# Patient Record
Sex: MALE | Race: WHITE | NOT HISPANIC OR LATINO | Employment: FULL TIME | URBAN - METROPOLITAN AREA
[De-identification: names, ages, dates, MRNs, and addresses within clinical notes are randomized per-mention and may not be internally consistent; named-entity substitution may affect disease eponyms.]

---

## 2017-01-10 ENCOUNTER — TELEPHONE (OUTPATIENT)
Dept: NEUROLOGY | Facility: HOSPITAL | Age: 32
End: 2017-01-10

## 2017-01-10 NOTE — TELEPHONE ENCOUNTER
Spoke with pt and informed him that EGD was approved but not the stretta procedure. Pt verbalizes understanding.

## 2017-01-10 NOTE — TELEPHONE ENCOUNTER
Spoke with pt and informed him that procedure that he is supposed to have tomorrow will not be taking place because his insurance will not cover the test. Informed pt that starting the process of appealing the decision and will contact pt to set up new date. Pt verbalizes understanding.

## 2017-05-02 ENCOUNTER — OFFICE VISIT (OUTPATIENT)
Dept: FAMILY MEDICINE | Facility: HOSPITAL | Age: 32
End: 2017-05-02
Attending: FAMILY MEDICINE
Payer: OTHER GOVERNMENT

## 2017-05-02 VITALS
DIASTOLIC BLOOD PRESSURE: 85 MMHG | BODY MASS INDEX: 32.5 KG/M2 | HEART RATE: 80 BPM | HEIGHT: 71 IN | WEIGHT: 232.13 LBS | SYSTOLIC BLOOD PRESSURE: 122 MMHG

## 2017-05-02 DIAGNOSIS — K21.9 CHRONIC GERD: ICD-10-CM

## 2017-05-02 DIAGNOSIS — L91.8 ACROCHORDON: Primary | ICD-10-CM

## 2017-05-02 PROCEDURE — 99214 OFFICE O/P EST MOD 30 MIN: CPT

## 2017-05-02 NOTE — PROGRESS NOTES
"Subjective:       Patient ID: Daniel Behlar is a 32 y.o. male.    Chief Complaint: referral    HPI Comments: 32 year old male history of GERD comes in for blurry vision.  Patient reports that he has been having a skin tag on his right eye for "years" that has caused some visual disturbance. Reports that patient has been busy and unable to care for it.  Reports that the tag would sometime cause his eye to itch.  Denies erythema, fluctuance, or drainage. Patient is concerned that it may grow and wants to have it removed.     Review of Systems   Constitutional: Negative for chills, fatigue, fever and unexpected weight change.   Eyes: Positive for visual disturbance. Negative for photophobia, pain, discharge, redness and itching.   Respiratory: Negative for cough, chest tightness and shortness of breath.    Cardiovascular: Negative for chest pain.   Gastrointestinal: Negative for abdominal pain, constipation, diarrhea, nausea and vomiting.   Endocrine: Negative for polyuria.   Neurological: Negative for headaches.       Objective:      Vitals:    05/02/17 1533   BP: 122/85   Pulse: 80     Physical Exam   Constitutional: He is oriented to person, place, and time. He appears well-developed and well-nourished. No distress.   HENT:   Head: Normocephalic and atraumatic.   Right Ear: External ear normal.   Left Ear: External ear normal.   3x3 mm acrochordon on lower lateral eye lid   Eyes: Conjunctivae and EOM are normal. Right eye exhibits no discharge. Left eye exhibits no discharge. No scleral icterus.   Neck: Normal range of motion.   Cardiovascular: Normal rate, regular rhythm and normal heart sounds.  Exam reveals no gallop and no friction rub.    No murmur heard.  Pulmonary/Chest: Effort normal and breath sounds normal. No respiratory distress. He has no wheezes. He has no rales. He exhibits no tenderness.   Musculoskeletal: He exhibits no edema or tenderness.   Neurological: He is alert and oriented to person, place, " and time.   Skin: Skin is warm. He is not diaphoretic.   Psychiatric: He has a normal mood and affect. His behavior is normal. Judgment and thought content normal.   Nursing note and vitals reviewed.      Assessment:       1. Acrochordon    2. Chronic GERD        Plan:       Acrochordon  -     Ambulatory referral to Dermatology  -     Ambulatory referral to Ophthalmology    Chronic GERD    - Patient on chronic PPI. Notified patient of chronic PPI use causing renal injury.  Patient aware.    Return if symptoms worsen or fail to improve.

## 2017-05-04 NOTE — PROGRESS NOTES
I assume primary medical responsibility for this patient, I have reviewed the case history, findings, diagnosis and treatment plan with the resident and agree that the care is reasonable and necessary. This service has been performed by a resident without the presence of a teaching physician under the primary care exception  Natalie Rowland  5/4/2017

## 2017-06-26 ENCOUNTER — OFFICE VISIT (OUTPATIENT)
Dept: FAMILY MEDICINE | Facility: HOSPITAL | Age: 32
End: 2017-06-26
Attending: FAMILY MEDICINE
Payer: OTHER GOVERNMENT

## 2017-06-26 VITALS
HEIGHT: 71 IN | HEART RATE: 63 BPM | DIASTOLIC BLOOD PRESSURE: 80 MMHG | WEIGHT: 231.25 LBS | BODY MASS INDEX: 32.37 KG/M2 | SYSTOLIC BLOOD PRESSURE: 119 MMHG

## 2017-06-26 DIAGNOSIS — N45.1 EPIDIDYMITIS: ICD-10-CM

## 2017-06-26 DIAGNOSIS — N50.811 RIGHT TESTICULAR PAIN: Primary | ICD-10-CM

## 2017-06-26 PROCEDURE — 99213 OFFICE O/P EST LOW 20 MIN: CPT

## 2017-06-26 RX ORDER — NAPROXEN 500 MG/1
500 TABLET ORAL 2 TIMES DAILY WITH MEALS
Qty: 28 TABLET | Refills: 0 | Status: SHIPPED | OUTPATIENT
Start: 2017-06-26 | End: 2017-07-10

## 2017-06-26 NOTE — PROGRESS NOTES
"Subjective:       Patient ID: Daniel Behlar is a 32 y.o. male.    Chief Complaint: Testicle Pain    HPI   33 y/o no significant hx comes in for right testicular pain. Reports that 1 1/2 month ago, patient suddenly developed right testicular pain when he was in bed. Described it as an aching, pressure-like, squeezing pain, "gnawing", 5/10, non-radiating pain. Pain comes and goes randomly, with variation with duration. Sometimes, it occurs for a few seconds, and other times it occurs for 20 minutes.  Pain does not worsen with lifting heavy objects.  Denies fever or chills. Denies dysuria, hematuria, foul smelling urine. Denies abnormal semen. States that he recently became more sexually active with his wife, after she delivered. Patient reports that he is monogamous with his wife and his wife is monogamous also.      Review of Systems   Constitutional: Negative for chills, fatigue, fever and unexpected weight change.   HENT: Negative for sore throat.    Eyes: Negative for visual disturbance.   Respiratory: Negative for cough, chest tightness and shortness of breath.    Cardiovascular: Negative for chest pain.   Gastrointestinal: Negative for abdominal pain, constipation, diarrhea, nausea and vomiting.   Endocrine: Negative for polyuria.   Genitourinary: Negative for dysuria and hematuria.   Neurological: Negative for headaches.       Objective:      Vitals:    06/26/17 1032   BP: 119/80   Pulse: 63     Physical Exam   Constitutional: He is oriented to person, place, and time. He appears well-developed and well-nourished. No distress.   HENT:   Head: Normocephalic and atraumatic.   Right Ear: External ear normal.   Left Ear: External ear normal.   Eyes: Conjunctivae and EOM are normal. Right eye exhibits no discharge. Left eye exhibits no discharge. No scleral icterus.   Neck: Normal range of motion.   Cardiovascular: Normal rate, regular rhythm and normal heart sounds.  Exam reveals no gallop and no friction rub.  "   No murmur heard.  Pulmonary/Chest: Effort normal and breath sounds normal. No respiratory distress. He has no wheezes. He has no rales. He exhibits no tenderness.   Genitourinary: Penis normal. No penile tenderness.   Genitourinary Comments: Right lateral scrotal pain, sharp, non-radiating. No hernia noted. No abnormal mass felt on baring down or coughing. Testes non-tender, equal in size, equal firmness.   Musculoskeletal: He exhibits no edema or tenderness.   Neurological: He is alert and oriented to person, place, and time.   Skin: Skin is warm. He is not diaphoretic.   Psychiatric: He has a normal mood and affect. His behavior is normal. Judgment and thought content normal.   Nursing note and vitals reviewed.        POCT UA - benign     Assessment:       1. Right testicular pain    2. Epididymitis        Plan:       Right testicular pain  -     POCT URINALYSIS  -     Urinalysis; Future; Expected date: 06/26/2017  -     C. trachomatis/N. gonorrhoeae by AMP DNA Urine; Future; Expected date: 06/26/2017  -     RPR; Future; Expected date: 06/26/2017  -     US Scrotum And Testicles; Future; Expected date: 06/26/2017  -     Urine culture; Future; Expected date: 06/26/2017  -     naproxen (EC NAPROSYN) 500 MG EC tablet; Take 1 tablet (500 mg total) by mouth 2 (two) times daily with meals.  Dispense: 28 tablet; Refill: 0    Epididymitis  -     Urinalysis; Future; Expected date: 06/26/2017  -     C. trachomatis/N. gonorrhoeae by AMP DNA Urine; Future; Expected date: 06/26/2017  -     RPR; Future; Expected date: 06/26/2017  -     US Scrotum And Testicles; Future; Expected date: 06/26/2017  -     Urine culture; Future; Expected date: 06/26/2017  -     naproxen (EC NAPROSYN) 500 MG EC tablet; Take 1 tablet (500 mg total) by mouth 2 (two) times daily with meals.  Dispense: 28 tablet; Refill: 0    - Suspected non-infectious epididymitis. Will attempt conservative therapy.  If STI labs positive, will start Abx.    Return in  about 2 weeks (around 7/10/2017).

## 2017-06-28 ENCOUNTER — TELEPHONE (OUTPATIENT)
Dept: FAMILY MEDICINE | Facility: HOSPITAL | Age: 32
End: 2017-06-28

## 2017-11-13 ENCOUNTER — HOSPITAL ENCOUNTER (EMERGENCY)
Facility: HOSPITAL | Age: 32
Discharge: HOME | End: 2017-11-13
Attending: EMERGENCY MEDICINE
Payer: MEDICAID

## 2017-11-13 VITALS
HEIGHT: 71 IN | SYSTOLIC BLOOD PRESSURE: 132 MMHG | DIASTOLIC BLOOD PRESSURE: 78 MMHG | RESPIRATION RATE: 16 BRPM | WEIGHT: 200 LBS | TEMPERATURE: 98 F | OXYGEN SATURATION: 97 % | BODY MASS INDEX: 28 KG/M2 | HEART RATE: 74 BPM
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
## 2017-11-13 DIAGNOSIS — S62.343A NONDISPLACED FRACTURE OF BASE OF THIRD METACARPAL BONE, LEFT HAND, INITIAL ENCOUNTER FOR CLOSED FRACTURE: Primary | ICD-10-CM

## 2017-11-13 DIAGNOSIS — S62.345A NONDISPLACED FRACTURE OF BASE OF FOURTH METACARPAL BONE, LEFT HAND, INITIAL ENCOUNTER FOR CLOSED FRACTURE: ICD-10-CM

## 2017-11-13 PROCEDURE — 99283 EMERGENCY DEPT VISIT LOW MDM: CPT | Mod: 25

## 2017-11-13 PROCEDURE — 29125 APPL SHORT ARM SPLINT STATIC: CPT | Mod: LT

## 2017-11-13 PROCEDURE — 25000003 PHARM REV CODE 250: Performed by: NURSE PRACTITIONER

## 2017-11-13 RX ORDER — ACETAMINOPHEN 500 MG
500 TABLET ORAL
Status: COMPLETED | OUTPATIENT
Start: 2017-11-13 | End: 2017-11-13

## 2017-11-13 RX ORDER — HYDROCODONE BITARTRATE AND ACETAMINOPHEN 5; 325 MG/1; MG/1
1 TABLET ORAL EVERY 6 HOURS PRN
Qty: 10 TABLET | Refills: 0 | Status: SHIPPED | OUTPATIENT
Start: 2017-11-13 | End: 2017-11-21

## 2017-11-13 RX ADMIN — ACETAMINOPHEN 500 MG: 500 TABLET ORAL at 03:11

## 2017-11-13 NOTE — ED NOTES
Left hand pain and swelling after falling and landing on it while moving a bed frame on Saturday.  Pt says that he fell backwards and landed on hand in an awkward position.  Pt tried applying ice and naproxyn to hand, says that it has improved some but still concerned.  +swelling, + bruising to left hand, left 2nd, 3rd, 4th and 5th digits, and palm of left hand.  Cap refill < 2 sec, nailbeds pink.  Radial/ulnar pulses palpable and strong.  Ice pack applied

## 2017-11-13 NOTE — ED PROVIDER NOTES
"Encounter Date: 11/13/2017       History     Chief Complaint   Patient presents with    Hand Injury     pt reports injurying left hand on Saturday while moving a bedframe. swelling noted.      31yo male here for left hand pain after a fall on Saturday.  Pt states that he was moving a bed frame up stairs on Saturday when he fell backwards, landing on outstretched left hand.  He denies head injury and neck pain.  He reports that he heard "three pops" at the time of the injury and had immediate swelling.  He does report a history of a boxer's fracture in the same hand which he reports did not heal correctly as he removed the splint prior to being cleared.  He denies numbness and tingling.  Movement and use makes the pain worse.  Naprosyn helps moderately.        The history is provided by the patient.   Hand Injury    Illness onset: Saturday. The incident occurred at home. The injury mechanism was a fall. The pain is present in the left hand. The quality of the pain is described as aching. The pain is at a severity of 8/10. The pain has been constant since the incident. Pertinent negatives include no fever. He reports no foreign bodies present. The symptoms are aggravated by movement, palpation and use. He has tried NSAIDs and ice for the symptoms. The treatment provided moderate relief.     Review of patient's allergies indicates:  No Known Allergies  History reviewed. No pertinent past medical history.  Past Surgical History:   Procedure Laterality Date    ESOPHAGOGASTRODUODENOSCOPY       History reviewed. No pertinent family history.  Social History   Substance Use Topics    Smoking status: Current Every Day Smoker     Packs/day: 0.25     Types: Cigarettes    Smokeless tobacco: Never Used      Comment: quit smoking May 21, 2015, the day before first baby born    Alcohol use No      Comment: Weekly     Review of Systems   Constitutional: Negative for chills and fever.   HENT: Negative for congestion.  "   Respiratory: Negative for cough.    Gastrointestinal: Negative for abdominal pain and vomiting.   Musculoskeletal: Positive for arthralgias and joint swelling. Negative for gait problem and neck pain.   Skin: Negative for wound.   Neurological: Negative for weakness.   Psychiatric/Behavioral: Negative for confusion.       Physical Exam     Initial Vitals [11/13/17 1457]   BP Pulse Resp Temp SpO2   (!) 141/84 91 16 97.9 °F (36.6 °C) 99 %      MAP       103         Physical Exam    Nursing note and vitals reviewed.  Constitutional: Vital signs are normal. He appears well-developed and well-nourished. He is active and cooperative. He is easily aroused.  Non-toxic appearance. He does not have a sickly appearance. He does not appear ill. No distress.   HENT:   Head: Normocephalic and atraumatic.   Eyes: Conjunctivae are normal.   Neck: Normal range of motion.   Cardiovascular: Normal rate.   Pulses:       Radial pulses are 2+ on the right side, and 2+ on the left side.   Pulmonary/Chest: Effort normal.   Abdominal: Soft. Normal appearance and bowel sounds are normal. There is no tenderness.   Musculoskeletal:        Left elbow: Normal.        Left wrist: He exhibits tenderness. He exhibits normal range of motion, no bony tenderness, no swelling, no effusion, no crepitus, no deformity and no laceration.        Left forearm: Normal.        Left hand: He exhibits decreased range of motion (due to pain), tenderness, bony tenderness and swelling. He exhibits normal two-point discrimination, normal capillary refill, no deformity and no laceration. Normal sensation noted. Normal strength noted.        Hands:  No scaphoid tenderness.    Neurological: He is alert, oriented to person, place, and time and easily aroused. He has normal strength. No sensory deficit. GCS eye subscore is 4. GCS verbal subscore is 5. GCS motor subscore is 6.   Skin: Skin is warm, dry and intact. Capillary refill takes less than 2 seconds. Bruising and  ecchymosis noted. No abrasion and no rash noted. No erythema.   Psychiatric: He has a normal mood and affect. His speech is normal and behavior is normal. Judgment and thought content normal. Cognition and memory are normal.         ED Course   Splint Application  Date/Time: 11/13/2017 4:00 PM  Performed by: CARISSA GIPSON  Authorized by: SHARLENE CHOPRA   Consent Done: Yes  Consent: Verbal consent obtained. Written consent not obtained.  Risks and benefits: risks, benefits and alternatives were discussed  Consent given by: patient  Patient understanding: patient states understanding of the procedure being performed  Patient consent: the patient's understanding of the procedure matches consent given  Procedure consent: procedure consent matches procedure scheduled  Imaging studies: imaging studies available  Required items: required blood products, implants, devices, and special equipment available  Patient identity confirmed: verbally with patient  Location details: left hand  Splint type: volar short arm  Supplies used: cotton padding,  elastic bandage and Ortho-Glass  Post-procedure: The splinted body part was neurovascularly unchanged following the procedure.  Patient tolerance: Patient tolerated the procedure well with no immediate complications        Labs Reviewed - No data to display     Imaging Results          X-Ray Wrist Complete Left (Final result)  Result time 11/13/17 15:41:39    Final result by Bettina Reddy MD (11/13/17 15:41:39)                 Impression:        1. Acute fractures of the third and fourth metacarpals, as above.    2. Dorsal wrist artifact from overlapping osseous interface versus triquetral fracture. Correlate for point tenderness at this region.      Electronically signed by: BETTINA REDDY MD, MD  Date:     11/13/17  Time:    15:41              Narrative:    COMPARISON: None    FINDINGS: 3 views left wrist; 3 views left hand.        Bones are well mineralized.  Acute mildly displaced mild type fracture involving the proximal and mid diaphysis and also proximal metaphysis of the fourth metacarpal without significant angulation. Additional acute nondisplaced fracture involving the entire diaphysis of the third metacarpal without significant angulation.  There is overlying soft tissue swelling.    Small ossific body projected dorsal to the carpals without significant overlying soft tissue swelling which could represent osseous interface/bony projection versus triquetral fracture.    Chronic-appearing/healed fracture deformity of the mid and distal fifth metacarpal with slight apex angulation toward the dorsal and normal aspect.    No dislocation or destructive osseous process identified.  Mild degenerative change about the carpals and radiocarpal interval.   No subcutaneous emphysema or radiodense retained foreign body.                             X-Ray Hand 3 View Left (Final result)  Result time 11/13/17 15:41:39    Final result by Bettina Reddy MD (11/13/17 15:41:39)                 Impression:        1. Acute fractures of the third and fourth metacarpals, as above.    2. Dorsal wrist artifact from overlapping osseous interface versus triquetral fracture. Correlate for point tenderness at this region.      Electronically signed by: BETTINA REDDY MD, MD  Date:     11/13/17  Time:    15:41              Narrative:    COMPARISON: None    FINDINGS: 3 views left wrist; 3 views left hand.        Bones are well mineralized. Acute mildly displaced mild type fracture involving the proximal and mid diaphysis and also proximal metaphysis of the fourth metacarpal without significant angulation. Additional acute nondisplaced fracture involving the entire diaphysis of the third metacarpal without significant angulation.  There is overlying soft tissue swelling.    Small ossific body projected dorsal to the carpals without significant overlying soft tissue swelling which could represent  osseous interface/bony projection versus triquetral fracture.    Chronic-appearing/healed fracture deformity of the mid and distal fifth metacarpal with slight apex angulation toward the dorsal and normal aspect.    No dislocation or destructive osseous process identified.  Mild degenerative change about the carpals and radiocarpal interval.   No subcutaneous emphysema or radiodense retained foreign body.                                 Medical Decision Making:   Initial Assessment:   33yo male here for left hand pain after FOOSH on Saturday.  No head or neck injury.  No numbness/tingling.  TTP over left #2, 3, and 4 metacarpals with bruising and swelling.  Left wrist tenderness at carpal bones.  No scaphoid tenderness.  NVI intact distal to injury.    Differential Diagnosis:   Contusion, fracture, dislocation, sprain  Clinical Tests:   Radiological Study: Ordered and Reviewed  ED Management:  Xray left wrist and hand, PO tylenol  Xray read by radiologist and reviewed by me.  Fractures of third and fourth metacarpals with possible triquetral fracture.  Pt placed in splint, please see procedure note.  F/u with ortho within 3 days.  I reviewed splint care and narcotic medication precautions.  Pt verbalized understanding, compliance, and agreement with the treatment plan.  RX norco.               Attending Attestation:     Physician Attestation Statement for NP/PA:   I discussed this assessment and plan of this patient with the NP/PA, but I did not personally examine the patient. The face to face encounter was performed by the NP/PA.                  ED Course      Clinical Impression:   The primary encounter diagnosis was Nondisplaced fracture of base of third metacarpal bone, left hand, initial encounter for closed fracture. Diagnoses of Fall and Nondisplaced fracture of base of fourth metacarpal bone, left hand, initial encounter for closed fracture were also pertinent to this visit.                            Shonda Dinero, KIRK  11/13/17 160       Andrew Curtis MD  11/13/17 [id number]

## 2017-11-14 ENCOUNTER — TELEPHONE (OUTPATIENT)
Dept: ORTHOPEDICS | Facility: CLINIC | Age: 32
End: 2017-11-14

## 2017-11-14 NOTE — TELEPHONE ENCOUNTER
I spoke with the patient and informed him that we don't have any openings for his insurance until next year and he should not wait that long to be seen. He understood and stated he will find another doctor.

## 2017-11-21 ENCOUNTER — HOSPITAL ENCOUNTER (EMERGENCY)
Facility: HOSPITAL | Age: 32
Discharge: HOME | End: 2017-11-21
Attending: EMERGENCY MEDICINE
Payer: MEDICAID

## 2017-11-21 VITALS
TEMPERATURE: 99 F | HEIGHT: 71 IN | DIASTOLIC BLOOD PRESSURE: 84 MMHG | HEART RATE: 90 BPM | OXYGEN SATURATION: 98 % | SYSTOLIC BLOOD PRESSURE: 150 MMHG | WEIGHT: 200 LBS | BODY MASS INDEX: 28 KG/M2 | RESPIRATION RATE: 18 BRPM

## 2017-11-21 DIAGNOSIS — S62.355A CLOSED NONDISPLACED FRACTURE OF SHAFT OF FOURTH METACARPAL BONE OF LEFT HAND, INITIAL ENCOUNTER: ICD-10-CM

## 2017-11-21 DIAGNOSIS — S62.353D CLOSED NONDISPLACED FRACTURE OF SHAFT OF THIRD METACARPAL BONE OF LEFT HAND WITH ROUTINE HEALING, SUBSEQUENT ENCOUNTER: Primary | ICD-10-CM

## 2017-11-21 PROCEDURE — 99283 EMERGENCY DEPT VISIT LOW MDM: CPT

## 2017-11-21 RX ORDER — HYDROCODONE BITARTRATE AND ACETAMINOPHEN 5; 325 MG/1; MG/1
1 TABLET ORAL EVERY 6 HOURS PRN
Qty: 15 TABLET | Refills: 0 | Status: SHIPPED | OUTPATIENT
Start: 2017-11-21 | End: 2018-03-21

## 2017-11-21 NOTE — ED NOTES
APPEARANCE: Alert, oriented and in no acute distress.  CARDIAC: Normal rate and rhythm, no murmur heard.   PERIPHERAL VASCULAR: peripheral pulses present. Normal cap refill. No edema. Warm to touch.    RESPIRATORY:Normal rate and effort, breath sounds clear bilaterally throughout chest. Respirations are equal and unlabored no obvious signs of distress.  GASTRO: soft, bowel sounds normal, no tenderness, no abdominal distention.  MUSC: Left hand pain x 1.5 weeks after sustaining fracture while helping move furniture. +bruising, pain and swelling, unable to get ortho appt until Dec 4th  SKIN: Skin is warm and dry, normal skin turgor, mucous membranes moist.  NEURO: 5/5 strength major flexors/extensors bilaterally. Sensory intact to light touch bilaterally. Missy coma scale: eyes open spontaneously-4, oriented & converses-5, obeys commands-6. No neurological abnormalities.   MENTAL STATUS: awake, alert and aware of environment.

## 2017-11-21 NOTE — ED PROVIDER NOTES
Encounter Date: 11/21/2017    SCRIBE #1 NOTE: I, Taya Culp, am scribing for, and in the presence of,  MONY Kim I have scribed the entire note.       History     Chief Complaint   Patient presents with    Hand Pain     left hand fracture 1.5 weeks ago, has temp splint on, has appt with ortho on 12/4 but has been having significant swelling and pain with tingling     Time seen by provider: 4:33 PM    This is a 32 y.o. male who presents with complaint of left hand pain. He reports onset of symptoms was about 7 days ago. The patient states prior to onset of symptoms he fractured his hand. As per note the patient fractured the base of 3rd metacarpal of the left hand. The patient describes the pain as throbbing. He notes associated tingling but denies any weakness, open wounds, redness, fever or chills. The patient notes the pain worsens with movement and mildly improves with rest. He has history of previous fracture to the left hand.       The history is provided by the patient.     Review of patient's allergies indicates:  No Known Allergies  History reviewed. No pertinent past medical history.  Past Surgical History:   Procedure Laterality Date    ESOPHAGOGASTRODUODENOSCOPY       History reviewed. No pertinent family history.  Social History   Substance Use Topics    Smoking status: Current Every Day Smoker     Packs/day: 0.25     Types: Cigarettes    Smokeless tobacco: Never Used      Comment: quit smoking May 21, 2015, the day before first baby born    Alcohol use No      Comment: Weekly     Review of Systems   Constitutional: Negative for chills and fever.   HENT: Negative for congestion and sore throat.    Eyes: Negative for redness and visual disturbance.   Respiratory: Negative for cough and shortness of breath.    Cardiovascular: Negative for chest pain and palpitations.   Gastrointestinal: Negative for abdominal pain, diarrhea, nausea and vomiting.   Genitourinary: Negative for dysuria.    Musculoskeletal: Negative for back pain.        Left hand pain   Skin: Negative for rash.   Neurological: Negative for weakness and headaches.   Psychiatric/Behavioral: Negative for confusion.       Physical Exam     Initial Vitals [11/21/17 1620]   BP Pulse Resp Temp SpO2   (!) 150/84 90 18 98.6 °F (37 °C) 98 %      MAP       106         Physical Exam    Nursing note and vitals reviewed.  Constitutional: He appears well-developed and well-nourished. He is not diaphoretic. No distress.   HENT:   Head: Normocephalic and atraumatic.   Nose: Nose normal.   Mouth/Throat: Oropharynx is clear and moist.   Eyes: Conjunctivae and EOM are normal. Pupils are equal, round, and reactive to light.   Neck: Normal range of motion. Neck supple.   Cardiovascular: Intact distal pulses.   Pulmonary/Chest: No respiratory distress.   Musculoskeletal: Normal range of motion. He exhibits tenderness. He exhibits no edema.   Bruising and ecchymosis of left hand extending to 2nd, 3rd and 5th metacarpal. Tenderness to 3rd metacarpal. No deformity noted   Lymphadenopathy:     He has no cervical adenopathy.   Neurological: He is alert and oriented to person, place, and time. He has normal strength.   Skin: Skin is warm and dry. No rash noted.         ED Course   Procedures  Labs Reviewed - No data to display     Imaging Results          X-Ray Hand 3 view Left (Final result)  Result time 11/21/17 17:04:05    Final result by Hannah Diaz MD (11/21/17 17:04:05)                 Impression:      1.  Grossly stable subacute third and fourth metacarpal fractures, no convincing new fracture.  Please see above.      Electronically signed by: HANNAH DIAZ MD  Date:     11/21/17  Time:    17:04              Narrative:    Hand complete 3 view left    Clinical history: Left hand pain    Comparison: 11/13/2017    Findings:  3 views.    Subacute fractures again noted of the third and fourth metacarpals, with remote injury involving the fifth  metacarpal.  Overall angulation and displacement of the subacute fractures is grossly stable as compared to the previous exam.  There is continued edema in the region.  No convincing new fracture.                            X-Rays:   Independently Interpreted Readings:   Other Readings:  Left Hand X-ray Reading (5:09 PM): No change from previous    Medical Decision Making:   Independently Interpreted Test(s):   I have ordered and independently interpreted X-rays - see prior notes.  Clinical Tests:   Radiological Study: Ordered and Reviewed    Additional MDM:   X-Rays: I have independently interpreted X-Ray(s) - see notes.     I, Dr. Shekhar Clifford, personally performed the services described in this documentation.   All medical record entries made by the scribe were at my direction and in my presence.   I have reviewed the chart and agree that the record is accurate and complete.   Shekhar Clifford MD.  5:10 PM 11/25/2017          Attending Attestation:             Attending ED Notes:   Patient's splint was removed, x-rays performed which showed no significant change.  Splint was replaced by nurse, BEKAH after placement.    Overall impression is nondisplaced fracture to the third and fourth metacarpals of the left hand.   Follow-up with Ortho as referred or return for any concerns.          ED Course      Clinical Impression:     1. Closed nondisplaced fracture of shaft of third metacarpal bone of left hand with routine healing, subsequent encounter    2. Closed nondisplaced fracture of shaft of fourth metacarpal bone of left hand, initial encounter                                 Shekhar Clifford MD  11/25/17 [id number]

## 2018-03-21 ENCOUNTER — OFFICE VISIT (OUTPATIENT)
Dept: FAMILY MEDICINE | Facility: HOSPITAL | Age: 33
End: 2018-03-21
Attending: FAMILY MEDICINE
Payer: MEDICAID

## 2018-03-21 VITALS
HEIGHT: 71 IN | WEIGHT: 202.38 LBS | HEART RATE: 125 BPM | BODY MASS INDEX: 28.33 KG/M2 | SYSTOLIC BLOOD PRESSURE: 145 MMHG | DIASTOLIC BLOOD PRESSURE: 80 MMHG

## 2018-03-21 DIAGNOSIS — M54.31 SCIATICA OF RIGHT SIDE: Primary | ICD-10-CM

## 2018-03-21 DIAGNOSIS — Z00.00 HEALTHCARE MAINTENANCE: ICD-10-CM

## 2018-03-21 DIAGNOSIS — R03.0 ELEVATED BLOOD PRESSURE READING: ICD-10-CM

## 2018-03-21 PROCEDURE — 99214 OFFICE O/P EST MOD 30 MIN: CPT

## 2018-03-21 RX ORDER — MELOXICAM 7.5 MG/1
7.5 TABLET ORAL DAILY
Qty: 30 TABLET | Refills: 0 | Status: SHIPPED | OUTPATIENT
Start: 2018-03-21 | End: 2018-04-20

## 2018-03-21 RX ORDER — GABAPENTIN 100 MG/1
100 CAPSULE ORAL 3 TIMES DAILY
Qty: 90 CAPSULE | Refills: 2 | Status: SHIPPED | OUTPATIENT
Start: 2018-03-21 | End: 2019-01-05

## 2018-03-21 NOTE — PROGRESS NOTES
Subjective:       Patient ID: Daniel Behlar is a 33 y.o. male.    Chief Complaint: Sciatica    HPI   34 yo M comes in for right sciatica pain. Reports that pain started 6 months ago and has been persistent. Has attempted to use naproxen and stretching with minimal relief. Reports that has buttocks pain on his right that radiates down his leg down to his toes. Unable to sit for long periods of time without having pain. Unable to focus and talk to other at times due to the pain. Able to ambulate but with shooting pain.    Review of Systems   Constitutional: Negative for chills, fatigue, fever and unexpected weight change.   HENT: Negative for sore throat.    Eyes: Negative for visual disturbance.   Respiratory: Negative for cough, chest tightness and shortness of breath.    Cardiovascular: Negative for chest pain.   Gastrointestinal: Negative for abdominal pain, constipation, diarrhea, nausea and vomiting.   Endocrine: Negative for polyuria.   Genitourinary: Negative for dysuria and hematuria.   Neurological: Negative for headaches.       Objective:      Vitals:    03/21/18 1345   BP: (!) 145/80   Pulse: (!) 125     Physical Exam   Constitutional: He is oriented to person, place, and time. He appears well-developed and well-nourished. No distress.   HENT:   Head: Normocephalic and atraumatic.   Right Ear: External ear normal.   Left Ear: External ear normal.   Eyes: Conjunctivae and EOM are normal. Right eye exhibits no discharge. Left eye exhibits no discharge. No scleral icterus.   Neck: Normal range of motion.   Cardiovascular: Normal rate, regular rhythm and normal heart sounds.  Exam reveals no gallop and no friction rub.    No murmur heard.  Pulmonary/Chest: Effort normal and breath sounds normal. No respiratory distress. He has no wheezes. He has no rales. He exhibits no tenderness.   Musculoskeletal: He exhibits no edema or tenderness.   Pain on right upper gluteal area with palpation   Neurological: He is  alert and oriented to person, place, and time.   Skin: Skin is warm. He is not diaphoretic.   Psychiatric: He has a normal mood and affect. His behavior is normal. Judgment and thought content normal.   Nursing note and vitals reviewed.      Assessment:       1. Sciatica of right side    2. Healthcare maintenance    3. Elevated blood pressure reading        Plan:       Sciatica of right side  -     meloxicam (MOBIC) 7.5 MG tablet; Take 1 tablet (7.5 mg total) by mouth once daily.  Dispense: 30 tablet; Refill: 0  -     gabapentin (NEURONTIN) 100 MG capsule; Take 1 capsule (100 mg total) by mouth 3 (three) times daily.  Dispense: 90 capsule; Refill: 2  -     Ambulatory Referral to Physical/Occupational Therapy    Healthcare maintenance  -     RPR; Future; Expected date: 03/21/2018  -     Urinalysis; Future; Expected date: 03/21/2018  -     CBC auto differential; Future; Expected date: 03/21/2018  -     Comprehensive metabolic panel; Future; Expected date: 03/21/2018  -     Lipid panel; Future; Expected date: 03/21/2018    Elevated blood pressure reading    - Elevated BP likely 2/2 pain  - has 1 year old son, 2 yr old daughter  - reports HIV negative as outpatient 1 year ago    Follow-up if symptoms worsen or fail to improve.

## 2018-03-22 NOTE — PROGRESS NOTES
I assume primary medical responsibility for this patient, I have reviewed the case history, findings, diagnosis and treatment plan with the resident and agree that the care is reasonable and necessary. This service has been performed by a resident without the presence of a teaching physician under the primary care exception  Natalie Rowland  3/22/2018

## 2018-07-11 ENCOUNTER — HOSPITAL ENCOUNTER (OUTPATIENT)
Dept: RADIOLOGY | Facility: HOSPITAL | Age: 33
Discharge: HOME | End: 2018-07-11
Attending: FAMILY MEDICINE
Payer: MEDICAID

## 2018-07-11 ENCOUNTER — OFFICE VISIT (OUTPATIENT)
Dept: FAMILY MEDICINE | Facility: HOSPITAL | Age: 33
End: 2018-07-11
Payer: MEDICAID

## 2018-07-11 VITALS
WEIGHT: 189.13 LBS | HEIGHT: 71 IN | BODY MASS INDEX: 26.48 KG/M2 | HEART RATE: 83 BPM | SYSTOLIC BLOOD PRESSURE: 123 MMHG | DIASTOLIC BLOOD PRESSURE: 78 MMHG

## 2018-07-11 DIAGNOSIS — M54.30 SCIATICA, UNSPECIFIED LATERALITY: Primary | ICD-10-CM

## 2018-07-11 DIAGNOSIS — M54.30 SCIATICA, UNSPECIFIED LATERALITY: ICD-10-CM

## 2018-07-11 PROCEDURE — 72114 X-RAY EXAM L-S SPINE BENDING: CPT | Mod: TC,FY

## 2018-07-11 PROCEDURE — 72114 X-RAY EXAM L-S SPINE BENDING: CPT | Mod: 26,,, | Performed by: RADIOLOGY

## 2018-07-11 PROCEDURE — 99214 OFFICE O/P EST MOD 30 MIN: CPT | Mod: 25 | Performed by: FAMILY MEDICINE

## 2018-07-11 RX ORDER — GABAPENTIN 100 MG/1
200 CAPSULE ORAL 3 TIMES DAILY
Qty: 180 CAPSULE | Refills: 11 | Status: SHIPPED | OUTPATIENT
Start: 2018-07-11 | End: 2019-01-05

## 2018-07-11 NOTE — PROGRESS NOTES
Subjective:       Patient ID: Daniel Behlar is a 33 y.o. male.    Chief Complaint: R sided sciatic nerve pain    HPI   Mr. Behlar is a 34 yo white male who presents to the clinic complaining of 8/10 right sided back pain that radiates to his foot onset a few months ago.  He describes his pain as a stabbing, burning pain that radiates down his leg and causes his leg from the knee down to tingle and go numb when he sits. Pain is relieved with walking and standing and is increased when seated. He was previously seen in our clinic for the same complaint for which he was prescribed mobic, gabapentin, and referred to physcial therapy.  He states he took the mobic 1-2x a day with little relief of pain and he is now out of this medication.  He takes 100mg of gabapentin three times a day and states at first he felt it helped to relieve his pain, but now it does not do much to help. He was not evaluated by PT.  Otherwise, he is healthy with no other complaints.      Review of Systems   Constitutional: Negative for activity change, appetite change, fatigue and fever.   HENT: Negative for congestion, ear discharge and rhinorrhea.    Eyes: Negative for pain, discharge and itching.   Respiratory: Negative for cough, chest tightness and shortness of breath.    Cardiovascular: Negative for chest pain, palpitations and leg swelling.   Gastrointestinal: Negative for abdominal pain, constipation and diarrhea.   Genitourinary: Negative for difficulty urinating, frequency and urgency.   Musculoskeletal: Positive for back pain. Negative for arthralgias, gait problem and joint swelling.   Skin: Negative for color change, pallor and rash.   Neurological: Positive for numbness (RLE when seated). Negative for dizziness, syncope, weakness and headaches.   Psychiatric/Behavioral: Negative for agitation, decreased concentration and suicidal ideas.       Objective:      Vitals:    07/11/18 1123   BP: 123/78   Pulse: 83     Physical Exam    Constitutional: He is oriented to person, place, and time. He appears well-developed and well-nourished. No distress.   HENT:   Head: Normocephalic and atraumatic.   Nose: Nose normal.   Mouth/Throat: No oropharyngeal exudate.   Eyes: Conjunctivae are normal. Right eye exhibits no discharge. Left eye exhibits no discharge.   Neck: Normal range of motion. Neck supple. No JVD present. No tracheal deviation present.   Cardiovascular: Normal rate, regular rhythm, normal heart sounds and intact distal pulses.  Exam reveals no gallop and no friction rub.    No murmur heard.  Pulmonary/Chest: Effort normal and breath sounds normal. No stridor. No respiratory distress. He has no wheezes. He has no rales. He exhibits no tenderness.   Abdominal: Soft. Bowel sounds are normal. He exhibits no distension and no mass. There is no tenderness. There is no guarding.   Musculoskeletal: Normal range of motion. He exhibits no edema or deformity.   ROM of the RLE intact. Mild tenderness to deep palpation of the right buttock.   Neurological: He is alert and oriented to person, place, and time.   Skin: Skin is warm and dry. No rash noted. He is not diaphoretic. No erythema. No pallor.       Assessment:       1. Sciatica, right sided laterality        Plan:       Sciatica, right sided  -     gabapentin (NEURONTIN) 100 MG capsule; Take 2 capsules (200 mg total) by mouth 3 (three) times daily.  Dispense: 180 capsule; Refill: 11  -     Ambulatory consult to Physical Therapy  -     X-Ray Lumbar Complete With Flex And Ext; Future; Expected date: 07/11/2018    -Patient counseled on the importance of evaluation by physical therapy and compliance with that treatment. Dose of gabapentin was increased in hopes of providing some nerve pain relief. Imaging of the lower spine ordered to rule out any bony deformities.    Follow-up in about 6 weeks (around 8/22/2018).        7/11/2018 12:41 PM Mark Napoles M.D.

## 2018-07-11 NOTE — PROGRESS NOTES
I have reviewed the notes, assessments, and/or procedures performed, I concur with her/his documentation of Daniel Behlar.

## 2018-07-25 ENCOUNTER — CLINICAL SUPPORT (OUTPATIENT)
Dept: REHABILITATION | Facility: OTHER | Age: 33
End: 2018-07-25
Payer: MEDICAID

## 2018-07-25 DIAGNOSIS — G89.29 CHRONIC BILATERAL LOW BACK PAIN WITH RIGHT-SIDED SCIATICA: ICD-10-CM

## 2018-07-25 DIAGNOSIS — M54.41 CHRONIC BILATERAL LOW BACK PAIN WITH RIGHT-SIDED SCIATICA: ICD-10-CM

## 2018-07-25 PROCEDURE — 97161 PT EVAL LOW COMPLEX 20 MIN: CPT | Mod: PN | Performed by: PHYSICAL THERAPIST

## 2018-07-25 NOTE — PLAN OF CARE
TIME RECORD    Date: 07/25/2018    Start Time:  1200  Stop Time:  1307      OUTPATIENT PHYSICAL THERAPY   PATIENT EVALUATION  Primary Diagnosis:   1. Chronic bilateral low back pain with right-sided sciatica       Treatment Diagnosis: decreased ROM, muscle weakness gluteals, decreased soft tissue mobility, decreased motor control lumbopelvic   No past medical history on file.  Precautions: standard  Prior Therapy: no PT  Medications: Daniel Behlar has a current medication list which includes the following prescription(s): gabapentin, gabapentin, and omeprazole.  Nutrition:  Normal, recent intentional weight loss  History of Present Illness:Mild levoscoliosis of the upper lumbar spine.  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 He has always had some low back pain but it has been progressively worsening and interfering with his family and work activities. 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 Pain is mostly when sitting and must lean to the side. He is standing during hx portion of eval. Pain is currently on right side of lower back (QL region). Pain will often radiate down the buttocks and side of right thigh. Occasional pain down past his knee. No numbness, tingling, B/B changes. Pt has not had relief with acupuncture or chiropractor care. He saw his chiropractor this morning. He has been doing stretches he found online and will get temporary relief and TENS unit at home. He has not tried formal PT.     Pain:  Location: back  and buttocks   Description: Burninglower has   Activities Which Increase Pain: Sitting and Bending  Activities Which Decrease Pain: TENS unit, stretches, standing  Pain  "Scale: 2/10 at best 6/10 now  10/10 at worst    Objective     Range of Motion/Strength:      Lumbar Spine Active ROM, measured in degrees with inclinometers at T12/L1 and S2, * Indicates pain with movement    Flexion: 90/60: 40*  Extension:40/20: 20*  Left Side Bend:20  Right Side Bend:20    Thoracolumbar rotation: 75% and painful R  Joint mobility: 2/6 mid thoracic, painful and guarded to PA pressure T12-L5    MMT    Left  Right    Hip:  Flexion    4+/5  4/5  Extension   4+/5  4/5  Abduction   4/5  4-/5  Adduction   4/5  4+/5  External Rotation  5/5  4+/5  Internal rotation  5/5  4/5    Knee:  Flexion    5/5  5/5   Extension   5/5  5/5    Ankle:  Dorsiflexion   5/5  5/5  Plantar flexion   5/5  5/5    Flexibility: Hamstring flexibility good (increased neural tension R)  Moderate tightness R rectus femoris  Gait: Without AD  Analysis: Assistance indepedent  Bed Mobility:Independent  Transfers: Independent  Special Tests:   SLR: negative  Slump: positive R  RANJIT:negative  Supine to sit: R posterior (short to long)  Modified Rupesh: positive R  Rupesh: neg  Prone instability: neg    Treatment: PT Evaluation Completed? Yes  Discussed Plan of Care with patient: Yes    Patient received 15 minutes of therapeutic exercise & instruction including:  Prone press up x 10  Transverse abdominus and instruction   Piriformis stretch 15" x 4  Standing L side glides x 10    Application of FDN: Pt educated on benefits and potential side effects of dry needling. Educated pt on benefits, precautions, side effects followign IDN. Educated pt to use heat following treatment sessions if pt is experiencing pain or soreness. Pt verbalized good understanding of education.  Pt signed written consent to dry needling Rx. Pt gave verbal consent for DN   Pre Post   Multi-segmental flexion DP DN (improved finger to floor)   Multi-segmental extension DP DN   Multi-segmental rotation DP DN     Pt received dry needling to the below listed muscles " using 50-75mm needles to low back and RLE x 10 min No charge  Multifidus L3-5 B  Quadratus lumborum B  Gluteus medius/ minimus R  Vastus lateralis R    Written Home Exercises Provided: issued HEP with above exercises   Patient demo good understanding of the education provided. Patient demo good return demo of skill of exercises.      Assessment       Initial Assessment: Morro is a 33 year old male referred to physical therapy for diagnosis of low back pain with sciatica. Patient has the following impairments upon initial evaluation: decreased lumbar spine ROM, decreased soft tissue mobility and flexibility, R hip weakness, decreased motor control and core stability. Pt with mild levoscoliosis and R posteriorly rotated innominate. Pt with positive response to repeated L side glides and extension. Pt tolerated treatment performed today and FDN well without adverse effects noted. Patient to benefit from skilled physical therapy to address above deficits and maximize functional independence. Patient appears motivated to improve condition and is a good candidate for physical therapy. Patient has verbalized understanding of plan of care and set goals. Patient has no identified cultural, spiritual, or educational needs that would impair learning.      History  Co-morbidities and personal factors that may impact the plan of care Examination  Body Structures and Functions, activity limitations and participation restrictions that may impact the plan of care Clinical Presentation   Decision Making/ Complexity Score   Co-morbidities:       None  .            Personal Factors:    Body Regions:back, LEs    Body Systems:     Musculoskeletal:  weakness, impaired functional mobility, impaired balance, decreased lower extremity function, pain, decreased ROM and impaired muscle length    Neuromuscular:    Activity limitations: bending, sitting      Participation Restrictions: vocational         evolving    low     CMS  Impairment/Limitation/Restriction for FOTO Lumbar Spine Survey  Status Limitation G-Code CMS Severity Modifier  Intake 49% 51% Current Status CK - At least 40 percent but less than 60 percent  Predicted 62% 38% Goal Status+ CJ - At least 20 percent but less than 40 percent    Rehab Potiential: good    Short Term GOALS: 6 weeks  1. Patient to be independent with home exercise program for improved self management of condition  2. Patient demonstrates independence with Postural Awareness.   3. Patient demonstrates independence with body mechanics.   4. Patient to report decreased back and RLE pain by 40% or greater with ADL's    Long Term GOALS: 12 weeks  1. Patient demonstrates increased lumbar spine AROM to WNL to improve tolerance to functional activities pain free.   2. Patient demonstrates increased strength BLE's to 5/5 or greater to improve tolerance to functional activities pain free.   3. Patient to have decreased subjective report of disability as noted by a score of 38% or less on the FOTO questionnaire       Plan     Certification Period: 7/26/2018 to 10/26/2018  Recommended Treatment Plan: 1-2 times per week for 10-12 weeks: Cervical/Lumbar Traction, Manual Therapy, Moist Heat/ Ice, Neuromuscular Re-ed, Patient Education, Therapeutic Activites, Therapeutic Exercise and modalities and functional dry needling PRN  Other Recommendations: Patient may be seen by PTA as part of treatment team        Therapist: Ashley Cueto, PT

## 2018-07-26 PROBLEM — G89.29 CHRONIC BILATERAL LOW BACK PAIN WITH RIGHT-SIDED SCIATICA: Status: ACTIVE | Noted: 2018-07-26

## 2018-07-26 PROBLEM — M54.41 CHRONIC BILATERAL LOW BACK PAIN WITH RIGHT-SIDED SCIATICA: Status: ACTIVE | Noted: 2018-07-26

## 2018-12-31 ENCOUNTER — OCCUPATIONAL HEALTH (OUTPATIENT)
Dept: URGENT CARE | Facility: CLINIC | Age: 33
End: 2018-12-31

## 2018-12-31 DIAGNOSIS — Z00.00 ENCOUNTER FOR PHYSICAL EXAMINATION: ICD-10-CM

## 2018-12-31 PROCEDURE — 80305 DRUG TEST PRSMV DIR OPT OBS: CPT | Mod: S$GLB,,, | Performed by: PHYSICIAN ASSISTANT

## 2018-12-31 PROCEDURE — 92552 PURE TONE AUDIOMETRY AIR: CPT | Mod: S$GLB,,, | Performed by: PHYSICIAN ASSISTANT

## 2018-12-31 PROCEDURE — 99172 OCULAR FUNCTION SCREEN: CPT | Mod: S$GLB,,, | Performed by: PHYSICIAN ASSISTANT

## 2018-12-31 PROCEDURE — 99499 UNLISTED E&M SERVICE: CPT | Mod: S$GLB,,, | Performed by: PHYSICIAN ASSISTANT

## 2019-01-05 ENCOUNTER — HOSPITAL ENCOUNTER (EMERGENCY)
Facility: HOSPITAL | Age: 34
Discharge: HOME | End: 2019-01-05
Attending: EMERGENCY MEDICINE
Payer: MEDICAID

## 2019-01-05 VITALS
HEART RATE: 87 BPM | OXYGEN SATURATION: 99 % | SYSTOLIC BLOOD PRESSURE: 116 MMHG | BODY MASS INDEX: 27.72 KG/M2 | DIASTOLIC BLOOD PRESSURE: 61 MMHG | TEMPERATURE: 99 F | RESPIRATION RATE: 18 BRPM | HEIGHT: 71 IN | WEIGHT: 198 LBS

## 2019-01-05 DIAGNOSIS — J02.0 STREP PHARYNGITIS: Primary | ICD-10-CM

## 2019-01-05 LAB
DEPRECATED S PYO AG THROAT QL EIA: POSITIVE
FLUAV AG SPEC QL IA: NEGATIVE
FLUBV AG SPEC QL IA: NEGATIVE
SPECIMEN SOURCE: NORMAL

## 2019-01-05 PROCEDURE — 99284 EMERGENCY DEPT VISIT MOD MDM: CPT

## 2019-01-05 PROCEDURE — 87400 INFLUENZA A/B EACH AG IA: CPT

## 2019-01-05 PROCEDURE — 96372 THER/PROPH/DIAG INJ SC/IM: CPT | Mod: 59

## 2019-01-05 PROCEDURE — 63600175 PHARM REV CODE 636 W HCPCS: Mod: JG | Performed by: NURSE PRACTITIONER

## 2019-01-05 PROCEDURE — 87880 STREP A ASSAY W/OPTIC: CPT

## 2019-01-05 PROCEDURE — 25000003 PHARM REV CODE 250: Performed by: NURSE PRACTITIONER

## 2019-01-05 RX ORDER — IBUPROFEN 600 MG/1
600 TABLET ORAL
Status: COMPLETED | OUTPATIENT
Start: 2019-01-05 | End: 2019-01-05

## 2019-01-05 RX ORDER — DEXAMETHASONE SODIUM PHOSPHATE 4 MG/ML
8 INJECTION, SOLUTION INTRA-ARTICULAR; INTRALESIONAL; INTRAMUSCULAR; INTRAVENOUS; SOFT TISSUE
Status: COMPLETED | OUTPATIENT
Start: 2019-01-05 | End: 2019-01-05

## 2019-01-05 RX ADMIN — DEXAMETHASONE SODIUM PHOSPHATE 8 MG: 4 INJECTION, SOLUTION INTRAMUSCULAR; INTRAVENOUS at 01:01

## 2019-01-05 RX ADMIN — PENICILLIN G BENZATHINE 1.2 MILLION UNITS: 1200000 INJECTION, SUSPENSION INTRAMUSCULAR at 01:01

## 2019-01-05 RX ADMIN — IBUPROFEN 600 MG: 600 TABLET, FILM COATED ORAL at 01:01

## 2019-01-05 NOTE — DISCHARGE INSTRUCTIONS
Throw away toothbrush within 24 hr.  Increase oral and taking get plenty of rest.  Follow up with PCP in 1-2 days return to ED for any concerns or worsening symptoms, such as increased fever, drooling, difficulty breathing, or nonstop vomiting.

## 2019-01-05 NOTE — ED TRIAGE NOTES
Pt presents to ED and reports 8/10 sore throat with difficulty swallowing and fever with onset yesterday. Pt states he has taken Tylenol without relief. Objective: tonsil swelling, redness and white patches.

## 2019-01-05 NOTE — ED PROVIDER NOTES
Encounter Date: 2019       History     Chief Complaint   Patient presents with    Sore Throat     since yesterday     Patient 33-year-old male who denies past medical history who presents ED for evaluation of sore throat since yesterday.  Denies injury or trauma.  Patient reports history of strep throat.  Associates fever, chills, body aches, and difficulty swallowing.  Denies difficulty tolerating secretions.  Patient reports that he took Tylenol at approximately noon today.  Denies any alleviating factors.  Denies neck pain/stiffness, cough/congestion, sinus pain/pressure, ear pain, postnasal drip, nausea, vomiting, diarrhea, and any other concerns.      The history is provided by the patient.     Review of patient's allergies indicates:  No Known Allergies  History reviewed. No pertinent past medical history.  Past Surgical History:   Procedure Laterality Date    ESOPHAGOGASTRODUODENOSCOPY      EXCISION-MUCOCELE Left 4/15/2015    Performed by Khurram Magaña MD at [facility] OR [address] [place] [address]     History reviewed. No pertinent family history.  Social History     Tobacco Use    Smoking status: Former Smoker     Packs/day: 0.25     Types: Cigarettes     Last attempt to quit: 2018     Years since quittin.6    Smokeless tobacco: Never Used    Tobacco comment: quit smoking May 21, 2015, the day before first baby born   Substance Use Topics    Alcohol use: No    Drug use: No     Review of Systems   Constitutional: Positive for chills and fever.   HENT: Positive for sore throat and trouble swallowing. Negative for congestion, ear discharge, ear pain, postnasal drip, rhinorrhea, sinus pressure, sinus pain and voice change.    Respiratory: Negative for cough.    Gastrointestinal: Negative for diarrhea, nausea and vomiting.   Musculoskeletal: Positive for myalgias. Negative for neck pain and neck stiffness.   Skin: Negative for rash.   All other systems reviewed and are negative.      Physical Exam     Initial  Vitals [01/05/19 1301]   BP Pulse Resp Temp SpO2   131/77 110 18 (!) 100.7 °F (38.2 °C) 99 %      MAP       --         Physical Exam    Vitals reviewed.  Constitutional: He appears well-developed and well-nourished. He is not diaphoretic. He is active and cooperative.  Non-toxic appearance. He does not have a sickly appearance.   HENT:   Head: Atraumatic.   Nose: Nose normal.   MM moist.  Oropharynx with moderate erythema, edema, and exudate.  Uvula midline.  No Ilan's or trismus.  No oral lesions.  Tolerating secretions.   Eyes: EOM are normal.   Neck: Trachea normal, normal range of motion, full passive range of motion without pain and phonation normal. Neck supple.   Cardiovascular: Regular rhythm and normal pulses. Tachycardia present.    Pulmonary/Chest: Effort normal and breath sounds normal. No respiratory distress.   Neurological: He is alert and oriented to person, place, and time.   Skin: Skin is warm, dry and intact. Capillary refill takes less than 2 seconds.   Psychiatric: He has a normal mood and affect.         ED Course   Procedures  Labs Reviewed   THROAT SCREEN, RAPID - Abnormal; Notable for the following components:       Result Value    Rapid Strep A Screen Positive (*)     All other components within normal limits   INFLUENZA A AND B ANTIGEN          Imaging Results    None          Medical Decision Making:   History:   Old Medical Records: I decided to obtain old medical records.  Initial Assessment:   Patient presents ED for evaluation of sore throat since yesterday.  Febrile in ED but appears well and nontoxic. VSS. MM moist.  Oropharynx with moderate erythema, edema, and exudate.  Uvula midline.  No Ilan's or trismus.  No oral lesions. Tolerating secretions.  No respiratory distress.  Clinical Tests:   Radiological Study: Reviewed and Ordered  ED Management:  Strep swab, flu swab, IM decadron, PO ibuprofen, IM bicillin   Other:   I discussed test(s) with the performing physician.        <> Summary of the Findings: Care this patient discussed with Dr. Gann who agrees with this patient's ED course and disposition.   Flu negative.  Strep positive.  Patient treated in ED with IM Bicillin.  Patient is hemodynamically stable, afebrile, HR 87, and will be DC home.  Patient instructed to throw away toothbrush within 24 hr, increase oral and taking get plenty of rest. Instructed to follow up with PCP in 1-2 days return to ED for any concerns or worsening symptoms, such as increased fever, drooling, difficulty breathing, or nonstop vomiting.  Patient verbalize understanding, compliance, and agreement with treatment plan.              Attending Attestation:     Physician Attestation Statement for NP/PA:   I discussed this assessment and plan of this patient with the NP/PA, but I did not personally examine the patient. The face to face encounter was performed by the NP/PA.    Other NP/PA Attestation Additions:    History of Present Illness: 33M with fever and ST    Medical Decision Making: +strep, bicillin IM given                    Clinical Impression:   The encounter diagnosis was Strep pharyngitis.                             Khurram Becker NP  01/05/19 1639       Betsy Gann MD  01/06/19 2266

## 2019-01-09 ENCOUNTER — OFFICE VISIT (OUTPATIENT)
Dept: FAMILY MEDICINE | Facility: HOSPITAL | Age: 34
End: 2019-01-09
Attending: FAMILY MEDICINE
Payer: MEDICAID

## 2019-01-09 ENCOUNTER — OFFICE VISIT (OUTPATIENT)
Dept: URGENT CARE | Facility: CLINIC | Age: 34
End: 2019-01-09
Payer: MEDICAID

## 2019-01-09 VITALS
HEART RATE: 80 BPM | WEIGHT: 199.06 LBS | DIASTOLIC BLOOD PRESSURE: 82 MMHG | SYSTOLIC BLOOD PRESSURE: 130 MMHG | HEIGHT: 71 IN | BODY MASS INDEX: 27.87 KG/M2

## 2019-01-09 VITALS
TEMPERATURE: 98 F | BODY MASS INDEX: 27.72 KG/M2 | RESPIRATION RATE: 18 BRPM | HEIGHT: 71 IN | WEIGHT: 198 LBS | OXYGEN SATURATION: 98 % | SYSTOLIC BLOOD PRESSURE: 149 MMHG | HEART RATE: 73 BPM | DIASTOLIC BLOOD PRESSURE: 87 MMHG

## 2019-01-09 DIAGNOSIS — J02.0 STREP THROAT: Primary | ICD-10-CM

## 2019-01-09 DIAGNOSIS — J02.9 SORE THROAT: ICD-10-CM

## 2019-01-09 DIAGNOSIS — Z76.89 RETURN TO WORK EVALUATION: ICD-10-CM

## 2019-01-09 DIAGNOSIS — G89.29 CHRONIC BILATERAL LOW BACK PAIN WITH RIGHT-SIDED SCIATICA: Primary | ICD-10-CM

## 2019-01-09 DIAGNOSIS — M54.41 CHRONIC BILATERAL LOW BACK PAIN WITH RIGHT-SIDED SCIATICA: Primary | ICD-10-CM

## 2019-01-09 LAB
CTP QC/QA: YES
S PYO RRNA THROAT QL PROBE: POSITIVE

## 2019-01-09 PROCEDURE — 87880 STREP A ASSAY W/OPTIC: CPT | Mod: QW,S$GLB,, | Performed by: NURSE PRACTITIONER

## 2019-01-09 PROCEDURE — 99213 OFFICE O/P EST LOW 20 MIN: CPT | Performed by: STUDENT IN AN ORGANIZED HEALTH CARE EDUCATION/TRAINING PROGRAM

## 2019-01-09 PROCEDURE — 99214 PR OFFICE/OUTPT VISIT, EST, LEVL IV, 30-39 MIN: ICD-10-PCS | Mod: S$GLB,,, | Performed by: NURSE PRACTITIONER

## 2019-01-09 PROCEDURE — 99214 OFFICE O/P EST MOD 30 MIN: CPT | Mod: S$GLB,,, | Performed by: NURSE PRACTITIONER

## 2019-01-09 PROCEDURE — 87880 POCT RAPID STREP A: ICD-10-PCS | Mod: QW,S$GLB,, | Performed by: NURSE PRACTITIONER

## 2019-01-09 RX ORDER — PENICILLIN V POTASSIUM 500 MG/1
500 TABLET, FILM COATED ORAL 2 TIMES DAILY
Qty: 20 TABLET | Refills: 0 | Status: SHIPPED | OUTPATIENT
Start: 2019-01-09 | End: 2019-01-19

## 2019-01-09 RX ORDER — PREDNISONE 20 MG/1
20 TABLET ORAL DAILY
Qty: 5 TABLET | Refills: 0 | Status: SHIPPED | OUTPATIENT
Start: 2019-01-09 | End: 2019-01-14

## 2019-01-09 NOTE — PROGRESS NOTES
Patient was just seen in the emergency room for strep throat patient was given antibiotic shot and a shot of Decadron.  Patient states that went away now pain in the throat came back.

## 2019-01-09 NOTE — PATIENT INSTRUCTIONS
Use warm salt water gargles to ease your throat pain. Warm salt water gargles as needed for sore throat-  1/2 tsp salt to 1 cup warm water, gargle as desired. Hot tea with honey helps soothe.       Pharyngitis: Strep (Confirmed)    You have had a positive test for strep throat. Strep throat is a contagious illness. It is spread by coughing, kissing or by touching others after touching your mouth or nose. Symptoms include throat pain that is worse with swallowing, aching all over, headache, and fever. It is treated with antibiotic medicine. This should help you start to feel better in 1 to 2 days.  Home care  · Rest at home. Drink plenty of fluids to you won't get dehydrated.  · No work or school for the first 2 days of taking the antibiotics. After this time, you will not be contagious. You can then return to school or work if you are feeling better.   · Take antibiotic medicine for the full 10 days, even if you feel better. This is very important to ensure the infection is treated. It is also important to prevent medicine-resistant germs from developing. If you were given an antibiotic shot, you don't need any more antibiotics.  · You may use acetaminophen or ibuprofen to control pain or fever, unless another medicine was prescribed for this. Talk with your doctor before taking these medicines if you have chronic liver or kidney disease. Also talk with your doctor if you have had a stomach ulcer or GI bleeding.  · Throat lozenges or sprays help reduce pain. Gargling with warm saltwater will also reduce throat pain. Dissolve 1/2 teaspoon of salt in 1 glass of warm water. This may be useful just before meals.   · Soft foods are OK. Avoid salty or spicy foods.  Follow-up care  Follow up with your healthcare provider or our staff if you don't get better over the next week.  When to seek medical advice  Call your healthcare provider right away if any of these occur:  · Fever of 100.4ºF (38ºC) or higher, or as directed  by your healthcare provider  · New or worsening ear pain, sinus pain, or headache  · Painful lumps in the back of neck  · Stiff neck  · Lymph nodes getting larger or becoming soft in the middle  · You can't swallow liquids or you can't open your mouth wide because of throat pain  · Signs of dehydration. These include very dark urine or no urine, sunken eyes, and dizziness.  · Trouble breathing or noisy breathing  · Muffled voice  · Rash  Date Last Reviewed: 4/13/2015 © 2000-2017 [employer]. [address] [place] [address]. All rights reserved. This information is not intended as a substitute for professional medical care. Always follow your healthcare professional's instructions.    Please drink plenty of fluids.  Please get plenty of rest.  Clear liquid diet as instructed for 2 - 3 days or until symptoms improve.  Please return here or go to the Emergency Department for any concerns or worsening of condition.  If you were prescribed antibiotics, please take them to completion.  If not allergic, please take over the counter Tylenol (Acetaminophen) as directed for control of pain and/or fever.  Motrin (advil) or Alleve may help a fever, but they may also worsen your Nausea and Vomiting.    Please follow up with your primary care doctor or specialist as needed.    Kike Banks MD  [phone]    If you  smoke, please stop smoking.

## 2019-01-09 NOTE — PROGRESS NOTES
"Subjective:       Patient ID: Daniel Behlar is a 33 y.o. male.    Vitals:  height is 5' 11" (1.803 m) and weight is 89.8 kg (198 lb). His temperature is 97.9 °F (36.6 °C). His blood pressure is 149/87 (abnormal) and his pulse is 73. His respiration is 18 and oxygen saturation is 98%.     Chief Complaint: Sore Throat    Sore Throat    This is a recurrent problem. The current episode started in the past 7 days. The problem has been gradually worsening. The pain is worse on the left side. There has been no fever. The pain is at a severity of 7/10. The pain is moderate. Associated symptoms include ear pain and trouble swallowing. Pertinent negatives include no congestion, coughing, shortness of breath, stridor or vomiting. He has had exposure to strep. Treatments tried: Antibiotics        Constitution: Negative for chills, sweating, fatigue and fever.   HENT: Positive for ear pain, sore throat and trouble swallowing. Negative for congestion, sinus pain, sinus pressure and voice change.    Neck: Positive for painful lymph nodes.   Eyes: Negative for eye redness.   Respiratory: Negative for chest tightness, cough, sputum production, bloody sputum, COPD, shortness of breath, stridor, wheezing and asthma.    Gastrointestinal: Negative for nausea and vomiting.   Musculoskeletal: Negative for muscle ache.   Skin: Negative for rash.   Allergic/Immunologic: Negative for seasonal allergies and asthma.   Hematologic/Lymphatic: Positive for swollen lymph nodes.       Objective:      Physical Exam   Constitutional: He is oriented to person, place, and time. He appears well-developed and well-nourished. He is cooperative.  Non-toxic appearance. He does not appear ill. No distress.   HENT:   Head: Normocephalic and atraumatic.   Right Ear: Hearing, tympanic membrane, external ear and ear canal normal.   Left Ear: Hearing, tympanic membrane, external ear and ear canal normal.   Nose: Nose normal. No mucosal edema, rhinorrhea or nasal " deformity. No epistaxis. Right sinus exhibits no maxillary sinus tenderness and no frontal sinus tenderness. Left sinus exhibits no maxillary sinus tenderness and no frontal sinus tenderness.   Mouth/Throat: Uvula is midline and mucous membranes are normal. No trismus in the jaw. Normal dentition. No uvula swelling. Posterior oropharyngeal erythema present. Tonsils are 3+ on the right. Tonsils are 3+ on the left. Tonsillar exudate.   Eyes: Conjunctivae and lids are normal. No scleral icterus.   Sclera clear bilat   Neck: Trachea normal, full passive range of motion without pain and phonation normal. Neck supple.   Cardiovascular: Normal rate, regular rhythm, normal heart sounds, intact distal pulses and normal pulses.   Pulmonary/Chest: Effort normal and breath sounds normal. No respiratory distress.   Abdominal: Soft. Normal appearance and bowel sounds are normal. He exhibits no distension. There is no tenderness.   Musculoskeletal: Normal range of motion. He exhibits no edema or deformity.   Lymphadenopathy:     He has cervical adenopathy.        Right cervical: Superficial cervical adenopathy present.        Left cervical: Superficial cervical adenopathy present.   Neurological: He is alert and oriented to person, place, and time. He exhibits normal muscle tone. Coordination normal.   Skin: Skin is warm, dry and intact. He is not diaphoretic. No pallor.   Psychiatric: He has a normal mood and affect. His speech is normal and behavior is normal. Judgment and thought content normal. Cognition and memory are normal.   Nursing note and vitals reviewed.      Results for orders placed or performed in visit on 01/09/19   POCT rapid strep A   Result Value Ref Range    Rapid Strep A Screen Positive (A) Negative     Acceptable Yes      Assessment:       1. Sore throat        Plan:         Sore throat  -     POCT rapid strep A      Patient Instructions   Use warm salt water gargles to ease your throat pain.  Warm salt water gargles as needed for sore throat-  1/2 tsp salt to 1 cup warm water, gargle as desired. Hot tea with honey helps soothe.       Pharyngitis: Strep (Confirmed)    You have had a positive test for strep throat. Strep throat is a contagious illness. It is spread by coughing, kissing or by touching others after touching your mouth or nose. Symptoms include throat pain that is worse with swallowing, aching all over, headache, and fever. It is treated with antibiotic medicine. This should help you start to feel better in 1 to 2 days.  Home care  · Rest at home. Drink plenty of fluids to you won't get dehydrated.  · No work or school for the first 2 days of taking the antibiotics. After this time, you will not be contagious. You can then return to school or work if you are feeling better.   · Take antibiotic medicine for the full 10 days, even if you feel better. This is very important to ensure the infection is treated. It is also important to prevent medicine-resistant germs from developing. If you were given an antibiotic shot, you don't need any more antibiotics.  · You may use acetaminophen or ibuprofen to control pain or fever, unless another medicine was prescribed for this. Talk with your doctor before taking these medicines if you have chronic liver or kidney disease. Also talk with your doctor if you have had a stomach ulcer or GI bleeding.  · Throat lozenges or sprays help reduce pain. Gargling with warm saltwater will also reduce throat pain. Dissolve 1/2 teaspoon of salt in 1 glass of warm water. This may be useful just before meals.   · Soft foods are OK. Avoid salty or spicy foods.  Follow-up care  Follow up with your healthcare provider or our staff if you don't get better over the next week.  When to seek medical advice  Call your healthcare provider right away if any of these occur:  · Fever of 100.4ºF (38ºC) or higher, or as directed by your healthcare provider  · New or worsening ear  pain, sinus pain, or headache  · Painful lumps in the back of neck  · Stiff neck  · Lymph nodes getting larger or becoming soft in the middle  · You can't swallow liquids or you can't open your mouth wide because of throat pain  · Signs of dehydration. These include very dark urine or no urine, sunken eyes, and dizziness.  · Trouble breathing or noisy breathing  · Muffled voice  · Rash  Date Last Reviewed: 4/13/2015 © 2000-2017 [employer]. [address] [place] [address]. All rights reserved. This information is not intended as a substitute for professional medical care. Always follow your healthcare professional's instructions.    Please drink plenty of fluids.  Please get plenty of rest.  Clear liquid diet as instructed for 2 - 3 days or until symptoms improve.  Please return here or go to the Emergency Department for any concerns or worsening of condition.  If you were prescribed antibiotics, please take them to completion.  If not allergic, please take over the counter Tylenol (Acetaminophen) as directed for control of pain and/or fever.  Motrin (advil) or Alleve may help a fever, but they may also worsen your Nausea and Vomiting.    Please follow up with your primary care doctor or specialist as needed.    Kike Banks MD  [phone]    If you  smoke, please stop smoking.

## 2019-01-10 NOTE — PROGRESS NOTES
Subjective:       Patient ID: Daniel Behlar is a 33 y.o. male.    Chief Complaint: Right Sciatica     HPI   Patient with a pmhx of right sided sciatica, presents today for clearance to return to work. Patient states that last time he was seen he underwent physical therapy. Patient states that he tolerated it well and noticed daily improvements. Since completion, his pain and weakness in his right lower extremity has resolved. Patient states that he is at full capacity and able to return to work. Patient has no other complaints at this time.     Review of Systems   Constitutional: Negative for activity change and appetite change.   Eyes: Negative for photophobia and redness.   Respiratory: Negative for chest tightness and shortness of breath.    Cardiovascular: Negative for chest pain, palpitations and leg swelling.   Gastrointestinal: Negative for abdominal pain, constipation, diarrhea, nausea and vomiting.   Endocrine: Negative for polyuria.   Genitourinary: Negative for flank pain, hematuria and urgency.   Musculoskeletal: Negative for arthralgias and back pain.   Skin: Negative for color change.   Allergic/Immunologic: Negative for environmental allergies.   Neurological: Negative for headaches.   Hematological: Negative for adenopathy.   Psychiatric/Behavioral: Negative for agitation.       Objective:      Vitals:    01/09/19 1413   BP: 130/82   Pulse: 80     Physical Exam   Constitutional: He is oriented to person, place, and time. He appears well-developed and well-nourished.   HENT:   Head: Normocephalic and atraumatic.   Eyes: EOM are normal. Pupils are equal, round, and reactive to light.   Neck: Normal range of motion. Neck supple.   Cardiovascular: Normal rate, regular rhythm and normal heart sounds.   Pulmonary/Chest: Effort normal and breath sounds normal.   Abdominal: Soft. Bowel sounds are normal.   Musculoskeletal: Normal range of motion.   Neurological: He is alert and oriented to person, place, and  time.   Skin: Skin is warm and dry. Capillary refill takes less than 2 seconds.   Psychiatric: He has a normal mood and affect.       Assessment:       1. Chronic bilateral low back pain with right-sided sciatica    2. Return to work evaluation        Plan:       Chronic bilateral low back pain with right-sided sciatica  - Patient with full functional capacity   - Resolved     Return to work evaluation  - Not provided to patient to return to work with out restrictions as of 1/10/19     Follow-up if symptoms worsen or fail to improve.      Kike Banks MD  [employer] [facility], PGY-1

## 2019-08-12 ENCOUNTER — OFFICE VISIT (OUTPATIENT)
Dept: FAMILY MEDICINE | Facility: HOSPITAL | Age: 34
End: 2019-08-12
Attending: SPECIALIST
Payer: MEDICAID

## 2019-08-12 VITALS
HEIGHT: 71 IN | HEART RATE: 61 BPM | DIASTOLIC BLOOD PRESSURE: 70 MMHG | BODY MASS INDEX: 28.92 KG/M2 | SYSTOLIC BLOOD PRESSURE: 119 MMHG | WEIGHT: 206.56 LBS

## 2019-08-12 DIAGNOSIS — K21.9 GASTROESOPHAGEAL REFLUX DISEASE WITHOUT ESOPHAGITIS: ICD-10-CM

## 2019-08-12 DIAGNOSIS — S93.502A SPRAIN OF LEFT GREAT TOE, INITIAL ENCOUNTER: Primary | ICD-10-CM

## 2019-08-12 PROCEDURE — 99213 OFFICE O/P EST LOW 20 MIN: CPT | Performed by: STUDENT IN AN ORGANIZED HEALTH CARE EDUCATION/TRAINING PROGRAM

## 2019-08-12 RX ORDER — OMEPRAZOLE 20 MG/1
20 CAPSULE, DELAYED RELEASE ORAL DAILY
Qty: 30 CAPSULE | Refills: 3 | Status: ON HOLD | OUTPATIENT
Start: 2019-08-12 | End: 2019-12-30 | Stop reason: HOSPADM

## 2019-08-12 NOTE — PROGRESS NOTES
Subjective:       Patient ID: Daniel Behlar is a 34 y.o. male.    Chief Complaint: Toe Pain     HPI     35 y/o male with pmhx of GERD presents to clinic complaining of Left Toe pain. 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 Patient had continued daily activities despite pain localized to base of left toe, 5/10, worse with movement and improved with rest. Denies any loss of sensation and strength intact. No notable muscle wasting. Patient endorses epigastric pain with heartburn. Patient eats spicy food and drinks coffee , which have exacerbated his pain. Patient took omeprazole in the past which had improved his pain, however was not taking the medication on empty stomach in past. Dietary and lifestyle modifications were discussed during the visit.     Review of Systems   Constitutional: Negative for activity change, appetite change, chills and fever.   HENT: Negative for trouble swallowing.    Eyes: Negative for visual disturbance.   Respiratory: Negative for shortness of breath and wheezing.    Cardiovascular: Negative for chest pain and palpitations.   Gastrointestinal: Negative for abdominal pain, diarrhea, nausea and vomiting.   Endocrine: Negative for cold intolerance and heat intolerance.   Skin: Negative for color change.   Allergic/Immunologic: Negative for immunocompromised state.   Neurological: Negative for headaches.   Hematological: Negative for adenopathy.   Psychiatric/Behavioral: Negative for agitation.       Objective:      Vitals:    08/12/19 1119   BP: 119/70   Pulse: 61     Physical Exam   Constitutional: He is oriented to person, place, and time. He appears well-developed and well-nourished.   HENT:   Head: Normocephalic and atraumatic.   Eyes: Pupils are equal, round, and reactive to light. EOM are normal.   Neck: Normal range of motion. Neck supple.   Cardiovascular: Normal rate, regular rhythm, normal heart sounds and intact distal pulses. Exam reveals  no gallop and no friction rub.   No murmur heard.  Pulmonary/Chest: Effort normal and breath sounds normal. He has no wheezes. He has no rales.   Abdominal: Soft. Bowel sounds are normal. He exhibits no distension. There is no tenderness.   Musculoskeletal: Normal range of motion.   Tenderness to palpation at the base of 1st toe. Pain elicited upon flexion of 1st toe. No loss of sensation. ROM intact. No discoloration.    Neurological: He is alert and oriented to person, place, and time.   Skin: Skin is warm and dry. Capillary refill takes less than 2 seconds.   Psychiatric: He has a normal mood and affect.       Assessment:       1. Sprain of left great toe, initial encounter    2. Gastroesophageal reflux disease without esophagitis        Plan:       Sprain of left great toe, initial encounter  - Likely Grade 1. Low suspicion of fracture.   - NSAIDS BIDWM x 7 days   - RICE   - Paul Toe taping   - Memory Foam Foot Wear   - Supportive Shoe Insert     Gastroesophageal reflux disease without esophagitis  -     omeprazole (PRILOSEC) 20 MG capsule; Take 1 capsule (20 mg total) by mouth once daily.  Dispense: 30 capsule; Refill: 3  -     8-12 week PPI trial. Will consider EGD if no improvement.       Follow up in about 3 months (around 11/12/2019).      Kike Banks MD  [employer] [facility], PGY-2

## 2019-08-22 ENCOUNTER — OCCUPATIONAL HEALTH (OUTPATIENT)
Dept: URGENT CARE | Facility: CLINIC | Age: 34
End: 2019-08-22

## 2019-08-22 DIAGNOSIS — Z02.83 ENCOUNTER FOR DRUG SCREENING: Primary | ICD-10-CM

## 2019-08-22 LAB — BREATH ALCOHOL: 0

## 2019-08-22 PROCEDURE — 80305 DRUG TEST PRSMV DIR OPT OBS: CPT | Mod: S$GLB,,, | Performed by: PREVENTIVE MEDICINE

## 2019-08-22 PROCEDURE — 82075 ASSAY OF BREATH ETHANOL: CPT | Mod: S$GLB,,, | Performed by: PREVENTIVE MEDICINE

## 2019-08-22 PROCEDURE — 80305 OOH NON-DOT DRUG SCREEN: ICD-10-PCS | Mod: S$GLB,,, | Performed by: PREVENTIVE MEDICINE

## 2019-08-22 PROCEDURE — 82075 POCT BREATH ALCOHOL TEST: ICD-10-PCS | Mod: S$GLB,,, | Performed by: PREVENTIVE MEDICINE

## 2019-11-20 ENCOUNTER — OFFICE VISIT (OUTPATIENT)
Dept: FAMILY MEDICINE | Facility: HOSPITAL | Age: 34
End: 2019-11-20
Attending: SPECIALIST
Payer: MEDICAID

## 2019-11-20 VITALS
WEIGHT: 210.75 LBS | BODY MASS INDEX: 29.5 KG/M2 | DIASTOLIC BLOOD PRESSURE: 87 MMHG | HEIGHT: 71 IN | SYSTOLIC BLOOD PRESSURE: 144 MMHG | HEART RATE: 101 BPM

## 2019-11-20 DIAGNOSIS — F33.2 SEVERE EPISODE OF RECURRENT MAJOR DEPRESSIVE DISORDER, WITHOUT PSYCHOTIC FEATURES: Primary | ICD-10-CM

## 2019-11-20 DIAGNOSIS — F41.1 GAD (GENERALIZED ANXIETY DISORDER): ICD-10-CM

## 2019-11-20 PROCEDURE — 99213 OFFICE O/P EST LOW 20 MIN: CPT | Performed by: STUDENT IN AN ORGANIZED HEALTH CARE EDUCATION/TRAINING PROGRAM

## 2019-11-20 RX ORDER — VENLAFAXINE HYDROCHLORIDE 75 MG/1
75 CAPSULE, EXTENDED RELEASE ORAL DAILY
Qty: 30 CAPSULE | Refills: 2 | Status: ON HOLD | OUTPATIENT
Start: 2019-11-20 | End: 2019-12-30 | Stop reason: HOSPADM

## 2019-11-20 RX ORDER — RAMELTEON 8 MG/1
8 TABLET ORAL NIGHTLY
Qty: 30 TABLET | Refills: 2 | Status: SHIPPED | OUTPATIENT
Start: 2019-11-20 | End: 2019-12-20
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
## 2019-11-21 ENCOUNTER — TELEPHONE (OUTPATIENT)
Dept: FAMILY MEDICINE | Facility: HOSPITAL | Age: 34
End: 2019-11-21

## 2019-12-24 ENCOUNTER — HOSPITAL ENCOUNTER (EMERGENCY)
Facility: HOSPITAL | Age: 34
Discharge: PSYCHIATRIC OR SUBSTANCE USE FACILITY | End: 2019-12-25
Attending: EMERGENCY MEDICINE
Payer: MEDICAID

## 2019-12-24 DIAGNOSIS — F19.10 POLYSUBSTANCE ABUSE: ICD-10-CM

## 2019-12-24 DIAGNOSIS — F32.A DEPRESSION, UNSPECIFIED DEPRESSION TYPE: Primary | ICD-10-CM

## 2019-12-24 LAB
ALBUMIN SERPL BCP-MCNC: 4.4 G/DL (ref 3.5–5.2)
ALP SERPL-CCNC: 92 U/L (ref 55–135)
ALT SERPL W/O P-5'-P-CCNC: 20 U/L (ref 10–44)
AMPHET+METHAMPHET UR QL: NEGATIVE
AMPHET+METHAMPHET UR QL: NEGATIVE
ANION GAP SERPL CALC-SCNC: 16 MMOL/L (ref 8–16)
APAP SERPL-MCNC: <3 UG/ML (ref 10–20)
AST SERPL-CCNC: 20 U/L (ref 10–40)
BARBITURATES UR QL SCN>200 NG/ML: NEGATIVE
BARBITURATES UR QL SCN>200 NG/ML: NEGATIVE
BASOPHILS # BLD AUTO: 0.04 K/UL (ref 0–0.2)
BASOPHILS NFR BLD: 0.3 % (ref 0–1.9)
BENZODIAZ UR QL SCN>200 NG/ML: NEGATIVE
BENZODIAZ UR QL SCN>200 NG/ML: NEGATIVE
BILIRUB SERPL-MCNC: 0.6 MG/DL (ref 0.1–1)
BILIRUB UR QL STRIP: NEGATIVE
BUN SERPL-MCNC: 12 MG/DL (ref 6–20)
BZE UR QL SCN: NORMAL
BZE UR QL SCN: NORMAL
CALCIUM SERPL-MCNC: 9.6 MG/DL (ref 8.7–10.5)
CANNABINOIDS UR QL SCN: NORMAL
CANNABINOIDS UR QL SCN: NORMAL
CHLORIDE SERPL-SCNC: 105 MMOL/L (ref 95–110)
CLARITY UR: CLEAR
CO2 SERPL-SCNC: 20 MMOL/L (ref 23–29)
COLOR UR: YELLOW
CREAT SERPL-MCNC: 0.9 MG/DL (ref 0.5–1.4)
CREAT UR-MCNC: 269.3 MG/DL (ref 23–375)
CREAT UR-MCNC: 271.4 MG/DL (ref 23–375)
DIFFERENTIAL METHOD: ABNORMAL
EOSINOPHIL # BLD AUTO: 0.5 K/UL (ref 0–0.5)
EOSINOPHIL NFR BLD: 4.2 % (ref 0–8)
ERYTHROCYTE [DISTWIDTH] IN BLOOD BY AUTOMATED COUNT: 12.8 % (ref 11.5–14.5)
EST. GFR  (AFRICAN AMERICAN): >60 ML/MIN/1.73 M^2
EST. GFR  (NON AFRICAN AMERICAN): >60 ML/MIN/1.73 M^2
ETHANOL SERPL-MCNC: 42 MG/DL
GLUCOSE SERPL-MCNC: 83 MG/DL (ref 70–110)
GLUCOSE UR QL STRIP: NEGATIVE
HCT VFR BLD AUTO: 45.7 % (ref 40–54)
HGB BLD-MCNC: 15.8 G/DL (ref 14–18)
HGB UR QL STRIP: ABNORMAL
KETONES UR QL STRIP: NEGATIVE
LEUKOCYTE ESTERASE UR QL STRIP: NEGATIVE
LYMPHOCYTES # BLD AUTO: 2 K/UL (ref 1–4.8)
LYMPHOCYTES NFR BLD: 16.1 % (ref 18–48)
MCH RBC QN AUTO: 31 PG (ref 27–31)
MCHC RBC AUTO-ENTMCNC: 34.6 G/DL (ref 32–36)
MCV RBC AUTO: 90 FL (ref 82–98)
METHADONE UR QL SCN>300 NG/ML: NEGATIVE
METHADONE UR QL SCN>300 NG/ML: NEGATIVE
MONOCYTES # BLD AUTO: 1.1 K/UL (ref 0.3–1)
MONOCYTES NFR BLD: 9 % (ref 4–15)
NEUTROPHILS # BLD AUTO: 8.7 K/UL (ref 1.8–7.7)
NEUTROPHILS NFR BLD: 70.4 % (ref 38–73)
NITRITE UR QL STRIP: NEGATIVE
OPIATES UR QL SCN: NEGATIVE
OPIATES UR QL SCN: NEGATIVE
PCP UR QL SCN>25 NG/ML: NEGATIVE
PCP UR QL SCN>25 NG/ML: NEGATIVE
PH UR STRIP: 6 [PH] (ref 5–8)
PLATELET # BLD AUTO: 300 K/UL (ref 150–350)
PMV BLD AUTO: 8.3 FL (ref 9.2–12.9)
POTASSIUM SERPL-SCNC: 3.9 MMOL/L (ref 3.5–5.1)
PROT SERPL-MCNC: 7.6 G/DL (ref 6–8.4)
PROT UR QL STRIP: NEGATIVE
RBC # BLD AUTO: 5.09 M/UL (ref 4.6–6.2)
SODIUM SERPL-SCNC: 141 MMOL/L (ref 136–145)
SP GR UR STRIP: >=1.03 (ref 1–1.03)
TOXICOLOGY INFORMATION: NORMAL
TOXICOLOGY INFORMATION: NORMAL
URN SPEC COLLECT METH UR: ABNORMAL
UROBILINOGEN UR STRIP-ACNC: 1 EU/DL
WBC # BLD AUTO: 12.39 K/UL (ref 3.9–12.7)

## 2019-12-24 PROCEDURE — 80307 DRUG TEST PRSMV CHEM ANLYZR: CPT

## 2019-12-24 PROCEDURE — 85025 COMPLETE CBC W/AUTO DIFF WBC: CPT

## 2019-12-24 PROCEDURE — 96372 THER/PROPH/DIAG INJ SC/IM: CPT

## 2019-12-24 PROCEDURE — 80320 DRUG SCREEN QUANTALCOHOLS: CPT

## 2019-12-24 PROCEDURE — 99285 EMERGENCY DEPT VISIT HI MDM: CPT | Mod: 25

## 2019-12-24 PROCEDURE — 80329 ANALGESICS NON-OPIOID 1 OR 2: CPT

## 2019-12-24 PROCEDURE — 81003 URINALYSIS AUTO W/O SCOPE: CPT | Mod: 59

## 2019-12-24 PROCEDURE — 25000003 PHARM REV CODE 250: Performed by: EMERGENCY MEDICINE

## 2019-12-24 PROCEDURE — 63600175 PHARM REV CODE 636 W HCPCS: Performed by: EMERGENCY MEDICINE

## 2019-12-24 PROCEDURE — 80053 COMPREHEN METABOLIC PANEL: CPT

## 2019-12-24 RX ORDER — HALOPERIDOL 5 MG/ML
5 INJECTION INTRAMUSCULAR
Status: COMPLETED | OUTPATIENT
Start: 2019-12-24 | End: 2019-12-24

## 2019-12-24 RX ORDER — LORAZEPAM 1 MG/1
2 TABLET ORAL
Status: COMPLETED | OUTPATIENT
Start: 2019-12-24 | End: 2019-12-24

## 2019-12-24 RX ORDER — DIPHENHYDRAMINE HCL 25 MG
25 CAPSULE ORAL
Status: COMPLETED | OUTPATIENT
Start: 2019-12-24 | End: 2019-12-24

## 2019-12-24 RX ADMIN — DIPHENHYDRAMINE HYDROCHLORIDE 25 MG: 25 CAPSULE ORAL at 10:12

## 2019-12-24 RX ADMIN — HALOPERIDOL LACTATE 5 MG: 5 INJECTION, SOLUTION INTRAMUSCULAR at 10:12

## 2019-12-24 RX ADMIN — LORAZEPAM 2 MG: 1 TABLET ORAL at 10:12

## 2019-12-25 VITALS
OXYGEN SATURATION: 97 % | BODY MASS INDEX: 27.3 KG/M2 | WEIGHT: 195 LBS | DIASTOLIC BLOOD PRESSURE: 55 MMHG | SYSTOLIC BLOOD PRESSURE: 104 MMHG | HEIGHT: 71 IN | HEART RATE: 84 BPM | TEMPERATURE: 99 F | RESPIRATION RATE: 18 BRPM

## 2019-12-25 PROBLEM — F14.10 COCAINE ABUSE: Status: ACTIVE | Noted: 2019-12-25

## 2019-12-25 PROBLEM — Z13.9 ENCOUNTER FOR MEDICAL SCREENING EXAMINATION: Status: ACTIVE | Noted: 2019-01-09

## 2019-12-25 PROBLEM — F32.A DEPRESSION: Status: ACTIVE | Noted: 2019-12-25

## 2019-12-25 PROBLEM — F10.10 ALCOHOL ABUSE: Status: ACTIVE | Noted: 2019-12-25

## 2019-12-25 NOTE — ED NOTES
\[facility]\"" unit 18 here to transport patient to [facility].  1 bag of belongings given to [facility] staff to send with patient.
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
## 2020-01-14 ENCOUNTER — OFFICE VISIT (OUTPATIENT)
Dept: FAMILY MEDICINE | Facility: HOSPITAL | Age: 35
End: 2020-01-14
Payer: MEDICAID

## 2020-01-14 DIAGNOSIS — F32.A DEPRESSION, UNSPECIFIED DEPRESSION TYPE: ICD-10-CM

## 2020-01-14 DIAGNOSIS — F50.9 EATING DISORDER, UNSPECIFIED TYPE: ICD-10-CM

## 2020-01-14 DIAGNOSIS — F10.99 ALCOHOL-RELATED DISORDER: ICD-10-CM

## 2020-01-14 PROCEDURE — 99211 OFF/OP EST MAY X REQ PHY/QHP: CPT | Performed by: PSYCHOLOGIST
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
## 2020-01-29 ENCOUNTER — OFFICE VISIT (OUTPATIENT)
Dept: FAMILY MEDICINE | Facility: HOSPITAL | Age: 35
End: 2020-01-29
Attending: FAMILY MEDICINE
Payer: MEDICAID

## 2020-01-29 VITALS — BODY MASS INDEX: 28.17 KG/M2 | HEIGHT: 71 IN

## 2020-01-29 DIAGNOSIS — F10.10 ALCOHOL ABUSE: ICD-10-CM

## 2020-01-29 DIAGNOSIS — K21.9 GASTROESOPHAGEAL REFLUX DISEASE WITHOUT ESOPHAGITIS: ICD-10-CM

## 2020-01-29 DIAGNOSIS — F31.77 BIPOLAR 1 DISORDER, MIXED, PARTIAL REMISSION: Primary | ICD-10-CM

## 2020-01-29 PROCEDURE — 99213 OFFICE O/P EST LOW 20 MIN: CPT | Performed by: STUDENT IN AN ORGANIZED HEALTH CARE EDUCATION/TRAINING PROGRAM

## 2020-01-29 RX ORDER — PANTOPRAZOLE SODIUM 40 MG/1
40 TABLET, DELAYED RELEASE ORAL DAILY
Qty: 30 TABLET | Refills: 12 | Status: SHIPPED | OUTPATIENT
Start: 2020-01-29 | End: 2020-04-02 | Stop reason: SDUPTHER

## 2020-01-29 RX ORDER — QUETIAPINE FUMARATE 100 MG/1
100 TABLET, FILM COATED ORAL NIGHTLY
Qty: 30 TABLET | Refills: 12 | Status: SHIPPED | OUTPATIENT
Start: 2020-01-29 | End: 2020-02-13

## 2020-01-29 RX ORDER — MIRTAZAPINE 30 MG/1
30 TABLET, FILM COATED ORAL NIGHTLY
Qty: 30 TABLET | Refills: 12 | Status: SHIPPED | OUTPATIENT
Start: 2020-01-29 | End: 2020-01-29 | Stop reason: SDUPTHER

## 2020-01-29 RX ORDER — MIRTAZAPINE 30 MG/1
30 TABLET, FILM COATED ORAL NIGHTLY
Qty: 30 TABLET | Refills: 12 | Status: SHIPPED | OUTPATIENT
Start: 2020-01-29 | End: 2020-02-13

## 2020-01-29 RX ORDER — QUETIAPINE FUMARATE 100 MG/1
100 TABLET, FILM COATED ORAL NIGHTLY
Qty: 30 TABLET | Refills: 12 | Status: SHIPPED | OUTPATIENT
Start: 2020-01-29 | End: 2020-01-29 | Stop reason: SDUPTHER

## 2020-01-29 RX ORDER — PANTOPRAZOLE SODIUM 40 MG/1
40 TABLET, DELAYED RELEASE ORAL DAILY
Qty: 30 TABLET | Refills: 12 | Status: SHIPPED | OUTPATIENT
Start: 2020-01-29 | End: 2020-01-29 | Stop reason: SDUPTHER

## 2020-01-29 NOTE — PROGRESS NOTES
"Subjective:       Patient ID: Daniel Behlar is a 34 y.o. male.    Chief Complaint: Bipolar Management     Mr. Behlar was seen in the clinic today to follow-up for a medication refill for mirtazapine and quetiapine for unspecified depressive disorder.  He was recently hospitalized at [facility] in the beginning of the year after his wife called the  on him. He was coming off a 24-hour alcohol and cocaine binge, feeling how sad it was that his stepbrother took his own life and left his family behind 8 years ago, then wanting his wife to know that he would never do that to his family, and to demonstrate this, taking out his hand gun and unloading it in front of her. This alarmed her, and she called the police. When the police arrived he walked outside, with his hands up and said "guys I need help." The police called an ambulance, and he was voluntarily admitted to [facility]. He thrived in the hospital thanks to the structure and groups, he likened it to his time in the , which he looks upon fondly. He enjoys the routine of his job and home life now as well, and finds much to live for in his family. He stopped drinking after the preceding incident and has been 35 days sober. He plans to maintain his sobriety, and attends [employer] [facility]. He is trying to get set up with a psychiatrist, but doesn't yet have an appointment on the books.    He denies any side effects from the mirtazapine and quetiapine. He has an increased appetite, and gained weight, but he is not worried about this because he walks 7-10 miles a day on the job site.    The gun he unloaded during the christmas incident was confiscated by the [email or web address] police. He still has guns in the home that are locked in a closet as he enjoys using them at the local gun club. He was encouraged to give the guns to a trusted relative for safekeeping for the time being. He denies SI/HI, hallucinations, fatigue, anorexia, insomnia, " anhedonia.    Review of Systems   Constitutional: Positive for appetite change and unexpected weight change. Negative for fatigue.   HENT: Negative for trouble swallowing.    Eyes: Negative for photophobia and visual disturbance.   Respiratory: Negative for shortness of breath.    Cardiovascular: Negative for chest pain and palpitations.   Gastrointestinal: Negative for abdominal pain, diarrhea, nausea and vomiting.   Endocrine: Negative for cold intolerance and heat intolerance.   Genitourinary: Negative for hematuria.   Musculoskeletal: Negative for arthralgias.   Skin: Negative for color change.   Allergic/Immunologic: Negative for immunocompromised state.   Neurological: Negative for headaches.   Hematological: Negative for adenopathy.   Psychiatric/Behavioral: Negative for agitation, behavioral problems, confusion, decreased concentration, dysphoric mood, hallucinations, self-injury, sleep disturbance and suicidal ideas. The patient is hyperactive. The patient is not nervous/anxious.        Objective:       VSS.    Physical Exam   Constitutional: He is oriented to person, place, and time. He appears well-developed and well-nourished. No distress.   HENT:   Head: Normocephalic and atraumatic.   Eyes: Pupils are equal, round, and reactive to light. EOM are normal.   Neck: Normal range of motion. Neck supple.   Cardiovascular: Normal rate, regular rhythm and normal heart sounds. Exam reveals no gallop and no friction rub.   No murmur heard.  Pulmonary/Chest: Effort normal and breath sounds normal.   Abdominal: Soft. Bowel sounds are normal. He exhibits no distension. There is no tenderness.   Neurological: He is alert and oriented to person, place, and time.   Skin: Skin is warm and dry. Capillary refill takes less than 2 seconds. He is not diaphoretic.   Psychiatric: Judgment and thought content normal.   Some pressured speech noted, elevated mood and grandiosity.        Assessment:       1. Bipolar 1 disorder,  mixed, partial remission    2. Gastroesophageal reflux disease without esophagitis    3. Alcohol abuse        Plan:       Bipolar 1 disorder, mixed, partial remission  -     QUEtiapine (SEROQUEL) 100 MG Tab; Take 1 tablet (100 mg total) by mouth nightly.  Dispense: 30 tablet; Refill: 12  -     mirtazapine (REMERON) 30 MG tablet; Take 1 tablet (30 mg total) by mouth every evening.  Dispense: 30 tablet; Refill: 12  -     Will continue to monitor weight   -     Patient instructed to call the clinic if symptoms change or call 911 if patient has suicidal thoughts     Gastroesophageal reflux disease without esophagitis  -     pantoprazole (PROTONIX) 40 MG tablet; Take 1 tablet (40 mg total) by mouth once daily.  Dispense: 30 tablet; Refill: 12    Alcohol Abuse          -     Patient continues to be involved in counseling.          -     Will continue to monitor     Follow up in about 1 month (around 2/29/2020).     Kike Banks MD   [facility], PGY-2

## 2020-02-10 ENCOUNTER — TELEPHONE (OUTPATIENT)
Dept: FAMILY MEDICINE | Facility: HOSPITAL | Age: 35
End: 2020-02-10

## 2020-02-10 NOTE — TELEPHONE ENCOUNTER
----- Message from Laura Diaz, Patient Care Assistant sent at 2/10/2020  4:18 PM CST -----  Contact: contact pt at [phone]  Pt called to get scheduled with Dr. Banks. He said Dr. Banks told him to call when ever he needed to see him to get scheduled. He called Dr. Banks does not have anything available until Feb 20.  If the Dr can give the pt a call.

## 2020-02-11 ENCOUNTER — TELEPHONE (OUTPATIENT)
Dept: FAMILY MEDICINE | Facility: HOSPITAL | Age: 35
End: 2020-02-11

## 2020-02-11 NOTE — TELEPHONE ENCOUNTER
Please overbook me and have him see me this week. He is high risk and needs to be seen this week. Thank you.

## 2020-02-13 ENCOUNTER — OFFICE VISIT (OUTPATIENT)
Dept: FAMILY MEDICINE | Facility: HOSPITAL | Age: 35
End: 2020-02-13
Attending: FAMILY MEDICINE
Payer: MEDICAID

## 2020-02-13 VITALS
BODY MASS INDEX: 31.84 KG/M2 | SYSTOLIC BLOOD PRESSURE: 142 MMHG | WEIGHT: 214.94 LBS | HEIGHT: 69 IN | DIASTOLIC BLOOD PRESSURE: 90 MMHG

## 2020-02-13 DIAGNOSIS — F31.60 BIPOLAR 1 DISORDER, MIXED: Primary | ICD-10-CM

## 2020-02-13 PROCEDURE — 99213 OFFICE O/P EST LOW 20 MIN: CPT | Performed by: STUDENT IN AN ORGANIZED HEALTH CARE EDUCATION/TRAINING PROGRAM

## 2020-02-13 RX ORDER — QUETIAPINE FUMARATE 200 MG/1
200 TABLET, FILM COATED ORAL NIGHTLY
Qty: 30 TABLET | Refills: 11 | Status: SHIPPED | OUTPATIENT
Start: 2020-02-13 | End: 2020-04-02 | Stop reason: SDUPTHER

## 2020-02-13 RX ORDER — FLUOXETINE 20 MG/1
20 TABLET ORAL DAILY
Qty: 30 TABLET | Refills: 3 | Status: SHIPPED | OUTPATIENT
Start: 2020-02-13 | End: 2020-04-02 | Stop reason: SDUPTHER

## 2020-02-13 NOTE — PROGRESS NOTES
"Subjective:       Patient ID: Daniel Behlar is a 34 y.o. male.    Chief Complaint: Bipolar Management       Patient now returns to clinic today for follow up and states that his depression/anxiety symptoms have continued to worsen since last being seen. Patient states that he has been getting more sleep however on current regimen. Patient denies any auditory or visual hallucinations. Denies any homicidal or suicidial ideation. Patient continues to have support at home. Tolerating medication regimen well.     1/29/20   Mr. Behlar was seen in the clinic today to follow-up for a medication refill for mirtazapine and quetiapine for unspecified depressive disorder.  He was recently hospitalized at [facility] in the beginning of the year after his wife called the  on him. He was coming off a 24-hour alcohol and cocaine binge, feeling how sad it was that his stepbrother took his own life and left his family behind 8 years ago, then wanting his wife to know that he would never do that to his family, and to demonstrate this, taking out his hand gun and unloading it in front of her. This alarmed her, and she called the police. When the police arrived he walked outside, with his hands up and said "guys I need help." The police called an ambulance, and he was voluntarily admitted to [facility]. He thrived in the hospital thanks to the structure and groups, he likened it to his time in the , which he looks upon fondly. He enjoys the routine of his job and home life now as well, and finds much to live for in his family. He stopped drinking after the preceding incident and has been 35 days sober. He plans to maintain his sobriety, and attends [facility]. He is trying to get set up with a psychiatrist, but doesn't yet have an appointment on the books.    He denies any side effects from the mirtazapine and quetiapine. He has an increased appetite, and gained weight, but he is not worried about this " because he walks 7-10 miles a day on the job site.    The gun he unloaded during the maco incident was confiscated by the [place] police. He still has guns in the home that are locked in a closet as he enjoys using them at the local gun club. He was encouraged to give the guns to a trusted relative for safekeeping for the time being. He denies SI/HI, hallucinations, fatigue, anorexia, insomnia, anhedonia.    Review of Systems   Constitutional: Negative for appetite change, fatigue and unexpected weight change.   HENT: Negative for trouble swallowing.    Eyes: Negative for photophobia and visual disturbance.   Respiratory: Negative for shortness of breath.    Cardiovascular: Negative for chest pain and palpitations.   Gastrointestinal: Negative for abdominal pain, diarrhea, nausea and vomiting.   Endocrine: Negative for cold intolerance and heat intolerance.   Genitourinary: Negative for hematuria.   Musculoskeletal: Negative for arthralgias.   Skin: Negative for color change.   Allergic/Immunologic: Negative for immunocompromised state.   Neurological: Negative for headaches.   Hematological: Negative for adenopathy.   Psychiatric/Behavioral: Positive for dysphoric mood. Negative for agitation, behavioral problems, confusion, decreased concentration, hallucinations, self-injury, sleep disturbance and suicidal ideas. The patient is nervous/anxious. The patient is not hyperactive.        Objective:     Vitals:    02/13/20 1503   BP: (!) 142/90   Body mass index is 31.74 kg/m².    Physical Exam   Constitutional: He is oriented to person, place, and time. He appears well-developed and well-nourished. No distress.   HENT:   Head: Normocephalic and atraumatic.   Eyes: Pupils are equal, round, and reactive to light. EOM are normal.   Neck: Normal range of motion. Neck supple.   Cardiovascular: Normal rate, regular rhythm and normal heart sounds. Exam reveals no gallop and no friction rub.   No murmur  heard.  Pulmonary/Chest: Effort normal and breath sounds normal.   Abdominal: Soft. Bowel sounds are normal. He exhibits no distension. There is no tenderness.   Neurological: He is alert and oriented to person, place, and time.   Skin: Skin is warm and dry. Capillary refill takes less than 2 seconds. He is not diaphoretic.   Psychiatric: Judgment and thought content normal.   Some pressured speech noted, elevated mood and grandiosity.        Assessment:       1. Bipolar 1 disorder, mixed        Plan:       Bipolar 1 disorder, mixed, partial remission  -     Start QUEtiapine (SEROQUEL) 200 MG qPM  -     Start Fluoxetine 20mg PO daily   -     Patient instructed to call the clinic if symptoms change or call 911 if patient has suicidal thoughts           Follow up in about 4 weeks (around 3/12/2020).     Kike Banks MD   [facility], PGY-2

## 2020-03-30 PROBLEM — Z13.9 ENCOUNTER FOR MEDICAL SCREENING EXAMINATION: Status: RESOLVED | Noted: 2019-01-09 | Resolved: 2020-03-30

## 2020-03-31 ENCOUNTER — PATIENT MESSAGE (OUTPATIENT)
Dept: FAMILY MEDICINE | Facility: HOSPITAL | Age: 35
End: 2020-03-31

## 2020-04-02 ENCOUNTER — LAB VISIT (OUTPATIENT)
Dept: LAB | Facility: HOSPITAL | Age: 35
End: 2020-04-02
Attending: STUDENT IN AN ORGANIZED HEALTH CARE EDUCATION/TRAINING PROGRAM
Payer: MEDICAID

## 2020-04-02 ENCOUNTER — E-VISIT (OUTPATIENT)
Dept: FAMILY MEDICINE | Facility: HOSPITAL | Age: 35
End: 2020-04-02

## 2020-04-02 DIAGNOSIS — F31.9 BIPOLAR DEPRESSION: Primary | ICD-10-CM

## 2020-04-02 DIAGNOSIS — Z20.2 ENCOUNTER FOR ASSESSMENT OF STD EXPOSURE: ICD-10-CM

## 2020-04-02 DIAGNOSIS — K21.9 GASTROESOPHAGEAL REFLUX DISEASE WITHOUT ESOPHAGITIS: ICD-10-CM

## 2020-04-02 PROCEDURE — 86703 HIV-1/HIV-2 1 RESULT ANTBDY: CPT

## 2020-04-02 PROCEDURE — 36415 COLL VENOUS BLD VENIPUNCTURE: CPT

## 2020-04-02 PROCEDURE — 86592 SYPHILIS TEST NON-TREP QUAL: CPT

## 2020-04-02 RX ORDER — FLUOXETINE 20 MG/1
20 TABLET ORAL DAILY
Qty: 30 TABLET | Refills: 12 | Status: SHIPPED | OUTPATIENT
Start: 2020-04-02 | End: 2020-04-07

## 2020-04-02 RX ORDER — PANTOPRAZOLE SODIUM 40 MG/1
40 TABLET, DELAYED RELEASE ORAL DAILY
Qty: 30 TABLET | Refills: 12 | Status: SHIPPED | OUTPATIENT
Start: 2020-04-02 | End: 2020-04-07

## 2020-04-02 RX ORDER — QUETIAPINE FUMARATE 200 MG/1
200 TABLET, FILM COATED ORAL NIGHTLY
Qty: 30 TABLET | Refills: 11 | Status: SHIPPED | OUTPATIENT
Start: 2020-04-02 | End: 2020-07-10

## 2020-04-02 NOTE — PROGRESS NOTES
Patient ID: Daniel Behlar is a 35 y.o. male.    Chief Complaint: Bipolar Management     HPI   35-year-old male with a past medical history of bipolar depression, who presents to clinic via telemedicine visit for follow-up.  Patient states that his symptoms of bipolar have been uncontrolled as he was not taking his medication as prescribed due to change insurance.  Patient's symptoms were controlled prior to recent manic episode with regimen of Seroquel 200 mg q.h.s. and fluoxetine 20 mg p.o. daily.  Patient states that he hired an escort and had sexual intercourse that was unprotected.  Patient denies any symptoms following exposure of unprotected sexual intercourse but concern that he may have contracted a sexually transmitted disease.  Patient does not want to have sexual relations with his wife until he is cleared.  Patient denies any suicidal or homicidal ideations.  Denies any auditory or visual hallucinations.  Patient is in agreement with the plan and all questions answered.    Review of Systems   Constitutional: Negative for activity change and appetite change.   HENT: Negative for trouble swallowing.    Eyes: Negative for visual disturbance.   Respiratory: Negative for shortness of breath.    Cardiovascular: Negative for chest pain and palpitations.   Gastrointestinal: Negative for abdominal distention, diarrhea, nausea and vomiting.   Endocrine: Negative for cold intolerance and heat intolerance.   Genitourinary: Negative for flank pain.   Musculoskeletal: Negative for arthralgias.   Skin: Negative for color change.   Allergic/Immunologic: Negative for immunocompromised state.   Neurological: Negative for headaches.   Hematological: Negative for adenopathy.   Psychiatric/Behavioral: Positive for decreased concentration and sleep disturbance. Negative for agitation. The patient is hyperactive.        Objective:      Limited objective findings excluding vitals and limited physical exam.  Physical Exam     Patient in no acute distress.  Alert and oriented to person place time and situation.  Speech pressured.  Anxious.  Elevated mood.  No active psychosis.   Assessment:       1. Bipolar depression    2. Encounter for assessment of STD exposure    3. Gastroesophageal reflux disease without esophagitis        Plan:       Bipolar depression  Continue Seroquel 200 mg p.o. q.h.s. and fluoxetine 20 mg p.o. daily    Encounter for assessment of STD exposure  Orders for gonorrhea/chlamydia, RPR and HIV placed.  Will follow-up lab results.    Gastroesophageal reflux disease without esophagitis  Continue pantoprazole 40 mg p.o. daily    Follow up in 1 month     Kike Banks MD   [facility], PGY-2

## 2020-04-03 LAB
HIV 1+2 AB+HIV1 P24 AG SERPL QL IA: NEGATIVE
RPR SER QL: NORMAL

## 2020-04-03 NOTE — TELEPHONE ENCOUNTER
I assume primary medical responsibility for this patient. I have reviewed the history, physical, and assessement & treatment plan with the resident and agree that the care is reasonable and necessary. This service has been performed by a resident via audio-only telemedicine with immediate supervision available by attending. If necessary, an addendum of additional findings or evaluation beyond the resident documentation will be noted below.    Bipolar w/ manic episode after running low on meds, plans to restart. Had high risk sex while manic, testing ordered    Padmini Garcia MD

## 2020-04-07 DIAGNOSIS — F31.60 BIPOLAR 1 DISORDER, MIXED: Primary | ICD-10-CM

## 2020-04-07 DIAGNOSIS — A63.0 GENITAL WARTS: Primary | ICD-10-CM

## 2020-04-07 RX ORDER — PANTOPRAZOLE SODIUM 40 MG/1
40 TABLET, DELAYED RELEASE ORAL DAILY
Qty: 30 TABLET | Refills: 11 | Status: SHIPPED | OUTPATIENT
Start: 2020-04-07 | End: 2020-07-31 | Stop reason: SDUPTHER

## 2020-04-07 RX ORDER — FLUOXETINE 20 MG/1
20 TABLET ORAL DAILY
Qty: 30 TABLET | Refills: 11 | Status: SHIPPED | OUTPATIENT
Start: 2020-04-07 | End: 2020-05-27

## 2020-04-12 ENCOUNTER — PATIENT MESSAGE (OUTPATIENT)
Dept: FAMILY MEDICINE | Facility: HOSPITAL | Age: 35
End: 2020-04-12

## 2020-04-13 ENCOUNTER — TELEPHONE (OUTPATIENT)
Dept: FAMILY MEDICINE | Facility: HOSPITAL | Age: 35
End: 2020-04-13

## 2020-04-13 ENCOUNTER — PATIENT MESSAGE (OUTPATIENT)
Dept: FAMILY MEDICINE | Facility: HOSPITAL | Age: 35
End: 2020-04-13

## 2020-04-14 ENCOUNTER — PATIENT MESSAGE (OUTPATIENT)
Dept: FAMILY MEDICINE | Facility: HOSPITAL | Age: 35
End: 2020-04-14

## 2020-04-16 ENCOUNTER — E-VISIT (OUTPATIENT)
Dept: FAMILY MEDICINE | Facility: HOSPITAL | Age: 35
End: 2020-04-16

## 2020-04-16 DIAGNOSIS — Z20.822 EXPOSURE TO COVID-19 VIRUS: ICD-10-CM

## 2020-04-16 DIAGNOSIS — Z11.3 SCREENING EXAMINATION FOR STD (SEXUALLY TRANSMITTED DISEASE): Primary | ICD-10-CM

## 2020-04-16 RX ORDER — AZITHROMYCIN 250 MG/1
TABLET, FILM COATED ORAL
Qty: 6 TABLET | Refills: 0 | Status: SHIPPED | OUTPATIENT
Start: 2020-04-16 | End: 2020-04-16

## 2020-04-16 RX ORDER — PENICILLIN V POTASSIUM 500 MG/1
500 TABLET, FILM COATED ORAL EVERY 8 HOURS
Qty: 30 TABLET | Refills: 0 | Status: SHIPPED | OUTPATIENT
Start: 2020-04-16 | End: 2020-04-26

## 2020-04-17 ENCOUNTER — LAB VISIT (OUTPATIENT)
Dept: LAB | Facility: HOSPITAL | Age: 35
End: 2020-04-17
Attending: STUDENT IN AN ORGANIZED HEALTH CARE EDUCATION/TRAINING PROGRAM
Payer: MEDICAID

## 2020-04-17 ENCOUNTER — LAB VISIT (OUTPATIENT)
Dept: INTERNAL MEDICINE | Facility: CLINIC | Age: 35
End: 2020-04-17
Payer: MEDICAID

## 2020-04-17 DIAGNOSIS — Z11.3 SCREENING EXAMINATION FOR STD (SEXUALLY TRANSMITTED DISEASE): Primary | ICD-10-CM

## 2020-04-17 DIAGNOSIS — Z11.3 SCREENING EXAMINATION FOR STD (SEXUALLY TRANSMITTED DISEASE): ICD-10-CM

## 2020-04-17 DIAGNOSIS — Z20.822 EXPOSURE TO COVID-19 VIRUS: ICD-10-CM

## 2020-04-17 LAB — SARS-COV-2 RNA RESP QL NAA+PROBE: NOT DETECTED

## 2020-04-17 PROCEDURE — 36415 COLL VENOUS BLD VENIPUNCTURE: CPT

## 2020-04-17 PROCEDURE — U0002 COVID-19 LAB TEST NON-CDC: HCPCS

## 2020-04-17 PROCEDURE — 87536 HIV-1 QUANT&REVRSE TRNSCRPJ: CPT

## 2020-04-17 NOTE — PROGRESS NOTES
Subjective:       Patient ID: Daniel Behlar is a 35 y.o. male.    Chief Complaint: Sore Throat     HPI   35-year-old male a past medical history of bipolar depression, who presents to clinic via telemedicine visit with complaint sore throat and flu-like symptoms for last 4 days.  Patient states that he had recent exposure to co-worker who tested positive for COVID-19.  Patient also had recent unprotected sexual intercourse with escort within the last 2-3 weeks.  Initial HIV testing was negative and RPR negative.  Patient did not obtain urine testing for gonorrhea and chlamydia at last visit and will do so after today.  Patient states that he was evaluated by the nurse practitioner at his work who stated that he had anterior lymphadenopathy.  Patient also had recorded fever and sore throat as his chief complaint.  Patient reports that he has had previous episodes strep throat which is similar to his current presentation.  Denies any known exposures to strep throat or any sick contacts other than co-worker.  Patient reports he was able to  his psychiatric medications and has not missed a dose since last visit.  No reported manic episodes since the most recent one approximately 4 weeks ago after missing doses of meds.     Review of Systems   Constitutional: Positive for fatigue and fever. Negative for activity change and appetite change.   HENT: Positive for sore throat. Negative for trouble swallowing.    Eyes: Negative for visual disturbance.   Respiratory: Negative for shortness of breath.    Cardiovascular: Negative for chest pain and palpitations.   Gastrointestinal: Negative for abdominal distention, diarrhea, nausea and vomiting.   Endocrine: Negative for cold intolerance and heat intolerance.   Genitourinary: Negative for flank pain.   Musculoskeletal: Negative for arthralgias.   Skin: Negative for color change.   Allergic/Immunologic: Negative for immunocompromised state.   Neurological: Positive for  headaches.   Hematological: Negative for adenopathy.   Psychiatric/Behavioral: Negative for agitation.       Objective:      There were no vitals filed for this visit.     Physical Exam    Patient in mild acute distress.  Alert oriented x4.  Able to speak in full sentences.  Mood stable.  Judgment normal.  Assessment:       1. Streptococcal pharyngitis    2. Exposure to Covid-19 Virus    3. Encounter for assessment of STD exposure    4.  Bipolar Depression     Plan:       Streptococcal pharyngitis  - start penicillin V 500 mg q.8 hours times 10 days    Exposure to COVID-19   - orders placed for COVID-19 testing  - patient to undergo self isolation until resolution of symptoms if found positive     Encounter for assessment of STD exposure  - Concern for possible acute retroviral syndrome.   - HIV quant PCR ordered as patient with questionable time frame in regards to last HIV test obtained, which may cause HIV Ag/Ab to be  falsely negative.  - follow-up gonorrhea/chlamydia testing      Bipolar Depression   - Well controlled. No reported new manic episodes. Mood stable.   - Continue current regimen     Follow up to clinic if symptoms worsen.  Will notify patient of test results when available.     The patient location is: Home  The chief complaint leading to consultation is: Sore throat   Visit type: audio only  Total time spent with patient: 7 min   Each patient to whom he or she provides medical services by telemedicine is:  (1) informed of the relationship between the physician and patient and the respective role of any other health care provider with respect to management of the patient; and (2) notified that he or she may decline to receive medical services by telemedicine and may withdraw from such care at any time.    Kike Banks MD  [facility], PGY-2

## 2020-04-20 LAB
HIV UQ DATE RECEIVED: NORMAL
HIV UQ DATE REPORTED: NORMAL
HIV1 RNA # SERPL NAA+PROBE: <40 COPIES/ML
HIV1 RNA SERPL NAA+PROBE-LOG#: <1.6 LOG (10) COPIES/ML
HIV1 RNA SERPL QL NAA+PROBE: NOT DETECTED

## 2020-04-21 RX ORDER — PREDNISONE 10 MG/1
20 TABLET ORAL DAILY
Qty: 10 TABLET | Refills: 0 | Status: SHIPPED | OUTPATIENT
Start: 2020-04-21 | End: 2020-04-26

## 2020-04-21 NOTE — PROGRESS NOTES
Swollen adenoids s/p infectious process. Beginning to compromise airway. Patient to seek ED if airway becomes compromised. Prednisone 20mg x 5 days.     Kike Banks MD   [facility], PGY-2

## 2020-04-24 ENCOUNTER — NURSE TRIAGE (OUTPATIENT)
Dept: ADMINISTRATIVE | Facility: CLINIC | Age: 35
End: 2020-04-24

## 2020-04-24 NOTE — TELEPHONE ENCOUNTER
Pt c/o sore throat. Pt stated he has been seen by his PCP and is currently taking PCN and Prednisone. Pt stated that his mouth feels very dry and his tongue hurts. Instructed pt to go to urgent care now or follow up with PCP. Pt is scheduled to see his PCP on Monday, but would like to be seen sooner via virtual visit. Information given to BLAIRE, Ember Vizcaino, she will contact pt.  Pt notified and verbalized understanding.    Reason for Disposition   Drinking very little and has signs of dehydration (e.g., no urine > 12 hours, very dry mouth, very lightheaded)    Additional Information   Negative: Severe difficulty breathing (e.g., struggling for each breath, speaks in single words)   Negative: Sounds like a life-threatening emergency to the triager   Negative: Drooling or spitting out saliva (because can't swallow)   Negative: Unable to open mouth completely    Protocols used: SORE THROAT-A-OH

## 2020-04-27 ENCOUNTER — OFFICE VISIT (OUTPATIENT)
Dept: FAMILY MEDICINE | Facility: HOSPITAL | Age: 35
End: 2020-04-27
Payer: MEDICAID

## 2020-04-27 DIAGNOSIS — J02.0 STREP PHARYNGITIS: Primary | ICD-10-CM

## 2020-04-27 NOTE — PROGRESS NOTES
*This is a telephone encounter that is replacing a normal clinic visit. Due to COVID-19, we are limiting non-urgent clinic visits and I will address patient's concerns to the best of my ability via telephone. If I feel a clinic visit is necessary, proper arrangements will be made to do so.     Daniel Behlar is a 35 y.o. male with PMHx of bipolar disease, GERD  who attended a televisit with concerns regarding strep throat diagnosed last Friday by Dr. Banks. He states that his sore throat has improved. He states that he has had a red, sensitive tongue. He states that he has had strep throat many times throughout his life, and would be interested in an ENT referral. No nausea, vomiting, fever, rashes     Review of Systems (Negative unless checked off):  Constitutional:  [] fever, [] chills, [] weight loss, [] malaise/fatigue, [] diaphoresis.   HENT:   [] congestion, [x] sore throat, [] neck pain, reddened sensitive tongue  Eyes: [] blurred vision, []  double vision, [] photophobia,   Respiratory: [] cough, [] pleuritic chest pain, [] hemoptysis, [] sputum , [] shortness of breath, [] wheezing   Cardiovascular:  [] chest pain, [] palpitations, [] orthopnea, [] leg swelling, [] PND.   Gastrointestinal:  [] nausea, [] vomiting, [] abdominal pain, [] diarrhea, [] constipation, [] blood in stool, [] melena.   Genitourinary:  [] dysuria, [] urgency, [] frequency, [] hematuria, []   flank pain.   Musculoskeletal: [] myalgias, [] back pain, []  falls.   Skin:  []  rash.   Neurological:  [] dizziness, [] tingling, [] numbness, [] speech change, []  focal weakness, [] seizures, [] loss of consciousness, [] weakness  [] headaches.   Psychiatric/Behavioral: [] depression, [] Anxiety, [] suicidal ideas, [] hallucinations, [] memory loss, []  substance abuse.        Assessment/Plan:  36 yo man with strep pharyngitis and strawberry tongue, no rash  Diagnoses and all orders for this visit:    Strep pharyngitis    -Patient will  complete ABX as prescribed  -Patient reassured about strawberry tongue  -No rashes  -Patient given precautions to call if his condition fails to improve in a few days    Patient instructed to contact the clinic at [phone] with any additional questions or concerns.    Case discussed with staff.      Marry Mills MD   [facility]   04/27/2020 10:52 AM    This note was partially created using Cam[employer] Voice Recognition software. There may be occasional misinterpreted words, typos, or grammatical errors that were not caught upon review.     The patient location is: Home  The chief complaint leading to consultation is: sore throat  Visit type: Virtual visit with audio only without video due to inability to connect sufficiently  Total time spent with patient: 20 minutes  Each patient to whom he or she provides medical services by telemedicine is:  (1) informed of the relationship between the physician and patient and the respective role of any other health care provider with respect to management of the patient; and (2) notified that he or she may decline to receive medical services by telemedicine and may withdraw from such care at any time.

## 2020-04-30 NOTE — TELEPHONE ENCOUNTER
I assume primary medical responsibility for this patient. I have reviewed the history, physical, and assessement & treatment plan with the resident and agree that the care is reasonable and necessary. This service has been performed by a resident via audio-only telemedicine with immediate supervision available by attending. If necessary, an addendum of additional findings or evaluation beyond the resident documentation will be noted below.    Padmini Garcia MD

## 2020-05-14 DIAGNOSIS — A63.0 GENITAL WARTS: Primary | ICD-10-CM

## 2020-05-25 ENCOUNTER — PATIENT MESSAGE (OUTPATIENT)
Dept: FAMILY MEDICINE | Facility: HOSPITAL | Age: 35
End: 2020-05-25

## 2020-05-25 ENCOUNTER — HOSPITAL ENCOUNTER (EMERGENCY)
Facility: HOSPITAL | Age: 35
Discharge: HOME | End: 2020-05-25
Attending: EMERGENCY MEDICINE
Payer: MEDICAID

## 2020-05-25 VITALS
DIASTOLIC BLOOD PRESSURE: 81 MMHG | TEMPERATURE: 98 F | SYSTOLIC BLOOD PRESSURE: 130 MMHG | RESPIRATION RATE: 18 BRPM | WEIGHT: 210 LBS | HEART RATE: 79 BPM | BODY MASS INDEX: 31.01 KG/M2 | OXYGEN SATURATION: 100 %

## 2020-05-25 DIAGNOSIS — R10.11 RUQ ABDOMINAL PAIN: Primary | ICD-10-CM

## 2020-05-25 DIAGNOSIS — K80.50 BILIARY COLIC: ICD-10-CM

## 2020-05-25 DIAGNOSIS — R11.2 NON-INTRACTABLE VOMITING WITH NAUSEA, UNSPECIFIED VOMITING TYPE: ICD-10-CM

## 2020-05-25 LAB
ALBUMIN SERPL BCP-MCNC: 3.7 G/DL (ref 3.5–5.2)
ALP SERPL-CCNC: 74 U/L (ref 55–135)
ALT SERPL W/O P-5'-P-CCNC: 22 U/L (ref 10–44)
ANION GAP SERPL CALC-SCNC: 7 MMOL/L (ref 8–16)
AST SERPL-CCNC: 21 U/L (ref 10–40)
BILIRUB SERPL-MCNC: 0.2 MG/DL (ref 0.1–1)
BILIRUB UR QL STRIP: NEGATIVE
BUN SERPL-MCNC: 13 MG/DL (ref 6–20)
CALCIUM SERPL-MCNC: 9 MG/DL (ref 8.7–10.5)
CHLORIDE SERPL-SCNC: 104 MMOL/L (ref 95–110)
CLARITY UR: ABNORMAL
CO2 SERPL-SCNC: 30 MMOL/L (ref 23–29)
COLOR UR: YELLOW
CREAT SERPL-MCNC: 1 MG/DL (ref 0.5–1.4)
ERYTHROCYTE [DISTWIDTH] IN BLOOD BY AUTOMATED COUNT: 11.6 % (ref 11.5–14.5)
EST. GFR  (AFRICAN AMERICAN): >60 ML/MIN/1.73 M^2
EST. GFR  (NON AFRICAN AMERICAN): >60 ML/MIN/1.73 M^2
GLUCOSE SERPL-MCNC: 112 MG/DL (ref 70–110)
GLUCOSE UR QL STRIP: NEGATIVE
HCT VFR BLD AUTO: 39.4 % (ref 40–54)
HGB BLD-MCNC: 13.4 G/DL (ref 14–18)
HGB UR QL STRIP: NEGATIVE
KETONES UR QL STRIP: NEGATIVE
LEUKOCYTE ESTERASE UR QL STRIP: NEGATIVE
LIPASE SERPL-CCNC: 31 U/L (ref 4–60)
MCH RBC QN AUTO: 30.4 PG (ref 27–31)
MCHC RBC AUTO-ENTMCNC: 34 G/DL (ref 32–36)
MCV RBC AUTO: 89 FL (ref 82–98)
NITRITE UR QL STRIP: NEGATIVE
PH UR STRIP: >8 [PH] (ref 5–8)
PLATELET # BLD AUTO: 239 K/UL (ref 150–350)
PMV BLD AUTO: 8.8 FL (ref 9.2–12.9)
POTASSIUM SERPL-SCNC: 3.7 MMOL/L (ref 3.5–5.1)
PROT SERPL-MCNC: 6.8 G/DL (ref 6–8.4)
PROT UR QL STRIP: NEGATIVE
RBC # BLD AUTO: 4.41 M/UL (ref 4.6–6.2)
SODIUM SERPL-SCNC: 141 MMOL/L (ref 136–145)
SP GR UR STRIP: 1.01 (ref 1–1.03)
URN SPEC COLLECT METH UR: ABNORMAL
UROBILINOGEN UR STRIP-ACNC: NEGATIVE EU/DL
WBC # BLD AUTO: 12.57 K/UL (ref 3.9–12.7)

## 2020-05-25 PROCEDURE — 63600175 PHARM REV CODE 636 W HCPCS: Performed by: EMERGENCY MEDICINE

## 2020-05-25 PROCEDURE — 85027 COMPLETE CBC AUTOMATED: CPT

## 2020-05-25 PROCEDURE — 80053 COMPREHEN METABOLIC PANEL: CPT

## 2020-05-25 PROCEDURE — 81003 URINALYSIS AUTO W/O SCOPE: CPT

## 2020-05-25 PROCEDURE — 96361 HYDRATE IV INFUSION ADD-ON: CPT

## 2020-05-25 PROCEDURE — 83690 ASSAY OF LIPASE: CPT

## 2020-05-25 PROCEDURE — 99284 EMERGENCY DEPT VISIT MOD MDM: CPT | Mod: 25

## 2020-05-25 PROCEDURE — 25000003 PHARM REV CODE 250: Performed by: EMERGENCY MEDICINE

## 2020-05-25 PROCEDURE — 96374 THER/PROPH/DIAG INJ IV PUSH: CPT

## 2020-05-25 RX ORDER — ONDANSETRON 4 MG/1
4 TABLET, ORALLY DISINTEGRATING ORAL
Status: COMPLETED | OUTPATIENT
Start: 2020-05-25 | End: 2020-05-25

## 2020-05-25 RX ORDER — OXYCODONE AND ACETAMINOPHEN 5; 325 MG/1; MG/1
1 TABLET ORAL EVERY 6 HOURS PRN
Qty: 12 TABLET | Refills: 0 | Status: ON HOLD | OUTPATIENT
Start: 2020-05-25 | End: 2020-05-29

## 2020-05-25 RX ORDER — LIDOCAINE HYDROCHLORIDE 20 MG/ML
5 SOLUTION OROPHARYNGEAL
Status: COMPLETED | OUTPATIENT
Start: 2020-05-25 | End: 2020-05-25

## 2020-05-25 RX ORDER — DICYCLOMINE HYDROCHLORIDE 20 MG/1
20 TABLET ORAL 2 TIMES DAILY
Qty: 15 TABLET | Refills: 0 | Status: SHIPPED | OUTPATIENT
Start: 2020-05-25 | End: 2020-06-01

## 2020-05-25 RX ORDER — ONDANSETRON 2 MG/ML
4 INJECTION INTRAMUSCULAR; INTRAVENOUS
Status: DISCONTINUED | OUTPATIENT
Start: 2020-05-25 | End: 2020-05-25

## 2020-05-25 RX ORDER — DICYCLOMINE HYDROCHLORIDE 10 MG/1
20 CAPSULE ORAL
Status: COMPLETED | OUTPATIENT
Start: 2020-05-25 | End: 2020-05-25

## 2020-05-25 RX ORDER — ONDANSETRON 4 MG/1
4 TABLET, FILM COATED ORAL EVERY 6 HOURS
Qty: 12 TABLET | Refills: 0 | Status: ON HOLD | OUTPATIENT
Start: 2020-05-25 | End: 2020-05-29 | Stop reason: HOSPADM

## 2020-05-25 RX ORDER — MORPHINE SULFATE 4 MG/ML
4 INJECTION, SOLUTION INTRAMUSCULAR; INTRAVENOUS
Status: COMPLETED | OUTPATIENT
Start: 2020-05-25 | End: 2020-05-25

## 2020-05-25 RX ORDER — MAG HYDROX/ALUMINUM HYD/SIMETH 200-200-20
5 SUSPENSION, ORAL (FINAL DOSE FORM) ORAL
Status: COMPLETED | OUTPATIENT
Start: 2020-05-25 | End: 2020-05-25

## 2020-05-25 RX ORDER — SODIUM CHLORIDE 9 MG/ML
500 INJECTION, SOLUTION INTRAVENOUS
Status: COMPLETED | OUTPATIENT
Start: 2020-05-25 | End: 2020-05-25

## 2020-05-25 RX ADMIN — MORPHINE SULFATE 4 MG: 4 INJECTION INTRAVENOUS at 07:05

## 2020-05-25 RX ADMIN — DICYCLOMINE HYDROCHLORIDE 20 MG: 10 CAPSULE ORAL at 09:05

## 2020-05-25 RX ADMIN — ONDANSETRON 4 MG: 4 TABLET, ORALLY DISINTEGRATING ORAL at 07:05

## 2020-05-25 RX ADMIN — SODIUM CHLORIDE 500 ML: 0.9 INJECTION, SOLUTION INTRAVENOUS at 07:05

## 2020-05-25 RX ADMIN — LIDOCAINE HYDROCHLORIDE 5 ML: 20 SOLUTION ORAL; TOPICAL at 09:05

## 2020-05-25 RX ADMIN — ALUMINUM HYDROXIDE, MAGNESIUM HYDROXIDE, AND SIMETHICONE 5 ML: 200; 200; 20 SUSPENSION ORAL at 09:05

## 2020-05-25 NOTE — ED PROVIDER NOTES
"Encounter Date: 2020       History     Chief Complaint   Patient presents with    Abdominal Pain     RUQ/RLQ  pain described as cramping + emesis, last BM was today      Daniel Behlar is a 35 y.o. male who  has no past medical history on file.    The patient presents to the ED due to RUQ abdominal pain.  He reports symptoms started last night and got worse this morning.  He reports he ate spicy buffalo wings and "a lot of Airheads" prior to onset of symptoms.  He reports a few episodes of nausea with vomiting.  He denies any medical history, and took no medications prior to arrival.  However, he adds he has been diagnosed with gallstones in the past, and was told he may need his gallbladder removed in the future.  He denies any associated fever, chest pain, shortness of breath, respiratory symptoms, back/flank pain, or urinary complaints.  No prior abdominal surgery.  He denies any other concerns.        Review of patient's allergies indicates:  No Known Allergies  No past medical history on file.  Past Surgical History:   Procedure Laterality Date    ESOPHAGOGASTRODUODENOSCOPY       Family History   Problem Relation Age of Onset    No Known Problems Mother     No Known Problems Father     No Known Problems Sister     No Known Problems Brother      Social History     Tobacco Use    Smoking status: Former Smoker     Packs/day: 0.25     Types: Cigarettes     Last attempt to quit: 2018     Years since quittin.0    Smokeless tobacco: Never Used    Tobacco comment: quit smoking May 21, 2015, the day before first baby born   Substance Use Topics    Alcohol use: Yes     Frequency: Never     Drinks per session: Patient refused     Binge frequency: Never    Drug use: Yes     Frequency: 2.0 times per week     Types: Marijuana     Review of Systems   Constitutional: Negative for chills and fever.   HENT: Negative for sore throat.    Respiratory: Negative for shortness of breath.    Cardiovascular: " Negative for chest pain.   Gastrointestinal: Positive for abdominal pain, nausea and vomiting. Negative for constipation and diarrhea.   Genitourinary: Negative for dysuria, frequency and urgency.   Musculoskeletal: Negative for back pain.   Skin: Negative for rash and wound.   Neurological: Negative for weakness.   Hematological: Does not bruise/bleed easily.   Psychiatric/Behavioral: Negative for agitation, behavioral problems and confusion.       Physical Exam     Initial Vitals [05/25/20 0736]   BP Pulse Resp Temp SpO2   (!) 140/98 70 18 97.3 °F (36.3 °C) 100 %      MAP       --         Physical Exam    Nursing note and vitals reviewed.  Constitutional: He appears well-developed and well-nourished. He is not diaphoretic. No distress.   HENT:   Head: Normocephalic and atraumatic.   Mouth/Throat: Oropharynx is clear and moist.   Eyes: EOM are normal. Pupils are equal, round, and reactive to light.   Neck: No tracheal deviation present.   Cardiovascular: Normal rate, regular rhythm, normal heart sounds and intact distal pulses.   Pulmonary/Chest: Breath sounds normal. No stridor. No respiratory distress.   Abdominal: Soft. Bowel sounds are normal. He exhibits no distension and no mass. There is tenderness in the right upper quadrant. There is no rigidity, no rebound, no guarding, no CVA tenderness, no tenderness at McBurney's point and negative Godoy's sign.   Musculoskeletal: Normal range of motion. He exhibits no edema.   Neurological: He is alert and oriented to person, place, and time. No cranial nerve deficit or sensory deficit.   Skin: Skin is warm and dry. Capillary refill takes less than 2 seconds. No rash noted.   Psychiatric: He has a normal mood and affect. His behavior is normal. Thought content normal.         ED Course   Procedures  Labs Reviewed   CBC WITHOUT DIFFERENTIAL - Abnormal; Notable for the following components:       Result Value    RBC 4.41 (*)     Hemoglobin 13.4 (*)     Hematocrit 39.4  (*)     MPV 8.8 (*)     All other components within normal limits   COMPREHENSIVE METABOLIC PANEL - Abnormal; Notable for the following components:    CO2 30 (*)     Glucose 112 (*)     Anion Gap 7 (*)     All other components within normal limits   URINALYSIS, REFLEX TO URINE CULTURE - Abnormal; Notable for the following components:    Appearance, UA Hazy (*)     pH, UA >8.0 (*)     All other components within normal limits    Narrative:     Preferred Collection Type->Urine, Clean Catch   LIPASE          Imaging Results    None          Medical Decision Making:   History:   Old Medical Records: I decided to obtain old medical records.  Old Records Summarized: other records.       <> Summary of Records: Chart reviewed, no prior abdominal surgery or imaging.  Initial Assessment:   35-year-old male presents to ER due to right upper quadrant abdominal pain associated with nausea and vomiting, starting last night, persisting this morning.  Vitals reassuring, exam with right upper quadrant abdominal tenderness, no rebound or guarding, no Godoy's sign.  Will obtain labs, treat symptomatically, and reassess.  Differential Diagnosis:   Differential Diagnosis includes, but is not limited to:  Bowel obstruction, incarcerated/strangulated hernia, ileus, appendicitis, cholecystitis, aspirated foreign body, esophageal food impaction, biliary colic, colitis/diverticulitis, gastroenteritis, esophagitis, hepatitis, pancreatitis, GERD, PUD, constipation, nephrolithiasis, UTI/pyelonephritis.    Clinical Tests:   Lab Tests: Ordered and Reviewed  ED Management:  Labs reassuring, LFTs/lipase within normal limits.  Patient resting comfortably, asleep on reexamination.  Patient states his symptoms have significantly improved.  No active vomiting in ED.  No signs of infection or obstruction at this time.  Suspect patient's presentation most consistent with biliary colic given his history of gallstones and exacerbation by recent  diet.  Patient will be discharged with close PCP follow-up and symptomatic treatment.  He was also given general surgery Clinic information for evaluation of possible outpatient cholecystectomy.  Given strict return precautions including worsening symptoms, fever, inability tolerate p.o., or any other concerns.  Patient stable for discharge.    On re-evaluation, the patient's status has improved.  After complete ED evaluation, clinical impression is most consistent with biliary colic, RUQ abdominal pain.  PCP/Surgery follow-up within 2-3 days was recommended.    After taking into careful account the patient's history, physical exam findings, as well as empirical and objective data obtained throughout ED workup, I feel no emergent medical condition has been identified. No further evaluation or admission was felt to be required, and the patient is stable for discharge from the ED. The patient and any additional family present were updated with test results, overall clinical impression, and recommended further plan of care, including discharge instructions as provided and outpatient follow-up for continued evaluation and management as needed. All questions were answered. The patient expressed understanding and agreed with current plan for discharge and follow-up plan of care. Strict ED return precautions were provided, including return/worsening of current symptoms, new symptoms, or any other concerns.                                   Clinical Impression:       ICD-10-CM ICD-9-CM   1. RUQ abdominal pain R10.11 789.01   2. Biliary colic K80.50 574.20   3. Non-intractable vomiting with nausea, unspecified vomiting type R11.2 787.01             ED Disposition Condition    Discharge Stable        ED Prescriptions     Medication Sig Dispense Start Date End Date Auth. Provider    dicyclomine (BENTYL) 20 mg tablet Take 1 tablet (20 mg total) by mouth 2 (two) times daily. for 7 days 15 tablet 5/25/2020 6/1/2020 Demario DAWSON  MD Heather    oxyCODONE-acetaminophen (PERCOCET) 5-325 mg per tablet Take 1 tablet by mouth every 6 (six) hours as needed for Pain (severe pain). 12 tablet 5/25/2020 5/28/2020 Demario Romero MD    ondansetron (ZOFRAN) 4 MG tablet Take 1 tablet (4 mg total) by mouth every 6 (six) hours. for 3 days 12 tablet 5/25/2020 5/28/2020 Demario Romero MD        Follow-up Information     Follow up With Specialties Details Why Contact Info Additional Information    Kike Banks MD Family Medicine Schedule an appointment as soon as possible for a visit   [address] [facility]  [address]  [facility] [address]  [phone]       [facility] General Surgery Surgery Schedule an appointment as soon as possible for a visit   [address]  [facility] [address] [phone] 4th Floor [facility] [place] [place], [address] At this time [facility] will only use these entries [facility], [facility], and Emergency Department due to COVID-19 precautions.                                      Demario Romero MD  05/25/20 2442

## 2020-05-25 NOTE — ED NOTES
Pt resting comfortably in bed. Pt states pain is a 4 and is excited he is finally able to get some sleep. Pt denies needs at this time. Call light within reach, will continue to monitor.

## 2020-05-25 NOTE — DISCHARGE INSTRUCTIONS
Call your primary doctor for follow up and ultrasound of gallbladder as soon as possible.  Follow-up with your primary doctor or surgery clinic for evaluation of possible gallbladder removal surgery.  Begin a bland diet today, such as bread/rice/bananas/oatmeal; avoid heavy/greasy/fatty foods for the next 24 hours.   Drink plenty of fluids to stay hydrated.  You may take nausea medication as prescribed.   Follow-up with your primary care provider as soon as possible.  Return to the ED for severe nausea/vomiting and dehydration, high fever, severe abdominal pain, or any other concerns.

## 2020-05-25 NOTE — ED NOTES
Pt provided pillow and blanket. Pt informed of Pt denies needs at this time. Pt place on continuous pulse ox and NIBP. Call light within reach, will continue to monitor.

## 2020-05-25 NOTE — ED TRIAGE NOTES
Pt presents to ED with complaints of RUQ abdominal pain. Pt states the pain began this past Friday and has worsened. Pt states that he feel bloated and nauseous after he eats. Pt states that the pain has been keeping him up at night. Pt states that the pain feels like a cramp. Pt states he has a history of H. Pylori and states he was told he had gallbladder polyps  Pt denies CP, SOB, fever. Pt states pain 7/10.

## 2020-05-26 DIAGNOSIS — K80.50 BILIARY COLIC: Primary | ICD-10-CM

## 2020-05-27 ENCOUNTER — HOSPITAL ENCOUNTER (OUTPATIENT)
Facility: HOSPITAL | Age: 35
Discharge: HOME | End: 2020-05-29
Attending: EMERGENCY MEDICINE | Admitting: STUDENT IN AN ORGANIZED HEALTH CARE EDUCATION/TRAINING PROGRAM
Payer: MEDICAID

## 2020-05-27 ENCOUNTER — ANESTHESIA EVENT (OUTPATIENT)
Dept: SURGERY | Facility: HOSPITAL | Age: 35
End: 2020-05-27
Payer: MEDICAID

## 2020-05-27 ENCOUNTER — OFFICE VISIT (OUTPATIENT)
Dept: FAMILY MEDICINE | Facility: HOSPITAL | Age: 35
End: 2020-05-27
Attending: FAMILY MEDICINE
Payer: MEDICAID

## 2020-05-27 VITALS
HEIGHT: 71 IN | SYSTOLIC BLOOD PRESSURE: 145 MMHG | WEIGHT: 225.5 LBS | HEART RATE: 99 BPM | BODY MASS INDEX: 31.57 KG/M2 | DIASTOLIC BLOOD PRESSURE: 87 MMHG

## 2020-05-27 DIAGNOSIS — K80.00 CALCULUS OF GALLBLADDER WITH ACUTE CHOLECYSTITIS WITHOUT OBSTRUCTION: Primary | ICD-10-CM

## 2020-05-27 DIAGNOSIS — K21.9 CHRONIC GERD: ICD-10-CM

## 2020-05-27 DIAGNOSIS — R10.11 RUQ PAIN: Primary | ICD-10-CM

## 2020-05-27 DIAGNOSIS — K80.50 BILIARY COLIC: ICD-10-CM

## 2020-05-27 LAB
ALBUMIN SERPL BCP-MCNC: 4.4 G/DL (ref 3.5–5.2)
ALP SERPL-CCNC: 78 U/L (ref 55–135)
ALT SERPL W/O P-5'-P-CCNC: 31 U/L (ref 10–44)
ANION GAP SERPL CALC-SCNC: 9 MMOL/L (ref 8–16)
AST SERPL-CCNC: 22 U/L (ref 10–40)
BASOPHILS # BLD AUTO: 0.04 K/UL (ref 0–0.2)
BASOPHILS NFR BLD: 0.5 % (ref 0–1.9)
BILIRUB SERPL-MCNC: 0.4 MG/DL (ref 0.1–1)
BUN SERPL-MCNC: 13 MG/DL (ref 6–20)
CALCIUM SERPL-MCNC: 10.2 MG/DL (ref 8.7–10.5)
CHLORIDE SERPL-SCNC: 104 MMOL/L (ref 95–110)
CO2 SERPL-SCNC: 25 MMOL/L (ref 23–29)
CREAT SERPL-MCNC: 1.1 MG/DL (ref 0.5–1.4)
DIFFERENTIAL METHOD: ABNORMAL
EOSINOPHIL # BLD AUTO: 0.1 K/UL (ref 0–0.5)
EOSINOPHIL NFR BLD: 1.6 % (ref 0–8)
ERYTHROCYTE [DISTWIDTH] IN BLOOD BY AUTOMATED COUNT: 11.7 % (ref 11.5–14.5)
EST. GFR  (AFRICAN AMERICAN): >60 ML/MIN/1.73 M^2
EST. GFR  (NON AFRICAN AMERICAN): >60 ML/MIN/1.73 M^2
GLUCOSE SERPL-MCNC: 114 MG/DL (ref 70–110)
HCT VFR BLD AUTO: 42.6 % (ref 40–54)
HGB BLD-MCNC: 14.2 G/DL (ref 14–18)
IMM GRANULOCYTES # BLD AUTO: 0.05 K/UL (ref 0–0.04)
IMM GRANULOCYTES NFR BLD AUTO: 0.6 % (ref 0–0.5)
LIPASE SERPL-CCNC: 12 U/L (ref 4–60)
LYMPHOCYTES # BLD AUTO: 1.3 K/UL (ref 1–4.8)
LYMPHOCYTES NFR BLD: 14.5 % (ref 18–48)
MCH RBC QN AUTO: 29.5 PG (ref 27–31)
MCHC RBC AUTO-ENTMCNC: 33.3 G/DL (ref 32–36)
MCV RBC AUTO: 89 FL (ref 82–98)
MONOCYTES # BLD AUTO: 0.5 K/UL (ref 0.3–1)
MONOCYTES NFR BLD: 5.7 % (ref 4–15)
NEUTROPHILS # BLD AUTO: 6.8 K/UL (ref 1.8–7.7)
NEUTROPHILS NFR BLD: 77.1 % (ref 38–73)
NRBC BLD-RTO: 0 /100 WBC
PLATELET # BLD AUTO: 267 K/UL (ref 150–350)
PMV BLD AUTO: 8.9 FL (ref 9.2–12.9)
POTASSIUM SERPL-SCNC: 4.1 MMOL/L (ref 3.5–5.1)
PROT SERPL-MCNC: 8.4 G/DL (ref 6–8.4)
RBC # BLD AUTO: 4.81 M/UL (ref 4.6–6.2)
SARS-COV-2 RDRP RESP QL NAA+PROBE: NEGATIVE
SODIUM SERPL-SCNC: 138 MMOL/L (ref 136–145)
WBC # BLD AUTO: 8.8 K/UL (ref 3.9–12.7)

## 2020-05-27 PROCEDURE — 25000003 PHARM REV CODE 250: Performed by: STUDENT IN AN ORGANIZED HEALTH CARE EDUCATION/TRAINING PROGRAM

## 2020-05-27 PROCEDURE — 99213 OFFICE O/P EST LOW 20 MIN: CPT | Mod: 25 | Performed by: STUDENT IN AN ORGANIZED HEALTH CARE EDUCATION/TRAINING PROGRAM

## 2020-05-27 PROCEDURE — 80053 COMPREHEN METABOLIC PANEL: CPT

## 2020-05-27 PROCEDURE — G0378 HOSPITAL OBSERVATION PER HR: HCPCS

## 2020-05-27 PROCEDURE — 96375 TX/PRO/DX INJ NEW DRUG ADDON: CPT

## 2020-05-27 PROCEDURE — 63600175 PHARM REV CODE 636 W HCPCS: Performed by: EMERGENCY MEDICINE

## 2020-05-27 PROCEDURE — 85025 COMPLETE CBC W/AUTO DIFF WBC: CPT

## 2020-05-27 PROCEDURE — 96365 THER/PROPH/DIAG IV INF INIT: CPT

## 2020-05-27 PROCEDURE — 99285 EMERGENCY DEPT VISIT HI MDM: CPT | Mod: 25,27

## 2020-05-27 PROCEDURE — 96361 HYDRATE IV INFUSION ADD-ON: CPT

## 2020-05-27 PROCEDURE — 25000003 PHARM REV CODE 250: Performed by: EMERGENCY MEDICINE

## 2020-05-27 PROCEDURE — U0002 COVID-19 LAB TEST NON-CDC: HCPCS

## 2020-05-27 PROCEDURE — 63600175 PHARM REV CODE 636 W HCPCS: Performed by: STUDENT IN AN ORGANIZED HEALTH CARE EDUCATION/TRAINING PROGRAM

## 2020-05-27 PROCEDURE — 83690 ASSAY OF LIPASE: CPT

## 2020-05-27 RX ORDER — SODIUM CHLORIDE, SODIUM LACTATE, POTASSIUM CHLORIDE, CALCIUM CHLORIDE 600; 310; 30; 20 MG/100ML; MG/100ML; MG/100ML; MG/100ML
INJECTION, SOLUTION INTRAVENOUS CONTINUOUS
Status: DISCONTINUED | OUTPATIENT
Start: 2020-05-27 | End: 2020-05-29 | Stop reason: HOSPADM

## 2020-05-27 RX ORDER — ONDANSETRON 2 MG/ML
8 INJECTION INTRAMUSCULAR; INTRAVENOUS EVERY 8 HOURS PRN
Status: DISCONTINUED | OUTPATIENT
Start: 2020-05-27 | End: 2020-05-29 | Stop reason: HOSPADM

## 2020-05-27 RX ORDER — ONDANSETRON 2 MG/ML
4 INJECTION INTRAMUSCULAR; INTRAVENOUS EVERY 8 HOURS PRN
Status: DISCONTINUED | OUTPATIENT
Start: 2020-05-27 | End: 2020-05-27

## 2020-05-27 RX ORDER — VENLAFAXINE HYDROCHLORIDE 75 MG/1
CAPSULE, EXTENDED RELEASE ORAL
COMMUNITY
Start: 2020-04-07 | End: 2020-06-04

## 2020-05-27 RX ORDER — KETOROLAC TROMETHAMINE 30 MG/ML
15 INJECTION, SOLUTION INTRAMUSCULAR; INTRAVENOUS EVERY 6 HOURS
Status: DISCONTINUED | OUTPATIENT
Start: 2020-05-28 | End: 2020-05-29 | Stop reason: HOSPADM

## 2020-05-27 RX ORDER — MORPHINE SULFATE 2 MG/ML
4 INJECTION, SOLUTION INTRAMUSCULAR; INTRAVENOUS EVERY 4 HOURS PRN
Status: DISCONTINUED | OUTPATIENT
Start: 2020-05-27 | End: 2020-05-27

## 2020-05-27 RX ORDER — VENLAFAXINE HYDROCHLORIDE 75 MG/1
75 CAPSULE, EXTENDED RELEASE ORAL NIGHTLY
Status: DISCONTINUED | OUTPATIENT
Start: 2020-05-27 | End: 2020-05-29 | Stop reason: HOSPADM

## 2020-05-27 RX ORDER — PANTOPRAZOLE SODIUM 40 MG/1
40 TABLET, DELAYED RELEASE ORAL DAILY
Status: DISCONTINUED | OUTPATIENT
Start: 2020-05-28 | End: 2020-05-27

## 2020-05-27 RX ORDER — MIRTAZAPINE 30 MG/1
TABLET, FILM COATED ORAL
COMMUNITY
Start: 2020-05-19 | End: 2020-06-04

## 2020-05-27 RX ORDER — QUETIAPINE FUMARATE 100 MG/1
200 TABLET, FILM COATED ORAL NIGHTLY
Status: DISCONTINUED | OUTPATIENT
Start: 2020-05-27 | End: 2020-05-29 | Stop reason: HOSPADM

## 2020-05-27 RX ORDER — HYDROMORPHONE HYDROCHLORIDE 1 MG/ML
1 INJECTION, SOLUTION INTRAMUSCULAR; INTRAVENOUS; SUBCUTANEOUS EVERY 4 HOURS PRN
Status: DISCONTINUED | OUTPATIENT
Start: 2020-05-27 | End: 2020-05-27

## 2020-05-27 RX ORDER — ONDANSETRON 8 MG/1
8 TABLET, ORALLY DISINTEGRATING ORAL EVERY 8 HOURS PRN
Status: DISCONTINUED | OUTPATIENT
Start: 2020-05-27 | End: 2020-05-27

## 2020-05-27 RX ORDER — ACETAMINOPHEN 325 MG/1
650 TABLET ORAL EVERY 8 HOURS PRN
Status: DISCONTINUED | OUTPATIENT
Start: 2020-05-27 | End: 2020-05-29 | Stop reason: HOSPADM

## 2020-05-27 RX ORDER — PANTOPRAZOLE SODIUM 40 MG/1
40 TABLET, DELAYED RELEASE ORAL DAILY
Status: DISCONTINUED | OUTPATIENT
Start: 2020-05-27 | End: 2020-05-27

## 2020-05-27 RX ORDER — SODIUM CHLORIDE 0.9 % (FLUSH) 0.9 %
10 SYRINGE (ML) INJECTION
Status: DISCONTINUED | OUTPATIENT
Start: 2020-05-27 | End: 2020-05-29 | Stop reason: HOSPADM

## 2020-05-27 RX ORDER — TALC
6 POWDER (GRAM) TOPICAL NIGHTLY PRN
Status: DISCONTINUED | OUTPATIENT
Start: 2020-05-27 | End: 2020-05-29 | Stop reason: HOSPADM

## 2020-05-27 RX ORDER — OMEPRAZOLE 20 MG/1
CAPSULE, DELAYED RELEASE ORAL
Status: ON HOLD | COMMUNITY
Start: 2020-05-05 | End: 2020-05-27

## 2020-05-27 RX ORDER — HYDROCODONE BITARTRATE AND ACETAMINOPHEN 5; 325 MG/1; MG/1
1 TABLET ORAL EVERY 4 HOURS PRN
Status: DISCONTINUED | OUTPATIENT
Start: 2020-05-27 | End: 2020-05-29 | Stop reason: HOSPADM

## 2020-05-27 RX ORDER — SODIUM CHLORIDE 0.9 % (FLUSH) 0.9 %
10 SYRINGE (ML) INJECTION
Status: DISCONTINUED | OUTPATIENT
Start: 2020-05-27 | End: 2020-05-27

## 2020-05-27 RX ORDER — FLUOXETINE HYDROCHLORIDE 20 MG/1
20 CAPSULE ORAL DAILY
Status: DISCONTINUED | OUTPATIENT
Start: 2020-05-28 | End: 2020-05-27

## 2020-05-27 RX ORDER — FLUOXETINE HYDROCHLORIDE 20 MG/1
20 CAPSULE ORAL NIGHTLY
Status: DISCONTINUED | OUTPATIENT
Start: 2020-05-27 | End: 2020-05-29 | Stop reason: HOSPADM

## 2020-05-27 RX ORDER — FLUOXETINE HYDROCHLORIDE 20 MG/1
20 CAPSULE ORAL DAILY
COMMUNITY
Start: 2020-05-07 | End: 2020-06-18 | Stop reason: SDUPTHER

## 2020-05-27 RX ORDER — MORPHINE SULFATE 4 MG/ML
4 INJECTION, SOLUTION INTRAMUSCULAR; INTRAVENOUS EVERY 4 HOURS PRN
Status: DISCONTINUED | OUTPATIENT
Start: 2020-05-27 | End: 2020-05-29 | Stop reason: HOSPADM

## 2020-05-27 RX ORDER — QUETIAPINE FUMARATE 100 MG/1
TABLET, FILM COATED ORAL
COMMUNITY
Start: 2020-05-05 | End: 2020-05-27

## 2020-05-27 RX ORDER — VENLAFAXINE HYDROCHLORIDE 75 MG/1
75 CAPSULE, EXTENDED RELEASE ORAL DAILY
Status: DISCONTINUED | OUTPATIENT
Start: 2020-05-28 | End: 2020-05-27

## 2020-05-27 RX ADMIN — QUETIAPINE 200 MG: 100 TABLET ORAL at 11:05

## 2020-05-27 RX ADMIN — FLUOXETINE 20 MG: 20 CAPSULE ORAL at 11:05

## 2020-05-27 RX ADMIN — HYDROCODONE BITARTRATE AND ACETAMINOPHEN 1 TABLET: 5; 325 TABLET ORAL at 09:05

## 2020-05-27 RX ADMIN — MORPHINE SULFATE 4 MG: 4 INJECTION INTRAVENOUS at 07:05

## 2020-05-27 RX ADMIN — VENLAFAXINE HYDROCHLORIDE 75 MG: 75 CAPSULE, EXTENDED RELEASE ORAL at 11:05

## 2020-05-27 RX ADMIN — PIPERACILLIN AND TAZOBACTAM 4.5 G: 4; .5 INJECTION, POWDER, LYOPHILIZED, FOR SOLUTION INTRAVENOUS; PARENTERAL at 07:05

## 2020-05-27 RX ADMIN — PANTOPRAZOLE SODIUM 40 MG: 40 TABLET, DELAYED RELEASE ORAL at 07:05

## 2020-05-27 RX ADMIN — KETOROLAC TROMETHAMINE 15 MG: 30 INJECTION, SOLUTION INTRAMUSCULAR at 11:05

## 2020-05-27 RX ADMIN — SODIUM CHLORIDE, SODIUM LACTATE, POTASSIUM CHLORIDE, AND CALCIUM CHLORIDE: .6; .31; .03; .02 INJECTION, SOLUTION INTRAVENOUS at 09:05

## 2020-05-27 NOTE — ED NOTES
Patient stated that he started with abdominal pain and cramping Friday, Saturday and Sunday he was not able sleep due to pain in RUQ of abdomen. Patient reported that he had night sweats and chills, abdominal pain, N/V. Patient stated that he went to ER on Monday and was treated. Patient stated that on Tues the pain returned and he continued with the vomiting and pain. Patient stated that his pain is a 8 with any movement. Patient stated at rest his pain is a 0. Patient last PO intake was this am and had no vomiting since Tuesday. Patient changed into gown and awaiting US

## 2020-05-27 NOTE — PROGRESS NOTES
Progress Note   Family Medicine    Subjective:    Chief Complaint:   Chief Complaint   Patient presents with    Abdominal Pain       History of Present Illness:  35 y.o. male presents for abdominal pain    Patient reports that for the past few days he has had severe non radiating right upper quadrant pain that is severe and constant associated with subjective fevers at home that has not improved.  He reports having a history of gallstones and this pain is very severe and he reports that when he hit a bump in his car it ed the pain.  His last visit at the emergency department a few days ago and no imaging of his gallbladder was for.  Reports some mild nausea with no vomiting.  He reports no flank pain or dysuria or hematuria.  He reports no right lower quadrant pain.     He denies chest pain upon exertion, dyspnea, nausea, vomiting, diaphoresis, and syncope. No radiation of pain to the arm, jaw, or back. He does not endorse a ripping or tearing sensation. No pleuritic chest pain, unilateral leg swelling, calf tenderness, or calf pain.      The following portions of the patient's history were reviewed and updated as appropriate: allergies, current medications, past family history, past medical history, past social history, past surgical history and problem list.    No past medical history on file.    Past Surgical History:   Procedure Laterality Date    ESOPHAGOGASTRODUODENOSCOPY         Social History  Social History     Tobacco Use    Smoking status: Former Smoker     Packs/day: 0.25     Types: Cigarettes     Last attempt to quit: 2018     Years since quittin.0    Smokeless tobacco: Never Used    Tobacco comment: quit smoking May 21, 2015, the day before first baby born   Substance Use Topics    Alcohol use: Yes     Frequency: Never     Drinks per session: Patient refused     Binge frequency: Never    Drug use: Yes     Frequency: 2.0 times per week     Types: Marijuana       Family History   Problem  "Relation Age of Onset    No Known Problems Mother     No Known Problems Father     No Known Problems Sister     No Known Problems Brother      Review of patient's allergies indicates:  No Known Allergies    Review of Systems [Negative unless checked off]    General ROS: [x]fever, [x]chills, []weight loss, [x]malaise/fatigue.  ENT ROS: []congestion, []rhinorrhea,  []sore throat, []neck pain, []hearing loss.  Ophthalmic ROS:[]blurry vision, [] double vision, []photophobia, []eye pain.  Respiratory ROS: []cough, []pleuritic chest pain, []shortness of breath, []wheezing.  CVS ROS:[]chest pain, []dyspnea on exertion, []palpitations, []orthopnea, []leg swelling, []PND.   GI ROS: [x]nausea, []vomiting, [] epigastric pain, [x]abd pain, []diarrhea, []constipation, []blood/melena in stool.   Urology ROS:[]dysuria, []frequency, []flank pain,[] trouble voiding, [] hematuria.   MSK ROS: []myalgias, []joint pain, []muscular weakness,  []back pain, [] falls.   Derm ROS: []pruritis, []rash, []jaundice.  Neurological:[]dizziness,[]numbness,[]loss of consciousness, []weakness  []headaches.   Psych ROS: []hallucinations, []depression, []anxiety, []suicidal ideation.    Physical Examination  BP (!) 145/87   Pulse 99   Ht 5' 11" (1.803 m)   Wt 102.3 kg (225 lb 8.5 oz)   BMI 31.46 kg/m²   Wt Readings from Last 3 Encounters:   05/27/20 102.1 kg (225 lb)   05/27/20 102.3 kg (225 lb 8.5 oz)   05/25/20 95.3 kg (210 lb)     BP Readings from Last 3 Encounters:   05/27/20 (!) 154/78   05/27/20 (!) 145/87   05/25/20 130/81     Estimated body mass index is 31.46 kg/m² as calculated from the following:    Height as of this encounter: 5' 11" (1.803 m).    Weight as of this encounter: 102.3 kg (225 lb 8.5 oz).     General appearance: alert, cooperative, no distress  Eyes: pupils equal and reactive, extraocular eye movements intact   Ears: bilateral TM's and external ear canals normal   Nose: normal and patent, no erythema, discharge or " polyps   Sinuses: Normal paranasal sinuses without tenderness   Throat: mucous membranes moist, pharynx normal without lesions   Neck: no thyromegaly, trachea midline  Lungs: clear to auscultation, no wheezes, rales or rhonchi, symmetric air entry, no dullness to percussion bilaterally.  Heart: normal rate, regular rhythm, normal S1, S2, no murmurs, rubs, clicks or gallops, no displacement of the PMI.  Abdomen: soft, nontender, nondistended, no masses or organomegaly  tenderness noted RUQ neg nayak sign, positive psoas sign   no rebound tenderness noted  bowel sounds normal  no pulsatile masses  no CVA tenderness   Back: full range of motion, no tenderness, palpable spasm or pain on motion   Extremities: peripheral pulses normal, no pedal edema, no clubbing or cyanosis   Feet: warm, good capillary refill.  Integument: normal coloration and turgor, no rashes, no suspicious skin lesions noted.  Neurological:alert, oriented, normal speech, no focal findings or movement disorder noted   Psychiatric: alert, oriented to person, place, and time    Data reviewed    Previous medical records reviewed and summarized above in HPI.     Laboratory    I have reviewed old labs below:    Lab Results   Component Value Date    WBC 12.57 05/25/2020    HGB 13.4 (L) 05/25/2020    HCT 39.4 (L) 05/25/2020    MCV 89 05/25/2020     05/25/2020     05/25/2020    K 3.7 05/25/2020     05/25/2020    CALCIUM 9.0 05/25/2020    CO2 30 (H) 05/25/2020    BUN 13 05/25/2020    CREATININE 1.0 05/25/2020    ANIONGAP 7 (L) 05/25/2020    ESTGFRAFRICA >60 05/25/2020    EGFRNONAA >60 05/25/2020    PROT 6.8 05/25/2020    ALBUMIN 3.7 05/25/2020    BILITOT 0.2 05/25/2020    ALKPHOS 74 05/25/2020    ALT 22 05/25/2020    AST 21 05/25/2020    CHOL 263 (H) 11/15/2016    TRIG 222 (H) 11/15/2016    HDL 49 11/15/2016    LDLCALC 169.6 (H) 11/15/2016    TSH 2.050 11/15/2016    HGBA1C 5.4 11/15/2016       Assessment/Plan    Daniel Behlar is a 35 y.o.  male who presents to clinic with:    1. RUQ pain    2. Biliary colic    3. Chronic GERD         Diagnosis plan remarks     RUQ pain  Patient has severe right upper quadrant pain and subjective fevers and needs imaging to assess for block stone versus cholecystitis.  Instructed him to go to the taking imaging blood work and better pain control.  It is concerning that he reports his abdominal pain worsens with bumps or sleep movement.  Advised med visit wrist and he reported he will go to emergency room for emergent imaging for assessment.      Medication Monitoring: In today's visit, monitoring for drug toxicity was accomplished. Proper use of medications was discussed.     Counseling: Counseling included discussion regarding imaging findings, diagnosis, possibilities, treatment options, medications, risks, and benefits. He had many questions regarding the options and long-term effects. All questions were answered. He expressed understanding after counseling regarding the diagnosis and recommendations. He was capable and demonstrated competence with understanding of these options. Shared decision making was performed resulting in him choosing the current treatment plan.     He was counseled about the importance of healthy dietary habits as well as routine physical activity and exercise for better health outcomes. I also discussed the importance of cancer screening.     I also discussed the importance of close follow up to discuss labs, change or modify his medications if needed, monitor side effects, and further evaluation of medical problems.     Additional workup planned: see labs ordered below.    See below for labs and meds ordered with associated diagnosis    1. RUQ pain    2. Biliary colic    3. Chronic GERD       Medication List with Changes/Refills   Current Medications    DICYCLOMINE (BENTYL) 20 MG TABLET    Take 1 tablet (20 mg total) by mouth 2 (two) times daily. for 7 days    FLUOXETINE 20 MG CAPSULE     "Take 20 mg by mouth once daily.    MIRTAZAPINE (REMERON) 30 MG TABLET        OMEPRAZOLE (PRILOSEC) 20 MG CAPSULE        ONDANSETRON (ZOFRAN) 4 MG TABLET    Take 1 tablet (4 mg total) by mouth every 6 (six) hours. for 3 days    OXYCODONE-ACETAMINOPHEN (PERCOCET) 5-325 MG PER TABLET    Take 1 tablet by mouth every 6 (six) hours as needed for Pain (severe pain).    PANTOPRAZOLE (PROTONIX) 40 MG TABLET    Take 1 tablet (40 mg total) by mouth once daily.    QUETIAPINE (SEROQUEL) 200 MG TAB    Take 1 tablet (200 mg total) by mouth every evening.    VENLAFAXINE (EFFEXOR-XR) 75 MG 24 HR CAPSULE       Discontinued Medications    FLUOXETINE 20 MG TABLET    Take 1 tablet (20 mg total) by mouth once daily.    QUETIAPINE (SEROQUEL) 100 MG TAB         Modified Medications    No medications on file       Follow up in about 3 months (around 8/27/2020). for further workup and reassessment if labs and tests obtained are stable or sooner as needed. He was instructed to call the clinic or go to the emergency department if his symptoms do not improve, worsens, or if new symptoms develop. Shared decision making occurred and he verbalized understanding in agreement with this plan.     Documentation entered by me for this encounter may have been done in part using speech-recognition technology. Although I have made an effort to ensure accuracy, "sound like" errors may exist and should be interpreted in context.     Rakan Teran MD  05/27/2020   "

## 2020-05-27 NOTE — LETTER
May 29, 2020         [address] [place] [address]  Phone: [phone]  Fax: [phone]       Patient: Daniel Daniel Behlar   YOB: 1985  Date of Visit: 05/29/2020    To Whom It May Concern:     Daniel Behlar  was at [facility] from 05/23/2020 to 05/29/2020He  may return to work Monday June 1,2020 with the following restriction:         No heavy lifting greater than 10 pounds.            If you have any questions or concerns, or if I can be of further assistance, please do not hesitate to contact me.    Sincerely,    Luz Oneill RN  For Dr. Eduardo Bolden

## 2020-05-27 NOTE — ED PROVIDER NOTES
Encounter Date: 2020       History     Chief Complaint   Patient presents with    Abdominal Pain     RUQ abd pain denies radiation, has problem with gallbladder, sent here by for workup. symptoms since friday night.     The patient is a 35-year-old male.  He has no diagnosed chronic medical conditions.  He presents right upper quadrant abdominal pain that began five days ago.  The pain has waxed and waning intensity.  It is severe at its worst.  He describes it as a cramping sensation.  He feels like he is bloated.  He had subjective fever and chills two nights ago.  He has had episodes of nausea but has not vomited.  He denies diarrhea and constipation.  He was evaluated here in the ED two days ago and was discharged home on Bentyl, Percocet 5-325, and Zofran.  His symptoms are mildly improved with these medications.  He followed up with primary care today and was sent to the ED for evaluation because his symptoms have persisted.  He has been having severe discomfort with driving because bumps in the road way cause exacerbations of pain.  He has been sober for several months.  He smokes cigarettes.    The history is provided by the patient and medical records. No  was used.     Review of patient's allergies indicates:  No Known Allergies  No past medical history on file.  Past Surgical History:   Procedure Laterality Date    ESOPHAGOGASTRODUODENOSCOPY       Family History   Problem Relation Age of Onset    No Known Problems Mother     No Known Problems Father     No Known Problems Sister     No Known Problems Brother      Social History     Tobacco Use    Smoking status: Former Smoker     Packs/day: 0.25     Types: Cigarettes     Last attempt to quit: 2018     Years since quittin.0    Smokeless tobacco: Never Used    Tobacco comment: quit smoking May 21, 2015, the day before first baby born   Substance Use Topics    Alcohol use: Yes     Frequency: Never     Drinks per  session: Patient refused     Binge frequency: Never    Drug use: Yes     Frequency: 2.0 times per week     Types: Marijuana     Review of Systems   Constitutional: Positive for chills and fever (Subjective).   HENT: Negative for sore throat and trouble swallowing.    Respiratory: Negative for shortness of breath.    Cardiovascular: Negative for chest pain.   Gastrointestinal: Positive for abdominal pain and nausea. Negative for constipation, diarrhea and vomiting.   Genitourinary: Negative for difficulty urinating, dysuria and hematuria.       Physical Exam     Initial Vitals [05/27/20 1628]   BP Pulse Resp Temp SpO2   (!) 154/78 102 18 98.2 °F (36.8 °C) 99 %      MAP       --         Physical Exam    Nursing note and vitals reviewed.  Constitutional: He appears well-developed and well-nourished. He is not diaphoretic. No distress.   HENT:   Mouth/Throat: Oropharynx is clear and moist.   Eyes: Conjunctivae are normal. No scleral icterus.   Cardiovascular: Normal rate, regular rhythm and normal heart sounds. Exam reveals no gallop and no friction rub.    No murmur heard.  Pulmonary/Chest: Breath sounds normal. No respiratory distress. He has no wheezes. He has no rhonchi. He has no rales.   Abdominal: Soft. Bowel sounds are normal. There is tenderness in the right upper quadrant. There is guarding. There is no rigidity, no rebound and no CVA tenderness.   Neurological: He is alert and oriented to person, place, and time. GCS eye subscore is 4. GCS verbal subscore is 5. GCS motor subscore is 6.         ED Course   Procedures  Labs Reviewed   CBC W/ AUTO DIFFERENTIAL - Abnormal; Notable for the following components:       Result Value    MPV 8.9 (*)     Immature Granulocytes 0.6 (*)     Immature Grans (Abs) 0.05 (*)     Gran% 77.1 (*)     Lymph% 14.5 (*)     All other components within normal limits   COMPREHENSIVE METABOLIC PANEL - Abnormal; Notable for the following components:    Glucose 114 (*)     All other  components within normal limits   LIPASE          Imaging Results    None          Medical Decision Making:   History:   Old Medical Records: I decided to obtain old medical records.  Old Records Summarized: other records.  Clinical Tests:   Lab Tests: Ordered and Reviewed  Radiological Study: Ordered and Reviewed    Medical decision making:  This patient has undergone evaluation for several days of persistent right upper quadrant abdominal pain.  Differential diagnoses included cholelithiasis, cholecystitis, ascending cholangitis, pancreatitis, and other conditions.  He has cholelithiasis and cholecystitis on workup.  He is being admitted to general surgery for further management.                   ED Course as of May 27 1843   Wed May 27, 2020   1823 Discussed the patient's presentation, exam, lab, and imaging findings with Dr. Bolden who recommends admission.    [LP]      ED Course User Index  [LP] Ethan Perrin III, MD                Clinical Impression:       ICD-10-CM ICD-9-CM   1. Calculus of gallbladder with acute cholecystitis without obstruction K80.00 574.00         Disposition:   Disposition: Placed in Observation                        Ethan Perrin III, MD  05/27/20 1849

## 2020-05-28 ENCOUNTER — CLINICAL SUPPORT (OUTPATIENT)
Dept: SMOKING CESSATION | Facility: CLINIC | Age: 35
End: 2020-05-28

## 2020-05-28 ENCOUNTER — PATIENT MESSAGE (OUTPATIENT)
Dept: FAMILY MEDICINE | Facility: HOSPITAL | Age: 35
End: 2020-05-28

## 2020-05-28 ENCOUNTER — ANESTHESIA (OUTPATIENT)
Dept: SURGERY | Facility: HOSPITAL | Age: 35
End: 2020-05-28
Payer: MEDICAID

## 2020-05-28 DIAGNOSIS — F17.210 CIGARETTE SMOKER: Primary | ICD-10-CM

## 2020-05-28 PROCEDURE — 96376 TX/PRO/DX INJ SAME DRUG ADON: CPT

## 2020-05-28 PROCEDURE — S0020 INJECTION, BUPIVICAINE HYDRO: HCPCS | Performed by: STUDENT IN AN ORGANIZED HEALTH CARE EDUCATION/TRAINING PROGRAM

## 2020-05-28 PROCEDURE — 36000709 HC OR TIME LEV III EA ADD 15 MIN: Performed by: STUDENT IN AN ORGANIZED HEALTH CARE EDUCATION/TRAINING PROGRAM

## 2020-05-28 PROCEDURE — 63600175 PHARM REV CODE 636 W HCPCS: Performed by: ANESTHESIOLOGY

## 2020-05-28 PROCEDURE — 88304 PR  SURG PATH,LEVEL III: ICD-10-PCS | Mod: 26,,, | Performed by: PATHOLOGY

## 2020-05-28 PROCEDURE — 63600175 PHARM REV CODE 636 W HCPCS: Performed by: NURSE ANESTHETIST, CERTIFIED REGISTERED

## 2020-05-28 PROCEDURE — 88304 TISSUE EXAM BY PATHOLOGIST: CPT | Performed by: PATHOLOGY

## 2020-05-28 PROCEDURE — 00790 ANES IPER UPR ABD NOS: CPT | Performed by: STUDENT IN AN ORGANIZED HEALTH CARE EDUCATION/TRAINING PROGRAM

## 2020-05-28 PROCEDURE — 25000003 PHARM REV CODE 250: Performed by: NURSE ANESTHETIST, CERTIFIED REGISTERED

## 2020-05-28 PROCEDURE — 96361 HYDRATE IV INFUSION ADD-ON: CPT

## 2020-05-28 PROCEDURE — 99219 PR INITIAL OBSERVATION CARE,LEVL II: CPT | Mod: ,,, | Performed by: STUDENT IN AN ORGANIZED HEALTH CARE EDUCATION/TRAINING PROGRAM

## 2020-05-28 PROCEDURE — 63600175 PHARM REV CODE 636 W HCPCS: Performed by: STUDENT IN AN ORGANIZED HEALTH CARE EDUCATION/TRAINING PROGRAM

## 2020-05-28 PROCEDURE — 88304 TISSUE EXAM BY PATHOLOGIST: CPT | Mod: 26,,, | Performed by: PATHOLOGY

## 2020-05-28 PROCEDURE — 99999 PR PBB SHADOW E&M-EST. PATIENT-LVL I: ICD-10-PCS | Mod: PBBFAC,,,

## 2020-05-28 PROCEDURE — 37000009 HC ANESTHESIA EA ADD 15 MINS: Performed by: STUDENT IN AN ORGANIZED HEALTH CARE EDUCATION/TRAINING PROGRAM

## 2020-05-28 PROCEDURE — 47562 PR LAP,CHOLECYSTECTOMY: ICD-10-PCS | Mod: ,,, | Performed by: STUDENT IN AN ORGANIZED HEALTH CARE EDUCATION/TRAINING PROGRAM

## 2020-05-28 PROCEDURE — 27201423 OPTIME MED/SURG SUP & DEVICES STERILE SUPPLY: Performed by: STUDENT IN AN ORGANIZED HEALTH CARE EDUCATION/TRAINING PROGRAM

## 2020-05-28 PROCEDURE — 47562 LAPAROSCOPIC CHOLECYSTECTOMY: CPT | Mod: ,,, | Performed by: STUDENT IN AN ORGANIZED HEALTH CARE EDUCATION/TRAINING PROGRAM

## 2020-05-28 PROCEDURE — 37000008 HC ANESTHESIA 1ST 15 MINUTES: Performed by: STUDENT IN AN ORGANIZED HEALTH CARE EDUCATION/TRAINING PROGRAM

## 2020-05-28 PROCEDURE — 25000003 PHARM REV CODE 250: Performed by: STUDENT IN AN ORGANIZED HEALTH CARE EDUCATION/TRAINING PROGRAM

## 2020-05-28 PROCEDURE — 36000708 HC OR TIME LEV III 1ST 15 MIN: Performed by: STUDENT IN AN ORGANIZED HEALTH CARE EDUCATION/TRAINING PROGRAM

## 2020-05-28 PROCEDURE — 25000242 PHARM REV CODE 250 ALT 637 W/ HCPCS: Performed by: ANESTHESIOLOGY

## 2020-05-28 PROCEDURE — 99219 PR INITIAL OBSERVATION CARE,LEVL II: ICD-10-PCS | Mod: ,,, | Performed by: STUDENT IN AN ORGANIZED HEALTH CARE EDUCATION/TRAINING PROGRAM

## 2020-05-28 PROCEDURE — 99406 PT REFUSED TOBACCO CESSATION: ICD-10-PCS | Mod: S$GLB,,,

## 2020-05-28 PROCEDURE — 25000003 PHARM REV CODE 250: Performed by: ANESTHESIOLOGY

## 2020-05-28 PROCEDURE — G0378 HOSPITAL OBSERVATION PER HR: HCPCS

## 2020-05-28 PROCEDURE — 25000003 PHARM REV CODE 250: Performed by: EMERGENCY MEDICINE

## 2020-05-28 PROCEDURE — 94761 N-INVAS EAR/PLS OXIMETRY MLT: CPT | Mod: 59

## 2020-05-28 PROCEDURE — 71000033 HC RECOVERY, INTIAL HOUR: Performed by: STUDENT IN AN ORGANIZED HEALTH CARE EDUCATION/TRAINING PROGRAM

## 2020-05-28 PROCEDURE — 99999 PR PBB SHADOW E&M-EST. PATIENT-LVL I: CPT | Mod: PBBFAC,,,

## 2020-05-28 PROCEDURE — 99406 BEHAV CHNG SMOKING 3-10 MIN: CPT | Mod: S$GLB,,,

## 2020-05-28 RX ORDER — HYDROMORPHONE HYDROCHLORIDE 2 MG/ML
INJECTION, SOLUTION INTRAMUSCULAR; INTRAVENOUS; SUBCUTANEOUS
Status: DISCONTINUED | OUTPATIENT
Start: 2020-05-28 | End: 2020-05-28

## 2020-05-28 RX ORDER — ACETAMINOPHEN 10 MG/ML
INJECTION, SOLUTION INTRAVENOUS
Status: DISCONTINUED | OUTPATIENT
Start: 2020-05-28 | End: 2020-05-28

## 2020-05-28 RX ORDER — ALBUTEROL SULFATE 90 UG/1
AEROSOL, METERED RESPIRATORY (INHALATION)
Status: DISCONTINUED | OUTPATIENT
Start: 2020-05-28 | End: 2020-05-28

## 2020-05-28 RX ORDER — OXYCODONE AND ACETAMINOPHEN 5; 325 MG/1; MG/1
1 TABLET ORAL
Status: DISCONTINUED | OUTPATIENT
Start: 2020-05-28 | End: 2020-05-28

## 2020-05-28 RX ORDER — NEOSTIGMINE METHYLSULFATE 1 MG/ML
INJECTION, SOLUTION INTRAVENOUS
Status: DISCONTINUED | OUTPATIENT
Start: 2020-05-28 | End: 2020-05-28

## 2020-05-28 RX ORDER — ONDANSETRON HYDROCHLORIDE 2 MG/ML
INJECTION, SOLUTION INTRAMUSCULAR; INTRAVENOUS
Status: DISCONTINUED | OUTPATIENT
Start: 2020-05-28 | End: 2020-05-28

## 2020-05-28 RX ORDER — HYDROMORPHONE HYDROCHLORIDE 2 MG/ML
0.5 INJECTION, SOLUTION INTRAMUSCULAR; INTRAVENOUS; SUBCUTANEOUS EVERY 5 MIN PRN
Status: DISCONTINUED | OUTPATIENT
Start: 2020-05-28 | End: 2020-05-28

## 2020-05-28 RX ORDER — SODIUM CHLORIDE, SODIUM LACTATE, POTASSIUM CHLORIDE, CALCIUM CHLORIDE 600; 310; 30; 20 MG/100ML; MG/100ML; MG/100ML; MG/100ML
INJECTION, SOLUTION INTRAVENOUS CONTINUOUS PRN
Status: DISCONTINUED | OUTPATIENT
Start: 2020-05-28 | End: 2020-05-28

## 2020-05-28 RX ORDER — BUPIVACAINE HYDROCHLORIDE 5 MG/ML
INJECTION, SOLUTION EPIDURAL; INTRACAUDAL
Status: DISCONTINUED | OUTPATIENT
Start: 2020-05-28 | End: 2020-05-28 | Stop reason: HOSPADM

## 2020-05-28 RX ORDER — FENTANYL CITRATE 50 UG/ML
INJECTION, SOLUTION INTRAMUSCULAR; INTRAVENOUS
Status: DISCONTINUED | OUTPATIENT
Start: 2020-05-28 | End: 2020-05-28

## 2020-05-28 RX ORDER — DIPHENHYDRAMINE HYDROCHLORIDE 50 MG/ML
25 INJECTION INTRAMUSCULAR; INTRAVENOUS EVERY 6 HOURS PRN
Status: DISCONTINUED | OUTPATIENT
Start: 2020-05-28 | End: 2020-05-28

## 2020-05-28 RX ORDER — PROPOFOL 10 MG/ML
VIAL (ML) INTRAVENOUS
Status: DISCONTINUED | OUTPATIENT
Start: 2020-05-28 | End: 2020-05-28

## 2020-05-28 RX ORDER — SUCCINYLCHOLINE CHLORIDE 20 MG/ML
INJECTION INTRAMUSCULAR; INTRAVENOUS
Status: DISCONTINUED | OUTPATIENT
Start: 2020-05-28 | End: 2020-05-28

## 2020-05-28 RX ORDER — DEXAMETHASONE SODIUM PHOSPHATE 4 MG/ML
INJECTION, SOLUTION INTRA-ARTICULAR; INTRALESIONAL; INTRAMUSCULAR; INTRAVENOUS; SOFT TISSUE
Status: DISCONTINUED | OUTPATIENT
Start: 2020-05-28 | End: 2020-05-28

## 2020-05-28 RX ORDER — KETOROLAC TROMETHAMINE 30 MG/ML
INJECTION, SOLUTION INTRAMUSCULAR; INTRAVENOUS
Status: DISCONTINUED | OUTPATIENT
Start: 2020-05-28 | End: 2020-05-28

## 2020-05-28 RX ORDER — ROCURONIUM BROMIDE 10 MG/ML
INJECTION, SOLUTION INTRAVENOUS
Status: DISCONTINUED | OUTPATIENT
Start: 2020-05-28 | End: 2020-05-28

## 2020-05-28 RX ORDER — GLYCOPYRROLATE 0.2 MG/ML
INJECTION INTRAMUSCULAR; INTRAVENOUS
Status: DISCONTINUED | OUTPATIENT
Start: 2020-05-28 | End: 2020-05-28

## 2020-05-28 RX ORDER — LIDOCAINE HCL/PF 100 MG/5ML
SYRINGE (ML) INTRAVENOUS
Status: DISCONTINUED | OUTPATIENT
Start: 2020-05-28 | End: 2020-05-28

## 2020-05-28 RX ORDER — MIDAZOLAM HYDROCHLORIDE 1 MG/ML
INJECTION INTRAMUSCULAR; INTRAVENOUS
Status: DISCONTINUED | OUTPATIENT
Start: 2020-05-28 | End: 2020-05-28

## 2020-05-28 RX ORDER — HYDROMORPHONE HYDROCHLORIDE 2 MG/ML
0.2 INJECTION, SOLUTION INTRAMUSCULAR; INTRAVENOUS; SUBCUTANEOUS EVERY 5 MIN PRN
Status: DISCONTINUED | OUTPATIENT
Start: 2020-05-28 | End: 2020-05-28

## 2020-05-28 RX ADMIN — HYDROMORPHONE HYDROCHLORIDE 0.2 MG: 2 INJECTION INTRAMUSCULAR; INTRAVENOUS; SUBCUTANEOUS at 07:05

## 2020-05-28 RX ADMIN — PROPOFOL 150 MG: 10 INJECTION, EMULSION INTRAVENOUS at 06:05

## 2020-05-28 RX ADMIN — HYDROCODONE BITARTRATE AND ACETAMINOPHEN 1 TABLET: 5; 325 TABLET ORAL at 11:05

## 2020-05-28 RX ADMIN — HYDROMORPHONE HYDROCHLORIDE 0.5 MG: 2 INJECTION INTRAMUSCULAR; INTRAVENOUS; SUBCUTANEOUS at 07:05

## 2020-05-28 RX ADMIN — DEXAMETHASONE SODIUM PHOSPHATE 8 MG: 4 INJECTION, SOLUTION INTRA-ARTICULAR; INTRALESIONAL; INTRAMUSCULAR; INTRAVENOUS; SOFT TISSUE at 06:05

## 2020-05-28 RX ADMIN — GLYCOPYRROLATE 0.6 MG: 0.2 INJECTION, SOLUTION INTRAMUSCULAR; INTRAVENOUS at 07:05

## 2020-05-28 RX ADMIN — VENLAFAXINE HYDROCHLORIDE 75 MG: 75 CAPSULE, EXTENDED RELEASE ORAL at 09:05

## 2020-05-28 RX ADMIN — MORPHINE SULFATE 4 MG: 4 INJECTION INTRAVENOUS at 10:05

## 2020-05-28 RX ADMIN — ONDANSETRON 8 MG: 2 INJECTION, SOLUTION INTRAMUSCULAR; INTRAVENOUS at 06:05

## 2020-05-28 RX ADMIN — ROCURONIUM BROMIDE 5 MG: 10 INJECTION, SOLUTION INTRAVENOUS at 06:05

## 2020-05-28 RX ADMIN — SUCCINYLCHOLINE CHLORIDE 120 MG: 20 INJECTION, SOLUTION INTRAMUSCULAR; INTRAVENOUS at 06:05

## 2020-05-28 RX ADMIN — MORPHINE SULFATE 4 MG: 4 INJECTION INTRAVENOUS at 04:05

## 2020-05-28 RX ADMIN — PROMETHAZINE HYDROCHLORIDE 6.25 MG: 25 INJECTION INTRAMUSCULAR; INTRAVENOUS at 08:05

## 2020-05-28 RX ADMIN — FLUOXETINE 20 MG: 20 CAPSULE ORAL at 09:05

## 2020-05-28 RX ADMIN — SODIUM CHLORIDE, SODIUM LACTATE, POTASSIUM CHLORIDE, AND CALCIUM CHLORIDE: .6; .31; .03; .02 INJECTION, SOLUTION INTRAVENOUS at 09:05

## 2020-05-28 RX ADMIN — MIDAZOLAM HYDROCHLORIDE 2 MG: 1 INJECTION, SOLUTION INTRAMUSCULAR; INTRAVENOUS at 05:05

## 2020-05-28 RX ADMIN — PIPERACILLIN AND TAZOBACTAM 4.5 G: 4; .5 INJECTION, POWDER, LYOPHILIZED, FOR SOLUTION INTRAVENOUS; PARENTERAL at 10:05

## 2020-05-28 RX ADMIN — KETOROLAC TROMETHAMINE 15 MG: 30 INJECTION, SOLUTION INTRAMUSCULAR at 05:05

## 2020-05-28 RX ADMIN — KETOROLAC TROMETHAMINE 15 MG: 30 INJECTION, SOLUTION INTRAMUSCULAR at 12:05

## 2020-05-28 RX ADMIN — ACETAMINOPHEN 1000 MG: 10 INJECTION, SOLUTION INTRAVENOUS at 06:05

## 2020-05-28 RX ADMIN — HYDROMORPHONE HYDROCHLORIDE 0.5 MG: 2 INJECTION INTRAMUSCULAR; INTRAVENOUS; SUBCUTANEOUS at 08:05

## 2020-05-28 RX ADMIN — PIPERACILLIN AND TAZOBACTAM 4.5 G: 4; .5 INJECTION, POWDER, LYOPHILIZED, FOR SOLUTION INTRAVENOUS; PARENTERAL at 03:05

## 2020-05-28 RX ADMIN — MORPHINE SULFATE 4 MG: 4 INJECTION INTRAVENOUS at 12:05

## 2020-05-28 RX ADMIN — PIPERACILLIN AND TAZOBACTAM 4.5 G: 4; .5 INJECTION, POWDER, LYOPHILIZED, FOR SOLUTION INTRAVENOUS; PARENTERAL at 09:05

## 2020-05-28 RX ADMIN — KETOROLAC TROMETHAMINE 30 MG: 30 INJECTION, SOLUTION INTRAMUSCULAR; INTRAVENOUS at 07:05

## 2020-05-28 RX ADMIN — ALBUTEROL SULFATE 4 PUFF: 90 AEROSOL, METERED RESPIRATORY (INHALATION) at 07:05

## 2020-05-28 RX ADMIN — ALBUTEROL SULFATE 4 PUFF: 90 AEROSOL, METERED RESPIRATORY (INHALATION) at 06:05

## 2020-05-28 RX ADMIN — KETOROLAC TROMETHAMINE 15 MG: 30 INJECTION, SOLUTION INTRAMUSCULAR at 11:05

## 2020-05-28 RX ADMIN — SODIUM CHLORIDE, SODIUM LACTATE, POTASSIUM CHLORIDE, AND CALCIUM CHLORIDE: .6; .31; .03; .02 INJECTION, SOLUTION INTRAVENOUS at 05:05

## 2020-05-28 RX ADMIN — FENTANYL CITRATE 150 MCG: 50 INJECTION, SOLUTION INTRAMUSCULAR; INTRAVENOUS at 06:05

## 2020-05-28 RX ADMIN — QUETIAPINE 200 MG: 100 TABLET ORAL at 09:05

## 2020-05-28 RX ADMIN — ROCURONIUM BROMIDE 45 MG: 10 INJECTION, SOLUTION INTRAVENOUS at 06:05

## 2020-05-28 RX ADMIN — MORPHINE SULFATE 4 MG: 4 INJECTION INTRAVENOUS at 02:05

## 2020-05-28 RX ADMIN — DEXTROSE 2 G: 50 INJECTION, SOLUTION INTRAVENOUS at 06:05

## 2020-05-28 RX ADMIN — LIDOCAINE HYDROCHLORIDE 75 MG: 20 INJECTION, SOLUTION INTRAVENOUS at 06:05

## 2020-05-28 RX ADMIN — FENTANYL CITRATE 50 MCG: 50 INJECTION, SOLUTION INTRAMUSCULAR; INTRAVENOUS at 06:05

## 2020-05-28 RX ADMIN — NEOSTIGMINE METHYLSULFATE 5 MG: 1 INJECTION INTRAVENOUS at 07:05

## 2020-05-28 RX ADMIN — SODIUM CHLORIDE, SODIUM LACTATE, POTASSIUM CHLORIDE, AND CALCIUM CHLORIDE: .6; .31; .03; .02 INJECTION, SOLUTION INTRAVENOUS at 07:05

## 2020-05-28 NOTE — PLAN OF CARE
TN met with pt for d/c planning. Pt lives alone. Pt's mother will be pt's help at home upon d/c. Pt independent with ADLs. Pt is employed and able to drive self. Pt prefers bedside delivery for meds on d/c. Pt's mother to provide transportation on d/c. TN updated contact information on [employer]       05/28/20 1505   Discharge Assessment   Assessment Type Discharge Planning Assessment   Confirmed/corrected address and phone number on facesheet? Yes   Assessment information obtained from? Patient   Expected Length of Stay (days) 1   Communicated expected length of stay with patient/caregiver yes   Prior to hospitilization cognitive status: Alert/Oriented   Prior to hospitalization functional status: Independent   Current cognitive status: Alert/Oriented   Current Functional Status: Independent   Lives With alone   Able to Return to Prior Arrangements yes   Is patient able to care for self after discharge? Yes   Who are your caregiver(s) and their phone number(s)? Mary Behlar (mother) [phone]   Patient's perception of discharge disposition home or selfcare   Readmission Within the Last 30 Days no previous admission in last 30 days   Patient currently being followed by outpatient case management? No   Patient currently receives any other outside agency services? No   Equipment Currently Used at Home none   Do you have any problems affording any of your prescribed medications? No   Is the patient taking medications as prescribed? yes   Does the patient have transportation home? Yes   Transportation Anticipated family or friend will provide   Does the patient receive services at the [facility]? No   Discharge Plan A Home   Discharge Plan B Home with family   DME Needed Upon Discharge  none   Patient/Family in Agreement with Plan yes

## 2020-05-28 NOTE — PROGRESS NOTES
Smoking cessation education provided. Pt states that he started smoking at the age of 14 and he smokes approximately 1 pack of cigarettes per week. He has been cutting down but has been unable to quit.  Pt denies nicotine withdrawal symptoms. He states that he is ready to quit smoking and would like to try Chantix, which his twin brother used successfully to quit smoking.  Ambulatory referral to Smoking Cessaiton program following hospital discharge.

## 2020-05-28 NOTE — SUBJECTIVE & OBJECTIVE
No current facility-administered medications on file prior to encounter.      Current Outpatient Medications on File Prior to Encounter   Medication Sig    dicyclomine (BENTYL) 20 mg tablet Take 1 tablet (20 mg total) by mouth 2 (two) times daily. for 7 days    FLUoxetine 20 MG capsule Take 20 mg by mouth once daily.    mirtazapine (REMERON) 30 MG tablet     ondansetron (ZOFRAN) 4 MG tablet Take 1 tablet (4 mg total) by mouth every 6 (six) hours. for 3 days    oxyCODONE-acetaminophen (PERCOCET) 5-325 mg per tablet Take 1 tablet by mouth every 6 (six) hours as needed for Pain (severe pain).    pantoprazole (PROTONIX) 40 MG tablet Take 1 tablet (40 mg total) by mouth once daily.    QUEtiapine (SEROQUEL) 200 MG Tab Take 1 tablet (200 mg total) by mouth every evening.    venlafaxine (EFFEXOR-XR) 75 MG 24 hr capsule        Review of patient's allergies indicates:  No Known Allergies    Past Medical History:   Diagnosis Date    Bipolar 1 disorder     PTSD (post-traumatic stress disorder)      Past Surgical History:   Procedure Laterality Date    ESOPHAGOGASTRODUODENOSCOPY       Family History     Problem Relation (Age of Onset)    No Known Problems Mother, Father, Sister, Brother        Tobacco Use    Smoking status: Current Some Day Smoker     Packs/day: 0.25     Years: 21.00     Pack years: 5.25     Types: Cigarettes     Start date: 1999    Smokeless tobacco: Never Used    Tobacco comment: Ambulatory referral to Smoking Cessation program.    Substance and Sexual Activity    Alcohol use: Not Currently     Frequency: Never     Drinks per session: Patient refused     Binge frequency: Never    Drug use: Not Currently     Frequency: 2.0 times per week    Sexual activity: Yes     Partners: Female     Review of Systems   Constitutional: Negative for activity change, chills and fever.   Respiratory: Negative for chest tightness, shortness of breath and wheezing.    Cardiovascular: Negative for chest pain and  palpitations.   Gastrointestinal: Positive for abdominal pain (RUQ). Negative for abdominal distention, constipation, diarrhea, nausea and vomiting.   Genitourinary: Negative for difficulty urinating.   Skin: Negative for color change and wound.   Neurological: Negative for light-headedness.   Hematological: Does not bruise/bleed easily.     Objective:     Vital Signs (Most Recent):  Temp: 98.1 °F (36.7 °C) (05/28/20 1542)  Pulse: 64 (05/28/20 1542)  Resp: 17 (05/28/20 1542)  BP: 108/73 (05/28/20 1542)  SpO2: 96 % (05/28/20 1301) Vital Signs (24h Range):  Temp:  [97.4 °F (36.3 °C)-98.8 °F (37.1 °C)] 98.1 °F (36.7 °C)  Pulse:  [] 64  Resp:  [16-20] 17  SpO2:  [92 %-99 %] 96 %  BP: (101-154)/(58-84) 108/73     Weight: 96 kg (211 lb 10.3 oz)  Body mass index is 29.52 kg/m².    Physical Exam   Constitutional: He is oriented to person, place, and time. He appears well-developed and well-nourished.   Neck: No JVD present. No tracheal deviation present.   Cardiovascular: Normal rate and regular rhythm.   Pulmonary/Chest: Effort normal. No respiratory distress.   Abdominal: Soft. He exhibits no distension. There is tenderness (to deep palpation in RUQ. Negative Godoy's sign). There is no guarding.   Musculoskeletal: He exhibits no edema.   Neurological: He is alert and oriented to person, place, and time.   Skin: Skin is warm and dry.   Nursing note and vitals reviewed.      Significant Labs:  CBC:   Recent Labs   Lab 05/27/20  1715   WBC 8.80   RBC 4.81   HGB 14.2   HCT 42.6      MCV 89   MCH 29.5   MCHC 33.3     CMP:   Recent Labs   Lab 05/27/20  1715   *   CALCIUM 10.2   ALBUMIN 4.4   PROT 8.4      K 4.1   CO2 25      BUN 13   CREATININE 1.1   ALKPHOS 78   ALT 31   AST 22   BILITOT 0.4       Significant Diagnostics:  I have reviewed all pertinent imaging results/findings within the past 24 hours.     Abd US 5/27:     Cholelithiasis noting mild gallbladder wall thickening and mild  gallbladder wall hyperemia.  Despite negative sonographic Godoy sign, findings could reflect early changes of acute cholecystitis in the appropriate clinical setting.  Correlation is recommended.  HIDA scan can be considered for definitive diagnosis as warranted.

## 2020-05-28 NOTE — H&P
[facility]  General Surgery  History & Physical    Patient Name: Daniel Behlar  MRN: [id number]  Admission Date: 5/27/2020  Attending Physician: Eduardo Bolden MD   Primary Care Provider: Kike Banks MD    Patient information was obtained from patient, past medical records and ER records.     Subjective:     Chief Complaint/Reason for Admission: Cholelithiasis    History of Present Illness: Patient is a 34yo male that presented to the ED on 5/28 with RUQ pain. He had been seen on 5/25 for the same problem and was discharged on a bowel regimen. On re-presentation on 5/28, abd US demonstrated cholelithiasis with mild gallbladder wall thickening and mild gallbladder wall hyperemia. Suggestive of early appendicitis. No fever or chills. No nausea or vomiting. No leukocytosis. Vital signs stable.     No past surgical history.  No known allergies.    No current facility-administered medications on file prior to encounter.      Current Outpatient Medications on File Prior to Encounter   Medication Sig    dicyclomine (BENTYL) 20 mg tablet Take 1 tablet (20 mg total) by mouth 2 (two) times daily. for 7 days    FLUoxetine 20 MG capsule Take 20 mg by mouth once daily.    mirtazapine (REMERON) 30 MG tablet     ondansetron (ZOFRAN) 4 MG tablet Take 1 tablet (4 mg total) by mouth every 6 (six) hours. for 3 days    oxyCODONE-acetaminophen (PERCOCET) 5-325 mg per tablet Take 1 tablet by mouth every 6 (six) hours as needed for Pain (severe pain).    pantoprazole (PROTONIX) 40 MG tablet Take 1 tablet (40 mg total) by mouth once daily.    QUEtiapine (SEROQUEL) 200 MG Tab Take 1 tablet (200 mg total) by mouth every evening.    venlafaxine (EFFEXOR-XR) 75 MG 24 hr capsule        Review of patient's allergies indicates:  No Known Allergies    Past Medical History:   Diagnosis Date    Bipolar 1 disorder     PTSD (post-traumatic stress disorder)      Past Surgical History:   Procedure Laterality Date     ESOPHAGOGASTRODUODENOSCOPY       Family History     Problem Relation (Age of Onset)    No Known Problems Mother, Father, Sister, Brother        Tobacco Use    Smoking status: Current Some Day Smoker     Packs/day: 0.25     Years: 21.00     Pack years: 5.25     Types: Cigarettes     Start date: 1999    Smokeless tobacco: Never Used    Tobacco comment: Ambulatory referral to Smoking Cessation program.    Substance and Sexual Activity    Alcohol use: Not Currently     Frequency: Never     Drinks per session: Patient refused     Binge frequency: Never    Drug use: Not Currently     Frequency: 2.0 times per week    Sexual activity: Yes     Partners: Female     Review of Systems   Constitutional: Negative for activity change, chills and fever.   Respiratory: Negative for chest tightness, shortness of breath and wheezing.    Cardiovascular: Negative for chest pain and palpitations.   Gastrointestinal: Positive for abdominal pain (RUQ). Negative for abdominal distention, constipation, diarrhea, nausea and vomiting.   Genitourinary: Negative for difficulty urinating.   Skin: Negative for color change and wound.   Neurological: Negative for light-headedness.   Hematological: Does not bruise/bleed easily.     Objective:     Vital Signs (Most Recent):  Temp: 98.1 °F (36.7 °C) (05/28/20 1542)  Pulse: 64 (05/28/20 1542)  Resp: 17 (05/28/20 1542)  BP: 108/73 (05/28/20 1542)  SpO2: 96 % (05/28/20 1301) Vital Signs (24h Range):  Temp:  [97.4 °F (36.3 °C)-98.8 °F (37.1 °C)] 98.1 °F (36.7 °C)  Pulse:  [] 64  Resp:  [16-20] 17  SpO2:  [92 %-99 %] 96 %  BP: (101-154)/(58-84) 108/73     Weight: 96 kg (211 lb 10.3 oz)  Body mass index is 29.52 kg/m².    Physical Exam   Constitutional: He is oriented to person, place, and time. He appears well-developed and well-nourished.   Neck: No JVD present. No tracheal deviation present.   Cardiovascular: Normal rate and regular rhythm.   Pulmonary/Chest: Effort normal. No respiratory  distress.   Abdominal: Soft. He exhibits no distension. There is tenderness (to deep palpation in RUQ. Negative Godoy's sign). There is no guarding.   Musculoskeletal: He exhibits no edema.   Neurological: He is alert and oriented to person, place, and time.   Skin: Skin is warm and dry.   Nursing note and vitals reviewed.      Significant Labs:  CBC:   Recent Labs   Lab 05/27/20  1715   WBC 8.80   RBC 4.81   HGB 14.2   HCT 42.6      MCV 89   MCH 29.5   MCHC 33.3     CMP:   Recent Labs   Lab 05/27/20  1715   *   CALCIUM 10.2   ALBUMIN 4.4   PROT 8.4      K 4.1   CO2 25      BUN 13   CREATININE 1.1   ALKPHOS 78   ALT 31   AST 22   BILITOT 0.4       Significant Diagnostics:  I have reviewed all pertinent imaging results/findings within the past 24 hours.     Abd US 5/27:     Cholelithiasis noting mild gallbladder wall thickening and mild gallbladder wall hyperemia.  Despite negative sonographic Godoy sign, findings could reflect early changes of acute cholecystitis in the appropriate clinical setting.  Correlation is recommended.  HIDA scan can be considered for definitive diagnosis as warranted.        Assessment/Plan:     * Calculus of gallbladder with acute cholecystitis without obstruction  Patient is a 34yo male with cholelithiasis and suspected early acute cholecystitis. Normal LFT's and bilirubin, no evidence of biliary obstruction.    - Admit to general surgery, observation  - NPO, IVF  - Abx: zosyn  - PRN pain and nausea control  - To OR today for lap mag  - Consent obtained, all questions answered    Dispo: plan for discharge to home this evening vs tomorrow morning after surgery      VTE Risk Mitigation (From admission, onward)         Ordered     IP VTE HIGH RISK PATIENT  Once      05/27/20 2227     Place SUKH hose  Until discontinued      05/27/20 2227     Place sequential compression device  Until discontinued      05/27/20 1849                Kalli Chen MD  General  Surgery  [facility]

## 2020-05-28 NOTE — ED TRIAGE NOTES
Pt. Care assumed, pt. Is awake, alert and oriented. Skin is PWD. Pt.c/o right upper abdominal pain that increases with movement. Pt. States for the past 8 years he has suffered with gallbladder issues. Pt. Updated on the plan of care and will continue to monitor. Bed in the low position, side rails elevated x 2 and call light at the bedside.

## 2020-05-28 NOTE — ANESTHESIA PREPROCEDURE EVALUATION
05/27/2020  Daniel Behlar is a 35 y.o., male with a PMH of GERD, Substance abuse, Depression, and Cholecystitis here for lap mag under GETA    Patient Active Problem List   Diagnosis    Mucocele of lower lip    Chronic GERD    Chronic bilateral low back pain with right-sided sciatica    Depression    Cocaine abuse    Alcohol abuse    Calculus of gallbladder with acute cholecystitis without obstruction       No past medical history on file.  Past Surgical History:   Procedure Laterality Date    ESOPHAGOGASTRODUODENOSCOPY       Review of patient's allergies indicates:  No Known Allergies      Lab Results   Component Value Date    WBC 8.80 05/27/2020    HGB 14.2 05/27/2020    HCT 42.6 05/27/2020     05/27/2020    CHOL 263 (H) 11/15/2016    TRIG 222 (H) 11/15/2016    HDL 49 11/15/2016    ALT 31 05/27/2020    AST 22 05/27/2020     05/27/2020    K 4.1 05/27/2020     05/27/2020    CREATININE 1.1 05/27/2020    BUN 13 05/27/2020    CO2 25 05/27/2020    TSH 2.050 11/15/2016    HGBA1C 5.4 11/15/2016         Anesthesia Evaluation    I have reviewed the Patient Summary Reports.    I have reviewed the Nursing Notes.    I have reviewed the Medications.     Review of Systems  Hematology/Oncology:  Hematology Normal   Oncology Normal     EENT/Dental:EENT/Dental Normal   Cardiovascular:  Cardiovascular Normal Exercise tolerance: good     Pulmonary:  Pulmonary Normal    Renal/:  Renal/ Normal     Hepatic/GI:   GERD obesity   Musculoskeletal:  Musculoskeletal Normal    Neurological:  Neurology Normal    Endocrine:  Endocrine Normal    Psych:   Psychiatric History depression          Physical Exam  General:  Well nourished    Airway/Jaw/Neck:  Airway Findings: Mouth Opening: Normal Tongue: Normal  General Airway Assessment: Adult  Mallampati: II  TM Distance: Normal, at least 6 cm  Jaw/Neck  Findings:  Neck ROM: Normal ROM  Neck Findings: Normal     Dental:  Dental Findings: In tact   Chest/Lungs:  Chest/Lungs Findings: Normal Respiratory Rate     Heart/Vascular:  Heart Findings: Rate: Normal  Rhythm: Regular Rhythm       Skin:  Skin Findings:  Lower Lip mucocele   Mental Status:  Mental Status Findings:  Cooperative, Alert and Oriented         Anesthesia Plan  Type of Anesthesia, risks & benefits discussed:  Anesthesia Type:  general  Patient's Preference:   Intra-op Monitoring Plan: standard ASA monitors  Intra-op Monitoring Plan Comments:   Post Op Pain Control Plan: per primary service following discharge from PACU and multimodal analgesia  Post Op Pain Control Plan Comments:   Induction:   IV  Beta Blocker:  Patient is not currently on a Beta-Blocker (No further documentation required).       Informed Consent: Patient understands risks and agrees with Anesthesia plan.  Questions answered. Anesthesia consent signed with patient.  ASA Score: 2     Day of Surgery Review of History & Physical: I have interviewed and examined the patient. I have reviewed the patient's H&P dated:    H&P update referred to the surgeon.         Ready For Surgery From Anesthesia Perspective.

## 2020-05-28 NOTE — PLAN OF CARE
Pt AAO x4.  Complaints of pain throughout shift.  Pt. Awaiting procedure; nurse called down to surgery to get updated status and surgery nurse stated there were 2 cases ahead; pt notified and verbalized understanding.

## 2020-05-28 NOTE — ASSESSMENT & PLAN NOTE
Patient is a 36yo male with cholelithiasis and suspected early acute cholecystitis. Normal LFT's and bilirubin, no evidence of biliary obstruction.    - Admit to general surgery, observation  - NPO, IVF  - Abx: zosyn  - PRN pain and nausea control  - To OR today for lap mag  - Consent obtained, all questions answered    Dispo: plan for discharge to home this evening vs tomorrow morning after surgery

## 2020-05-28 NOTE — PLAN OF CARE
Pt AAOx4. Complaints of abdominal pain controlled by PRN pain meds. NPO since midnight for planned lap mag today. Ambulated independently in room. Safety maintained. Urinal provided. Bed locked in lowest position and call bell within reach. Will continue to monitor.

## 2020-05-28 NOTE — HPI
Patient is a 34yo male that presented to the ED on 5/28 with RUQ pain. He had been seen on 5/25 for the same problem and was discharged on a bowel regimen. On re-presentation on 5/28, abd US demonstrated cholelithiasis with mild gallbladder wall thickening and mild gallbladder wall hyperemia. Suggestive of early appendicitis. No fever or chills. No nausea or vomiting. No leukocytosis. Vital signs stable.     No past surgical history.  No known allergies.   35 35

## 2020-05-29 VITALS
SYSTOLIC BLOOD PRESSURE: 101 MMHG | HEART RATE: 73 BPM | TEMPERATURE: 98 F | OXYGEN SATURATION: 96 % | HEIGHT: 71 IN | RESPIRATION RATE: 18 BRPM | WEIGHT: 224.88 LBS | DIASTOLIC BLOOD PRESSURE: 58 MMHG | BODY MASS INDEX: 31.48 KG/M2

## 2020-05-29 PROBLEM — K80.00 CALCULUS OF GALLBLADDER WITH ACUTE CHOLECYSTITIS WITHOUT OBSTRUCTION: Status: RESOLVED | Noted: 2020-05-27 | Resolved: 2020-05-29

## 2020-05-29 PROCEDURE — 96376 TX/PRO/DX INJ SAME DRUG ADON: CPT

## 2020-05-29 PROCEDURE — 25000003 PHARM REV CODE 250: Performed by: STUDENT IN AN ORGANIZED HEALTH CARE EDUCATION/TRAINING PROGRAM

## 2020-05-29 PROCEDURE — G0378 HOSPITAL OBSERVATION PER HR: HCPCS

## 2020-05-29 PROCEDURE — 94761 N-INVAS EAR/PLS OXIMETRY MLT: CPT

## 2020-05-29 PROCEDURE — 63600175 PHARM REV CODE 636 W HCPCS: Performed by: STUDENT IN AN ORGANIZED HEALTH CARE EDUCATION/TRAINING PROGRAM

## 2020-05-29 RX ORDER — HYDROCODONE BITARTRATE AND ACETAMINOPHEN 5; 325 MG/1; MG/1
1 TABLET ORAL EVERY 8 HOURS PRN
Qty: 20 TABLET | Refills: 0 | Status: SHIPPED | OUTPATIENT
Start: 2020-05-29 | End: 2020-06-01 | Stop reason: ALTCHOICE

## 2020-05-29 RX ORDER — OXYCODONE AND ACETAMINOPHEN 5; 325 MG/1; MG/1
1 TABLET ORAL EVERY 6 HOURS PRN
Qty: 12 TABLET | Refills: 0 | Status: SHIPPED | OUTPATIENT
Start: 2020-05-29 | End: 2020-06-01 | Stop reason: SDUPTHER

## 2020-05-29 RX ORDER — HYDROCODONE BITARTRATE AND ACETAMINOPHEN 5; 325 MG/1; MG/1
1 TABLET ORAL EVERY 8 HOURS PRN
Qty: 20 TABLET | Refills: 0 | Status: SHIPPED | OUTPATIENT
Start: 2020-05-29 | End: 2020-05-29

## 2020-05-29 RX ADMIN — MORPHINE SULFATE 4 MG: 4 INJECTION INTRAVENOUS at 09:05

## 2020-05-29 RX ADMIN — HYDROCODONE BITARTRATE AND ACETAMINOPHEN 1 TABLET: 5; 325 TABLET ORAL at 06:05

## 2020-05-29 RX ADMIN — PIPERACILLIN AND TAZOBACTAM 4.5 G: 4; .5 INJECTION, POWDER, LYOPHILIZED, FOR SOLUTION INTRAVENOUS; PARENTERAL at 05:05

## 2020-05-29 RX ADMIN — KETOROLAC TROMETHAMINE 15 MG: 30 INJECTION, SOLUTION INTRAMUSCULAR at 06:05

## 2020-05-29 RX ADMIN — MORPHINE SULFATE 4 MG: 4 INJECTION INTRAVENOUS at 12:05

## 2020-05-29 NOTE — OP NOTE
DATE OF PROCEDURE:  05/28/2020    PREOPERATIVE DIAGNOSIS:  Acute cholecystitis.    POSTOPERATIVE DIAGNOSIS:  Acute cholecystitis    PROCEDURE:  Laparoscopic cholecystectomy.    SURGEON:  Eduardo Bolden M.D.    ASSISTANT:  Kalli Chen PGY2    ANESTHESIA:  General endotracheal.    PREP:  Chlorhexidine.    SPECIMEN:  Gallbladder.    ESTIMATED BLOOD LOSS:  Minimal.    INDICATIONS:  The patient is a 35 y.o. male   who presents to ED with   Acute cholecystitis.  The patient was counseled on his options for   treatment and desired to proceed with surgical intervention.  The risks of the   procedure were described to the patient including bleeding, infection, pain,   scarring, wound complications, injury to local structures including the liver,   intestine or bile duct retained common duct stone and potential need for further   surgery.  The patient demonstrated understanding of these risks and a consent   form was obtained.    PROCEDURE:  The patient was identified in the Preoperative Unit and taken back   to the Operating Room and laid supine on the operating room table.  IV   antibiotics were administered prior to the induction of general anesthesia.    General anesthesia was induced without complication.  The patient was then   prepped and draped in standard sterile fashion manner.  A timeout procedure was   performed in accordance of hospital protocol.      A 5 mm incision was made in the   supraumbilical location using a #15 blade scalpel.  A 5mm optical trocar was used to enter the abdomen.  Once we confirmed an   intra-abdominal presence, CO2 insufflation was initiated.  A camera was inserted and the abdomen   was inspected.  There did not appear to be any abnormalities within the   abdomen.  At this point, an 11 mm subxiphoid trocar and two 5-mm right subcostal   trocars were then placed under direct visualization.  The gallbladder was   grasped and elevated into the right upper quadrant over the liver.  The  gallbladder was somewhat inflamed. The   peritoneum was gently stripped inferiorly until the infundibulum and cystic   structures were visualized.  Gentle dissection of the cystic duct and artery   were skeletonized and the critical view was obtained.  At this point, the cystic   duct and artery were then clipped twice proximally, once distally and then   divided.  The gallbladder was then removed from the liver bed using Bovie   cautery.  There was some bleeding from the liver bed that was controlled with surgicel and cautery. Once this was completed, it was put into an EndoCatch bag and then   removed through the subxiphoid port.      The clips were visualized and appeared   intact.  The liver bed was hemostatic.  The right upper quadrant was gently   irrigated and fluid was evacuated.  At this point, the ports were removed under   direct visualization and CO2 gas was evacuated.  The subxiphoid fascia was closed   using 0 Vicryl suture in a figure-of-eight fashion.  The skin incisions were   closed using 5-0 Monocryl suture in an interrupted fashion.  Dry sterile   dressings were applied.  The patient was awakened from anesthesia without   complication and returned to the Postop Recovery in stable condition.  At the   end of the case, sponge and needle counts were correct on 2 occasions.  I was   present and scrubbed throughout the entirety of the case.    COMPLICATIONS:  None.    CONDITION:  Stable.

## 2020-05-29 NOTE — PLAN OF CARE
Discharge order noted. No HH or DME noted.    Discharge rounds on patient. Discussed followup appointments, blue discharge folder, discharge nurse will go over home medications and reasons for medications and final discharge instructions. All patient/caregiver questions answered. Patient verbalized understanding.      Future Appointments   Date Time Provider Department Center   6/4/2020  3:20 PM Kike Banks MD [facility]   6/15/2020 10:00 AM Eduardo Bolden MD [facility]        05/29/20 1025   Final Note   Assessment Type Final Discharge Note   Anticipated Discharge Disposition Home   What phone number can be called within the next 1-3 days to see how you are doing after discharge? [phone]   Hospital Follow Up  Appt(s) scheduled? Yes   Discharge plans and expectations educations in teach back method with documentation complete? Yes   Right Care Referral Info   Post Acute Recommendation No Care

## 2020-05-29 NOTE — PLAN OF CARE
Patient medicated for pain with PRN medications. Fluids infusing overnight. Antibiotics, IV given as ordered. Remains free from falls, bed alarm in use.

## 2020-05-29 NOTE — PLAN OF CARE
Patient has received discharge instructions. Prescriptions to be delivered at bedside . Instructions reviewed with pt using teachback method. All questions answered to pt satisfaction. IV access  removed per floor nurse. Transport will requested for discharge once ride is on campus and medications delivered.  Pt has requested a return to work note.  Physician and bedside nurse notified

## 2020-05-29 NOTE — ANESTHESIA POSTPROCEDURE EVALUATION
Anesthesia Post Evaluation    Patient: Daniel Behlar    Procedure(s) Performed: Procedure(s) (LRB):  CHOLECYSTECTOMY, LAPAROSCOPIC (N/A)    Final Anesthesia Type: general    Patient location during evaluation: PACU  Patient participation: Yes- Able to Participate  Level of consciousness: awake and alert  Post-procedure vital signs: reviewed and stable  Pain management: adequate  Airway patency: patent    PONV status at discharge: No PONV  Anesthetic complications: no      Cardiovascular status: blood pressure returned to baseline  Respiratory status: unassisted  Hydration status: euvolemic  Follow-up not needed.          Vitals Value Taken Time   /66 5/28/2020  9:23 PM   Temp 36.7 °C (98.1 °F) 5/28/2020  9:23 PM   Pulse 73 5/28/2020  9:23 PM   Resp 18 5/28/2020  9:23 PM   SpO2 92 % 5/28/2020  9:27 PM         Event Time     Out of Recovery 05/28/2020 20:31:48          Pain/Sheryl Score: Pain Rating Prior to Med Admin: 5 (5/28/2020  8:02 PM)  Sheryl Score: 8 (5/28/2020  8:30 PM)

## 2020-05-29 NOTE — DISCHARGE SUMMARY
[facility]  General Surgery  Discharge Summary      Patient Name: Daniel Behlar  MRN: [id number]  Admission Date: 5/27/2020  Hospital Length of Stay: 0 days  Discharge Date and Time:  05/29/2020 9:53 AM  Attending Physician: Eduardo Bolden MD   Discharging Provider: Kalli Chen MD  Primary Care Provider: Kike Banks MD     HPI: Patient is a 34yo male that presented to the ED on 5/28 with RUQ pain. He had been seen on 5/25 for the same problem and was discharged on a bowel regimen. On re-presentation on 5/28, abd US demonstrated cholelithiasis with mild gallbladder wall thickening and mild gallbladder wall hyperemia. Suggestive of early appendicitis. No fever or chills. No nausea or vomiting. No leukocytosis. Vital signs stable.      No past surgical history.  No known allergies.    Procedure(s) (LRB):  CHOLECYSTECTOMY, LAPAROSCOPIC (N/A)     Hospital Course: Admitted 5/27/20. Taken for lap cholecystectomy on 5/28. Surgery was uncomplicated, some oozing from cystic plate/liver bed controlled by cautery and and surgicel. Hemodynamically stable overnight. Tolerated a regular diet. Pain well controlled. Ready for discharge to home.     Physical Exam   Constitutional: He is oriented to person, place, and time. He appears well-developed and well-nourished.   Neck: No JVD present. No tracheal deviation present.   Cardiovascular: Normal rate and regular rhythm.   Pulmonary/Chest: Effort normal. No respiratory distress.   Abdominal: Soft. He exhibits no distension. There is tenderness (appropriate postop tenderness around incision sites). There is no guarding.   Port site incisions are clean, dry, and intact with overlying dermabond   Musculoskeletal: He exhibits no edema.   Neurological: He is alert and oriented to person, place, and time.   Skin: Skin is warm and dry.   Nursing note and vitals reviewed.        Consults: none    Pending Diagnostic Studies:     Procedure Component Value Units  Date/Time    Specimen to Pathology, Surgery General Surgery [[id number]] Collected:  05/28/20 1913    Order Status:  Sent Lab Status:  In process Updated:  05/28/20 1913        Final Active Diagnoses:    Diagnosis Date Noted POA    PRINCIPAL PROBLEM:  Calculus of gallbladder with acute cholecystitis without obstruction [K80.00] 05/27/2020 Yes      Problems Resolved During this Admission:      Discharged Condition: good    Disposition: Home or Self Care    Follow Up:  Follow-up Information     Eduardo Bolden MD In 2 weeks.    Specialties:  Surgery, General Surgery  Why:  post op lap cholecystectomy  Contact information:  [address]  [address]  [place] [address]  [phone]                 Patient Instructions:      Diet Adult Regular     No driving until:   Order Comments: While taking narcotic pain medications.     Notify your health care provider if you experience any of the following:  temperature >100.4     Notify your health care provider if you experience any of the following:  persistent nausea and vomiting or diarrhea     Notify your health care provider if you experience any of the following:  severe uncontrolled pain     Notify your health care provider if you experience any of the following:  redness, tenderness, or signs of infection (pain, swelling, redness, odor or green/yellow discharge around incision site)     No dressing needed     Activity as tolerated   Order Comments: - Ok to shower. No submersion of surgical wounds (bath, pool, hot tub, etc) for two weeks.  - No heavy lifting greater than 10 pounds.     Medications:  Reconciled Home Medications:      Medication List      CHANGE how you take these medications    FLUoxetine 20 MG capsule  Take 20 mg by mouth once daily.  What changed:  Another medication with the same name was removed. Continue taking this medication, and follow the directions you see here.        CONTINUE taking these medications    dicyclomine 20 mg tablet  Commonly  known as:  BENTYL  Take 1 tablet (20 mg total) by mouth 2 (two) times daily. for 7 days     mirtazapine 30 MG tablet  Commonly known as:  REMERON     oxyCODONE-acetaminophen 5-325 mg per tablet  Commonly known as:  PERCOCET  Take 1 tablet by mouth every 6 (six) hours as needed for Pain (severe pain).     pantoprazole 40 MG tablet  Commonly known as:  PROTONIX  Take 1 tablet (40 mg total) by mouth once daily.     QUEtiapine 200 MG Tab  Commonly known as:  SEROQUEL  Take 1 tablet (200 mg total) by mouth every evening.     venlafaxine 75 MG 24 hr capsule  Commonly known as:  EFFEXOR-XR        STOP taking these medications    ondansetron 4 MG tablet  Commonly known as:  RANJIT Chen MD  General Surgery  [facility]

## 2020-05-29 NOTE — PLAN OF CARE
All progress notes reviewed. VN cued into room. No response to VN's verbal cues x 2 attempts. Pt not visualized in room. Pt off unit for surgery.

## 2020-05-29 NOTE — TRANSFER OF CARE
"Anesthesia Transfer of Care Note    Patient: Daniel Behlar    Procedure(s) Performed: Procedure(s) (LRB):  CHOLECYSTECTOMY, LAPAROSCOPIC (N/A)    Patient location: PACU    Anesthesia Type: general    Transport from OR: Transported from OR on 2-3 L/min O2 by NC with adequate spontaneous ventilation    Post pain: adequate analgesia    Post assessment: no apparent anesthetic complications    Post vital signs: stable    Level of consciousness: awake, alert and oriented    Nausea/Vomiting: no nausea/vomiting    Complications: none    Transfer of care protocol was followed      Last vitals:   Visit Vitals  BP (!) 165/76   Pulse 82   Temp 36.5 °C (97.7 °F) (Temporal)   Resp 14   Ht 5' 11" (1.803 m)   Wt 96 kg (211 lb 10.3 oz)   SpO2 98%   BMI 29.52 kg/m²     "

## 2020-06-01 DIAGNOSIS — G89.18 POST-OP PAIN: Primary | ICD-10-CM

## 2020-06-01 LAB
FINAL PATHOLOGIC DIAGNOSIS: NORMAL
GROSS: NORMAL

## 2020-06-01 RX ORDER — OXYCODONE AND ACETAMINOPHEN 5; 325 MG/1; MG/1
1 TABLET ORAL EVERY 6 HOURS PRN
Qty: 20 TABLET | Refills: 0 | Status: SHIPPED | OUTPATIENT
Start: 2020-06-01 | End: 2020-06-01 | Stop reason: SDUPTHER

## 2020-06-01 RX ORDER — OXYCODONE AND ACETAMINOPHEN 5; 325 MG/1; MG/1
1 TABLET ORAL EVERY 6 HOURS PRN
Qty: 20 TABLET | Refills: 0 | Status: SHIPPED | OUTPATIENT
Start: 2020-06-01 | End: 2020-06-06

## 2020-06-04 ENCOUNTER — OFFICE VISIT (OUTPATIENT)
Dept: FAMILY MEDICINE | Facility: HOSPITAL | Age: 35
End: 2020-06-04
Attending: FAMILY MEDICINE
Payer: MEDICAID

## 2020-06-04 VITALS
WEIGHT: 222 LBS | SYSTOLIC BLOOD PRESSURE: 116 MMHG | HEIGHT: 71 IN | HEART RATE: 81 BPM | BODY MASS INDEX: 31.08 KG/M2 | DIASTOLIC BLOOD PRESSURE: 80 MMHG

## 2020-06-04 DIAGNOSIS — F31.9 BIPOLAR 1 DISORDER: ICD-10-CM

## 2020-06-04 DIAGNOSIS — Z90.49 S/P CHOLECYSTECTOMY: Primary | ICD-10-CM

## 2020-06-04 PROCEDURE — 99213 OFFICE O/P EST LOW 20 MIN: CPT | Performed by: STUDENT IN AN ORGANIZED HEALTH CARE EDUCATION/TRAINING PROGRAM

## 2020-06-04 RX ORDER — HYDROXYZINE HYDROCHLORIDE 50 MG/1
50 TABLET, FILM COATED ORAL NIGHTLY PRN
Qty: 30 TABLET | Refills: 12 | Status: SHIPPED | OUTPATIENT
Start: 2020-06-04 | End: 2020-07-31 | Stop reason: SDUPTHER

## 2020-06-04 NOTE — PROGRESS NOTES
Subjective:       Patient ID: Daniel Behlar is a 35 y.o. male.    Chief Complaint: PostOp follow up     HPI     Mr. Behlar is here post-op following his cholecystectomy a week ago (5/28). He has been experiencing residual pain at surgical site, especially with coughing and yawning, that spreads to the right shoulder. He is on percocet and feels that his pain is adequately controlled. Denies any constipation, abdominal bloating, fever, nausea, vomiting, or chills.    Patient has a history of bipolar disorder and says today that his mood has been a lot better since last visit. He is able to recognize when he is going into a manic episode and is able to center himself. He says these episodes come every two weeks and last 2-3 days. They are associated with decreased need for sleep, hypersexuality, flight of ideas, and an increase in activity. He hasn't been experiencing any depressive symptoms. Patient otherwise remains functional. Improved functionality at work and relationship with partner. He has a therapist at [facility] and talks to them via phone every two weeks.He has gained 35 pounds in the past 2 months and is interested in switching off his Remeron.    Review of Systems   Constitutional: Negative for activity change and appetite change.   HENT: Negative for trouble swallowing.    Eyes: Negative for visual disturbance.   Respiratory: Negative for shortness of breath.    Cardiovascular: Negative for chest pain and palpitations.   Gastrointestinal: Positive for abdominal pain. Negative for abdominal distention, diarrhea, nausea and vomiting.   Endocrine: Negative for cold intolerance and heat intolerance.   Genitourinary: Negative for difficulty urinating and flank pain.   Musculoskeletal: Negative for arthralgias.   Skin: Negative for color change.   Allergic/Immunologic: Negative for immunocompromised state.   Neurological: Negative for headaches.   Hematological: Negative for  adenopathy.   Psychiatric/Behavioral: Negative for agitation and suicidal ideas. The patient is hyperactive.        Objective:      Vitals:    06/04/20 1544   BP: 116/80   Pulse: 81     Physical Exam   Constitutional: He is oriented to person, place, and time. He appears well-developed and well-nourished.   HENT:   Head: Normocephalic and atraumatic.   Eyes: Pupils are equal, round, and reactive to light. EOM are normal.   Neck: Normal range of motion. Neck supple.   Cardiovascular: Normal rate, regular rhythm, normal heart sounds and intact distal pulses. Exam reveals no gallop and no friction rub.   No murmur heard.  Pulmonary/Chest: Effort normal and breath sounds normal. He has no wheezes. He has no rales. He exhibits no tenderness.   Abdominal: Soft. Bowel sounds are normal. He exhibits no distension. There is no tenderness. There is no guarding.   Surgical sites are clean, dry, with no erythema, no signs of infection, and are healing well.   Musculoskeletal: Normal range of motion.   Neurological: He is alert and oriented to person, place, and time.   Skin: Skin is warm and dry. Capillary refill takes less than 2 seconds.   Psychiatric: He has a normal mood and affect. His behavior is normal. Thought content normal.       Assessment:       1. S/P cholecystectomy    2. Bipolar 1 disorder        Plan:       S/P cholecystectomy       -      Advance diet as tolerated        -      Continue Pain control PRN       -      Patient to follow up with general surgery if complications of surgery develop     Bipolar 1 disorder        -     Symptoms well controlled         -     Mirtazapine d/c 2/2 to weight gain   -     hydrOXYzine (ATARAX) 50 MG tablet; Take 1 tablet (50 mg total) by mouth nightly as needed for Anxiety (Insomnia).  Dispense: 30 tablet; Refill: 12    Follow up if symptoms worsen or fail to improve.     Kike Banks MD  [employer] [facility], PGY-2

## 2020-06-05 NOTE — PROGRESS NOTES
I assume primary medical responsibility for this patient. I have reviewed the history, physical, and assessement & treatment plan with the resident and agree that the care is reasonable and necessary. This service has been performed by a resident without the presence of a teaching physician under the primary care exception. If necessary, an addendum of additional findings or evaluation beyond the resident documentation will be noted below.    Bipolar dz - work on getting into psych, much better controlled  S/p cholecystectomy - slowly expanding diet    Padmini Garcia MD

## 2020-06-07 ENCOUNTER — NURSE TRIAGE (OUTPATIENT)
Dept: ADMINISTRATIVE | Facility: CLINIC | Age: 35
End: 2020-06-07

## 2020-06-07 NOTE — TELEPHONE ENCOUNTER
Contacted pt regarding text response to COVID Home Symptom/Post Procedural Monitoring Program triage queue.  Pt reports mild pain on R abdomen surgical site, but inceasing radiating pain to R shoulder, causing pt to be unable to sleep, get comfortable while awake or perform normal daily tasks.  Pt reports some relief with prescribed medication, but not sufficient.  Advised pt to continue pain medication as prescribed, alternating with Tylenol as needed.  Triage disposition recommends contacting PCP/surgeon now, unable to reach the surgeon on call for Dr. Bolden at this time.  Disposition escalated to telemedicine visit.  Pt to schedule virtual visit via [facility].  Pt given [facility] number and instructed to call back with worsening symptoms, questions or concerns.  Pt verbalized understanding.  Message routed to Dr. Bolden's office for follow-up.      Reason for Disposition   [1] Post-op pain AND [2] not controlled with pain medications    Additional Information   Negative: SEVERE difficulty breathing (e.g., struggling for each breath, speaks in single words)   Negative: Difficult to awaken or acting confused (e.g., disoriented, slurred speech)   Negative: Bluish (or gray) lips or face now   Negative: Shock suspected (e.g., cold/pale/clammy skin, too weak to stand, low BP, rapid pulse)   Negative: Sounds like a life-threatening emergency to the triager   Negative: [1] COVID-19 exposure AND [2] NO symptoms   Negative: COVID-19 and Breastfeeding, questions about   Negative: [1] Adult with possible COVID-19 symptoms AND [2] triager concerned about severity of symptoms or other causes   Negative: SEVERE or constant chest pain or pressure (Exception: mild central chest pain, present only when coughing)   Negative: MODERATE difficulty breathing (e.g., speaks in phrases, SOB even at rest, pulse 100-120)   Negative: Patient sounds very sick or weak to the triager   Negative: MILD  difficulty breathing (e.g., minimal/no SOB at rest, SOB with walking, pulse <100)   Negative: Chest pain or pressure   Negative: Fever > 103 F (39.4 C)   Negative: [1] Fever > 101 F (38.3 C) AND [2] age > 60   Negative: [1] Fever > 100.0 F (37.8 C) AND [2] bedridden (e.g., nursing home patient, CVA, chronic illness, recovering from surgery)   Negative: HIGH RISK patient (e.g., age > 64 years, diabetes, heart or lung disease, weak immune system)   Negative: Fever present > 3 days (72 hours)   Negative: [1] Fever returns after gone for over 24 hours AND [2] symptoms worse or not improved   Negative: [1] Continuous (nonstop) coughing interferes with work or school AND [2] no improvement using cough treatment per protocol   Negative: [1] COVID-19 infection suspected by caller or triager AND [2] mild symptoms (cough, fever, or others) AND [3] no complications or SOB   Negative: Cough present > 3 weeks   Negative: [1] COVID-19 diagnosed by positive lab test AND [2] mild symptoms (e.g., cough, fever, others) AND [3] no complications or SOB   Negative: [1] COVID-19 diagnosed by HCP (doctor, NP or PA) AND [2] mild symptoms (e.g., cough, fever, others) AND [3] no complications or SOB   Negative: COVID-19 Home Isolation, questions about   Negative: COVID-19 Testing, questions about   Negative: COVID-19 Prevention and Healthy Living, questions about   Negative: COVID-19 Disease, questions about   Negative: [1] Bleeding from nose, mouth, tonsil, vomiting, anus, vagina, bladder or other surgical site AND [2] large amount   Negative: Sounds like a life-threatening emergency to the triager   Negative: Tonsil or adenoid surgery symptoms or questions   Negative: Surgical incision symptoms and questions   Negative: Cast questions   Negative: [1] Discomfort (pain, burning or stinging) when passing urine AND [2] male   Negative: [1] Discomfort (pain, burning or stinging) when passing urine AND [2] female    Negative: Constipation   Negative: Calf pain   Negative: Dizziness is severe or persists > 24 hours after surgery   Negative: [1] Bleeding from incision AND [2] won't stop after 10 minutes of direct pressure (using correct technique)   Negative: [1] Widespread rash AND [2] bright red, sunburn-like   Negative: [1] SEVERE pain (excruciating) AND [2] not improved after 2 hours of pain medicine   Negative: [1] Severe headache AND [2] after spinal (epidural) anesthesia   Negative: [1] Fever AND [2] follows MAJOR surgery (e.g., head, neck, back, heart, thoracic, abdominal)   Negative: [1] Vomiting AND [2] persists > 4 hours   Negative: Blood (red or coffee-ground color) in the vomit  (Exception: from a nosebleed)   Negative: Bile (green color) in the vomit (Exception: stomach juice which is yellow)   Negative: [1] Vomiting AND [2] abdomen is more swollen than usual   Negative: [1] Drinking very little AND [2] signs of dehydration (decreased urine output, very dry mouth, no tears, etc.)   Negative: Sounds like a serious complication to the triager   Negative: Child sounds very sick or weak to the triager    Protocols used: POST-OP SYMPTOMS AND [id number], CORONAVIRUS (COVID-19) DIAGNOSED OR [id number]

## 2020-06-17 ENCOUNTER — CLINICAL SUPPORT (OUTPATIENT)
Dept: SMOKING CESSATION | Facility: CLINIC | Age: 35
End: 2020-06-17

## 2020-06-17 VITALS — BODY MASS INDEX: 28 KG/M2 | HEIGHT: 71 IN | WEIGHT: 200 LBS

## 2020-06-17 DIAGNOSIS — F17.210 MODERATE SMOKER (20 OR LESS PER DAY): Primary | ICD-10-CM

## 2020-06-17 PROCEDURE — 99999 PR PBB SHADOW E&M-EST. PATIENT-LVL II: ICD-10-PCS | Mod: PBBFAC,,,

## 2020-06-17 PROCEDURE — 99999 PR PBB SHADOW E&M-EST. PATIENT-LVL II: CPT | Mod: PBBFAC,,,

## 2020-06-17 PROCEDURE — 99404 PREV MED CNSL INDIV APPRX 60: CPT | Mod: S$GLB,,,

## 2020-06-17 PROCEDURE — 99404 PR PREVENT COUNSEL,INDIV,60 MIN: ICD-10-PCS | Mod: S$GLB,,,

## 2020-06-17 RX ORDER — VARENICLINE TARTRATE 0.5 (11)-1
KIT ORAL
Qty: 53 TABLET | Refills: 0 | Status: SHIPPED | OUTPATIENT
Start: 2020-06-17 | End: 2020-08-23

## 2020-06-17 NOTE — Clinical Note
Your patient has just enrolled into the smoking cessation program. CESD of 4 is preceived as no mental distress or depression at this time.  FTND of 3 indicates a low level of tobacco dependency. Smokes 20 cigarettes per day. Starting Chantix has used chantix in the past.

## 2020-06-17 NOTE — PROGRESS NOTES
CESD of 4 is preceived as no mental distress or depression at this time.  FTND of 3 indicates a low level of tobacco dependency. Smokes 20 cigarettes per day. Starting Chantix has used chantix in the past.

## 2020-06-18 RX ORDER — FLUOXETINE HYDROCHLORIDE 20 MG/1
20 CAPSULE ORAL DAILY
Qty: 30 CAPSULE | Refills: 12 | Status: SHIPPED | OUTPATIENT
Start: 2020-06-18 | End: 2020-07-31 | Stop reason: SDUPTHER

## 2020-06-18 RX ORDER — FLUOXETINE HYDROCHLORIDE 20 MG/1
20 CAPSULE ORAL DAILY
OUTPATIENT
Start: 2020-06-18

## 2020-06-25 ENCOUNTER — CLINICAL SUPPORT (OUTPATIENT)
Dept: SMOKING CESSATION | Facility: CLINIC | Age: 35
End: 2020-06-25

## 2020-06-25 DIAGNOSIS — F17.210 LIGHT CIGARETTE SMOKER (1-9 CIGARETTES PER DAY): Primary | ICD-10-CM

## 2020-06-25 PROCEDURE — 99999 PR PBB SHADOW E&M-EST. PATIENT-LVL I: CPT | Mod: PBBFAC,,,

## 2020-06-25 PROCEDURE — 99402 PR PREVENT COUNSEL,INDIV,30 MIN: ICD-10-PCS | Mod: S$GLB,,,

## 2020-06-25 PROCEDURE — 99402 PREV MED CNSL INDIV APPRX 30: CPT | Mod: S$GLB,,,

## 2020-06-25 PROCEDURE — 99999 PR PBB SHADOW E&M-EST. PATIENT-LVL I: ICD-10-PCS | Mod: PBBFAC,,,

## 2020-06-25 NOTE — Clinical Note
Just a note to advise how the patient is progressing in the tobacco cessation program. Patient states he's smoking 5 cigarettes per day, down from 20. Commended patient on this. The patient remains on the prescribed tobacco cessation medication regimen of Chantix Starter Pack without any negative side effects at this time. Session Focus:  orientation, client introductions, completion of TCRS (Tobacco Cessation Rating Scale) learned addiction model, cues/triggers, personal reasons for quitting, medications, goals, quit date. The patient denies any abnormal behavioral or mental changes at this time. The patient will continue with group therapy sessions and medication monitoring by CTTS. Prescribed medication management will be by physician.

## 2020-06-25 NOTE — PROGRESS NOTES
Individual Follow-Up Form    6/25/2020    Quit Date:     Clinical Status of Patient: Outpatient    Length of Service: 30 minutes    Continuing Medication: yes  Chantix    Other Medications:      Target Symptoms: Withdrawal and medication side effects. The following were  rated moderate (3) to severe (4) on TCRS:  · Moderate (3): Desire or Crave Tobacco   · Severe (4): None    Comments: Patient states he's smoking 5 cigarettes per day, down from 20. Commended patient on this. The patient remains on the prescribed tobacco cessation medication regimen of Chantix Starter Pack without any negative side effects at this time. Session Focus:  orientation, client introductions, completion of TCRS (Tobacco Cessation Rating Scale) learned addiction model, cues/triggers, personal reasons for quitting, medications, goals, quit date. The patient denies any abnormal behavioral or mental changes at this time. The patient will continue with group therapy sessions and medication monitoring by CTTS. Prescribed medication management will be by physician.     Diagnosis: F17.210    Next Visit: 2 weeks

## 2020-06-26 ENCOUNTER — HOSPITAL ENCOUNTER (EMERGENCY)
Facility: HOSPITAL | Age: 35
Discharge: HOME | End: 2020-06-26
Attending: EMERGENCY MEDICINE
Payer: MEDICAID

## 2020-06-26 VITALS
RESPIRATION RATE: 18 BRPM | SYSTOLIC BLOOD PRESSURE: 130 MMHG | WEIGHT: 200 LBS | OXYGEN SATURATION: 99 % | BODY MASS INDEX: 27.89 KG/M2 | TEMPERATURE: 99 F | HEART RATE: 85 BPM | DIASTOLIC BLOOD PRESSURE: 82 MMHG

## 2020-06-26 DIAGNOSIS — T14.8XXA STAB WOUND: ICD-10-CM

## 2020-06-26 DIAGNOSIS — S51.812A LACERATION OF LEFT FOREARM, INITIAL ENCOUNTER: Primary | ICD-10-CM

## 2020-06-26 PROCEDURE — 25000003 PHARM REV CODE 250: Performed by: PHYSICIAN ASSISTANT

## 2020-06-26 PROCEDURE — 12001 RPR S/N/AX/GEN/TRNK 2.5CM/<: CPT

## 2020-06-26 PROCEDURE — 99283 EMERGENCY DEPT VISIT LOW MDM: CPT | Mod: 25

## 2020-06-26 RX ORDER — LIDOCAINE HYDROCHLORIDE 10 MG/ML
10 INJECTION INFILTRATION; PERINEURAL
Status: COMPLETED | OUTPATIENT
Start: 2020-06-26 | End: 2020-06-26

## 2020-06-26 RX ORDER — IBUPROFEN 600 MG/1
600 TABLET ORAL
Status: COMPLETED | OUTPATIENT
Start: 2020-06-26 | End: 2020-06-26

## 2020-06-26 RX ADMIN — LIDOCAINE HYDROCHLORIDE 10 ML: 10 INJECTION, SOLUTION INFILTRATION; PERINEURAL at 07:06

## 2020-06-26 RX ADMIN — IBUPROFEN 600 MG: 600 TABLET, FILM COATED ORAL at 07:06

## 2020-06-26 NOTE — ED NOTES
Bed: EXAM 12  Expected date:   Expected time:   Means of arrival:   Comments:   Received report from day shift nurse. Assumed pt care at 1915. Pt is A&Ox4, resting comfortably in chair. Pt on 1.5 L of oxygen via nasal cannula. No signs of SOB/respiratory distress. No c/o pain to R arm 6/10 and L hip 3/10 on the scale of 0-10. Given Morphine 4mg per MAR. Assessment completed, Labs noted, VSS. Surgical dressing to L hip in place CDI, +CMS, + pulse, able to wiggle toes and dorsi/plantar flex. Polar ice & SCDs on.  Educated pt the importance to call for assistance. Fall precautions in place. Bed at lowest position. Bed locked. Call light and personal belongings within reach. Continue to monitor.
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
## 2020-06-27 NOTE — ED NOTES
Clean dressing applied. Advised pt to monitor for symptoms of infection and to return to ED if any occur. Also advised pt to contact physician in 1 week for removal.

## 2020-06-27 NOTE — ED NOTES
Pt c/o laceration to inside of left forearm. Pt reports he was cutting a door at work and the machinery slipped. Laceration is approximately 1/4 inch length. Pt is AAOx3. Respirations even and unlabored. Cap refill in LUE is less than 3 seconds. Skin is warm and dry without discoloration. NAD noted. CB within reach.

## 2020-07-10 RX ORDER — QUETIAPINE FUMARATE 300 MG/1
300 TABLET, FILM COATED ORAL NIGHTLY
Qty: 30 TABLET | Refills: 6 | Status: SHIPPED | OUTPATIENT
Start: 2020-07-10 | End: 2021-02-04 | Stop reason: SDUPTHER

## 2020-07-10 NOTE — PROGRESS NOTES
Increased from Seroquel 200mg to 300mg. Patient with increased riley remains functional. Will see me in clinic Tuesday. If any suicidal or homicidal thoughts, patient instructed to go to the ED immediately or call 911.

## 2020-07-20 DIAGNOSIS — F17.210 MODERATE SMOKER (20 OR LESS PER DAY): ICD-10-CM

## 2020-07-20 DIAGNOSIS — F31.9 BIPOLAR 1 DISORDER: ICD-10-CM

## 2020-07-21 ENCOUNTER — CLINICAL SUPPORT (OUTPATIENT)
Dept: SMOKING CESSATION | Facility: CLINIC | Age: 35
End: 2020-07-21

## 2020-07-21 DIAGNOSIS — F17.210 LIGHT CIGARETTE SMOKER (1-9 CIGARETTES PER DAY): Primary | ICD-10-CM

## 2020-07-21 PROCEDURE — 99401 PR PREVENT COUNSEL,INDIV,15 MIN: ICD-10-PCS | Mod: S$GLB,,,

## 2020-07-21 PROCEDURE — 99401 PREV MED CNSL INDIV APPRX 15: CPT | Mod: S$GLB,,,

## 2020-07-21 PROCEDURE — 99999 PR PBB SHADOW E&M-EST. PATIENT-LVL I: CPT | Mod: PBBFAC,,,

## 2020-07-21 PROCEDURE — 99999 PR PBB SHADOW E&M-EST. PATIENT-LVL I: ICD-10-PCS | Mod: PBBFAC,,,

## 2020-07-21 RX ORDER — VARENICLINE TARTRATE 1 MG/1
1 TABLET, FILM COATED ORAL 2 TIMES DAILY
Qty: 60 TABLET | Refills: 0 | Status: SHIPPED | OUTPATIENT
Start: 2020-07-21 | End: 2020-08-23

## 2020-07-21 RX ORDER — PANTOPRAZOLE SODIUM 40 MG/1
40 TABLET, DELAYED RELEASE ORAL DAILY
Qty: 30 TABLET | Refills: 11 | Status: CANCELLED | OUTPATIENT
Start: 2020-07-21 | End: 2021-07-21

## 2020-07-21 RX ORDER — QUETIAPINE FUMARATE 300 MG/1
300 TABLET, FILM COATED ORAL NIGHTLY
Qty: 30 TABLET | Refills: 6 | Status: CANCELLED | OUTPATIENT
Start: 2020-07-21 | End: 2021-07-21

## 2020-07-21 RX ORDER — FLUOXETINE HYDROCHLORIDE 20 MG/1
20 CAPSULE ORAL DAILY
Qty: 30 CAPSULE | Refills: 12 | Status: CANCELLED | OUTPATIENT
Start: 2020-07-21

## 2020-07-21 RX ORDER — HYDROXYZINE HYDROCHLORIDE 50 MG/1
50 TABLET, FILM COATED ORAL NIGHTLY PRN
Qty: 30 TABLET | Refills: 12 | Status: CANCELLED | OUTPATIENT
Start: 2020-07-21

## 2020-07-21 NOTE — Clinical Note
"Just a note to advise how the patient is progressing in the tobacco cessation program. Spoke with patient over the telephone for his smoking cessation session, he states he's "doing well" and smoking about 2-3 cigarettes per day. The patient remains on the prescribed tobacco cessation medication regimen of 1 mg Chantix BID without any negative side effects at this time. Session Focus: completion of TCRS (Tobacco Cessation Rating Scale) reviewed strategies, controlling environment, cues, triggers, new goals set. Introduced high risk situations with preparation interventions, caffeine similarities with withdrawal issues of habit and nicotine, Alcohol, Understanding urges, cravings, stress and relaxation. Open discussion with intervention discussion. The patient denies any abnormal behavioral or mental changes at this time. The patient will continue with group therapy sessions and medication monitoring by CTTS. Prescribed medication management will be by physician."

## 2020-07-22 RX ORDER — VARENICLINE TARTRATE 0.5 (11)-1
KIT ORAL
Qty: 53 TABLET | Refills: 0 | OUTPATIENT
Start: 2020-07-22

## 2020-07-22 NOTE — PROGRESS NOTES
"Individual Follow-Up Form    7/21/2020    Quit Date:     Clinical Status of Patient: Outpatient    Length of Service: 15 minutes    Continuing Medication: yes  Chantix    Other Medications:      Target Symptoms: Withdrawal and medication side effects. The following were  rated moderate (3) to severe (4) on TCRS:  · Moderate (3): Desire or Crave Tobacco   · Severe (4): None    Comments: Spoke with patient over the telephone for his smoking cessation session, he states he's "doing well" and smoking about 2-3 cigarettes per day. The patient remains on the prescribed tobacco cessation medication regimen of 1 mg Chantix BID without any negative side effects at this time. Session Focus: completion of TCRS (Tobacco Cessation Rating Scale) reviewed strategies, controlling environment, cues, triggers, new goals set. Introduced high risk situations with preparation interventions, caffeine similarities with withdrawal issues of habit and nicotine, Alcohol, Understanding urges, cravings, stress and relaxation. Open discussion with intervention discussion. The patient denies any abnormal behavioral or mental changes at this time. The patient will continue with group therapy sessions and medication monitoring by CTTS. Prescribed medication management will be by physician.     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

## 2020-07-22 NOTE — TELEPHONE ENCOUNTER
I've contacted the patient and have sent in a new Rx for 1 mg Chantix, as the refill he's requesting is for the Starter Pack anyway.

## 2020-08-19 DIAGNOSIS — F31.9 BIPOLAR 1 DISORDER: Primary | ICD-10-CM

## 2020-08-19 RX ORDER — TRAZODONE HYDROCHLORIDE 50 MG/1
50 TABLET ORAL NIGHTLY
Qty: 30 TABLET | Refills: 3 | Status: SHIPPED | OUTPATIENT
Start: 2020-08-19 | End: 2020-12-18 | Stop reason: SDUPTHER

## 2020-08-19 RX ORDER — HYDROXYZINE HYDROCHLORIDE 50 MG/1
50 TABLET, FILM COATED ORAL NIGHTLY
Qty: 60 TABLET | Refills: 2 | Status: SHIPPED | OUTPATIENT
Start: 2020-08-19 | End: 2020-08-19

## 2020-08-21 ENCOUNTER — HOSPITAL ENCOUNTER (EMERGENCY)
Facility: HOSPITAL | Age: 35
Discharge: HOME | End: 2020-08-21
Attending: EMERGENCY MEDICINE
Payer: MEDICAID

## 2020-08-21 VITALS
OXYGEN SATURATION: 100 % | HEART RATE: 110 BPM | RESPIRATION RATE: 18 BRPM | WEIGHT: 200 LBS | HEIGHT: 71 IN | SYSTOLIC BLOOD PRESSURE: 138 MMHG | DIASTOLIC BLOOD PRESSURE: 81 MMHG | BODY MASS INDEX: 28 KG/M2 | TEMPERATURE: 98 F

## 2020-08-21 DIAGNOSIS — T20.52XA: Primary | ICD-10-CM

## 2020-08-21 PROCEDURE — 99282 EMERGENCY DEPT VISIT SF MDM: CPT

## 2020-08-22 NOTE — DISCHARGE INSTRUCTIONS
Please take Tylenol Motrin as needed for pain, being put ice as well, take basal eating and aloe burn cream to the area should improve with the next couple days.  Return to the ER for any worsening swelling, shortness of breath or any concerning reason.
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
## 2020-08-23 ENCOUNTER — OFFICE VISIT (OUTPATIENT)
Dept: URGENT CARE | Facility: CLINIC | Age: 35
End: 2020-08-23
Payer: MEDICAID

## 2020-08-23 ENCOUNTER — NURSE TRIAGE (OUTPATIENT)
Dept: ADMINISTRATIVE | Facility: CLINIC | Age: 35
End: 2020-08-23

## 2020-08-23 VITALS
DIASTOLIC BLOOD PRESSURE: 85 MMHG | HEART RATE: 108 BPM | HEIGHT: 71 IN | WEIGHT: 200 LBS | SYSTOLIC BLOOD PRESSURE: 135 MMHG | OXYGEN SATURATION: 97 % | BODY MASS INDEX: 28 KG/M2 | RESPIRATION RATE: 18 BRPM | TEMPERATURE: 98 F

## 2020-08-23 DIAGNOSIS — T20.42XA: ICD-10-CM

## 2020-08-23 DIAGNOSIS — S09.93XA INJURY OF LIP, INITIAL ENCOUNTER: Primary | ICD-10-CM

## 2020-08-23 PROCEDURE — 99214 OFFICE O/P EST MOD 30 MIN: CPT | Mod: S$GLB,,, | Performed by: PHYSICIAN ASSISTANT

## 2020-08-23 PROCEDURE — 99214 PR OFFICE/OUTPT VISIT, EST, LEVL IV, 30-39 MIN: ICD-10-PCS | Mod: S$GLB,,, | Performed by: PHYSICIAN ASSISTANT

## 2020-08-23 RX ORDER — MUPIROCIN 20 MG/G
OINTMENT TOPICAL
Qty: 22 G | Refills: 0 | Status: SHIPPED | OUTPATIENT
Start: 2020-08-23 | End: 2024-02-14

## 2020-08-23 RX ORDER — SULFAMETHOXAZOLE AND TRIMETHOPRIM 800; 160 MG/1; MG/1
1 TABLET ORAL 2 TIMES DAILY
Qty: 14 TABLET | Refills: 0 | Status: SHIPPED | OUTPATIENT
Start: 2020-08-23 | End: 2020-08-30

## 2020-08-23 RX ORDER — HYDROCODONE BITARTRATE AND ACETAMINOPHEN 10; 325 MG/1; MG/1
1 TABLET ORAL EVERY 6 HOURS PRN
Qty: 12 TABLET | Refills: 0 | Status: SHIPPED | OUTPATIENT
Start: 2020-08-23 | End: 2024-02-14
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
## 2020-08-23 NOTE — TELEPHONE ENCOUNTER
Pt's calling regarding swollen lips. States his lips are swollen and red. Denies SOB or trouble swallowing. Per protocol, advised pt to see physician within 4 hours. Pt opted for Urgent Care.     Reason for Disposition   Swelling of lip   [1] Looks infected AND [2] large red area (> 2 in. or 5 cm)    Additional Information   Negative: Unresponsive, passed out or very weak   Negative: Swollen tongue   Negative: Difficulty breathing or wheezing   Negative: [1] Life-threatening reaction in the past to similar substance (e.g., food, insect bite/sting, chemical, etc.) AND [2] < 2 hours since exposure   Negative: Sounds like a life-threatening emergency to the triager   Negative: Taking an ACE Inhibitor medication  (e.g., benazepril/LOTENSIN, captopril/CAPOTEN, enalapril/VASOTEC, lisinopril/ZESTRIL)   Negative: [1] Severe swelling AND [2] cause unknown   Negative: Patient sounds very sick or weak to the triager   Negative: [1] Swelling is red AND [2] fever   Negative: [1] Swelling is painful to touch AND [2] fever    Protocols used: SKIN LUMP OR LOCALIZED SWELLING-A-AH, LIP SWELLING-A-AH
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
## 2020-08-24 ENCOUNTER — PATIENT OUTREACH (OUTPATIENT)
Dept: EMERGENCY MEDICINE | Facility: HOSPITAL | Age: 35
End: 2020-08-24

## 2020-08-24 ENCOUNTER — OFFICE VISIT (OUTPATIENT)
Dept: PLASTIC SURGERY | Facility: CLINIC | Age: 35
End: 2020-08-24
Payer: MEDICAID

## 2020-08-24 VITALS
HEART RATE: 107 BPM | BODY MASS INDEX: 27.99 KG/M2 | WEIGHT: 199.94 LBS | SYSTOLIC BLOOD PRESSURE: 120 MMHG | DIASTOLIC BLOOD PRESSURE: 87 MMHG | HEIGHT: 71 IN

## 2020-08-24 DIAGNOSIS — S09.93XA LIP INJURY, INITIAL ENCOUNTER: Primary | ICD-10-CM

## 2020-08-24 PROCEDURE — 99203 PR OFFICE/OUTPT VISIT, NEW, LEVL III, 30-44 MIN: ICD-10-PCS | Mod: S$PBB,,, | Performed by: SURGERY

## 2020-08-24 PROCEDURE — 99213 OFFICE O/P EST LOW 20 MIN: CPT | Mod: PBBFAC | Performed by: SURGERY

## 2020-08-24 PROCEDURE — 99203 OFFICE O/P NEW LOW 30 MIN: CPT | Mod: S$PBB,,, | Performed by: SURGERY

## 2020-08-24 PROCEDURE — 99999 PR PBB SHADOW E&M-EST. PATIENT-LVL III: ICD-10-PCS | Mod: PBBFAC,,, | Performed by: SURGERY

## 2020-08-24 PROCEDURE — 99999 PR PBB SHADOW E&M-EST. PATIENT-LVL III: CPT | Mod: PBBFAC,,, | Performed by: SURGERY
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
## 2020-09-02 NOTE — PROGRESS NOTES
Attempted to contact patient on 3 separate occasions, patient is unable to reach. ED Navigator to close encounter at this time.

## 2020-09-03 RX ORDER — METHOCARBAMOL 500 MG/1
500 TABLET, FILM COATED ORAL 4 TIMES DAILY
Qty: 40 TABLET | Refills: 0 | Status: SHIPPED | OUTPATIENT
Start: 2020-09-03 | End: 2020-09-13

## 2020-09-03 RX ORDER — NAPROXEN 500 MG/1
500 TABLET ORAL 2 TIMES DAILY WITH MEALS
Qty: 60 TABLET | Refills: 0 | Status: SHIPPED | OUTPATIENT
Start: 2020-09-03 | End: 2020-10-03

## 2020-10-24 ENCOUNTER — PATIENT MESSAGE (OUTPATIENT)
Dept: FAMILY MEDICINE | Facility: HOSPITAL | Age: 35
End: 2020-10-24

## 2020-10-29 RX ORDER — METHOCARBAMOL 500 MG/1
500 TABLET, FILM COATED ORAL 4 TIMES DAILY
COMMUNITY
End: 2020-10-29 | Stop reason: SDUPTHER

## 2020-11-02 RX ORDER — METHOCARBAMOL 500 MG/1
500 TABLET, FILM COATED ORAL 4 TIMES DAILY
Qty: 40 TABLET | Refills: 0 | Status: SHIPPED | OUTPATIENT
Start: 2020-11-02 | End: 2020-11-12

## 2020-12-20 RX ORDER — TRAZODONE HYDROCHLORIDE 50 MG/1
50 TABLET ORAL NIGHTLY
Qty: 30 TABLET | Refills: 3 | Status: SHIPPED | OUTPATIENT
Start: 2020-12-20 | End: 2021-08-04 | Stop reason: SDUPTHER

## 2021-02-05 RX ORDER — QUETIAPINE FUMARATE 300 MG/1
300 TABLET, FILM COATED ORAL NIGHTLY
Qty: 30 TABLET | Refills: 6 | Status: SHIPPED | OUTPATIENT
Start: 2021-02-05 | End: 2022-02-05

## 2021-04-28 ENCOUNTER — PATIENT MESSAGE (OUTPATIENT)
Dept: RESEARCH | Facility: HOSPITAL | Age: 36
End: 2021-04-28

## 2021-05-19 ENCOUNTER — OFFICE VISIT (OUTPATIENT)
Dept: FAMILY MEDICINE | Facility: HOSPITAL | Age: 36
End: 2021-05-19
Attending: FAMILY MEDICINE
Payer: MEDICAID

## 2021-05-19 VITALS
DIASTOLIC BLOOD PRESSURE: 70 MMHG | BODY MASS INDEX: 29.87 KG/M2 | SYSTOLIC BLOOD PRESSURE: 158 MMHG | HEIGHT: 71 IN | WEIGHT: 213.38 LBS | HEART RATE: 76 BPM

## 2021-05-19 DIAGNOSIS — K13.0 MUCOCELE OF LOWER LIP: ICD-10-CM

## 2021-05-19 DIAGNOSIS — M25.511 RIGHT SHOULDER PAIN, UNSPECIFIED CHRONICITY: Primary | ICD-10-CM

## 2021-05-19 PROCEDURE — 99214 OFFICE O/P EST MOD 30 MIN: CPT | Performed by: STUDENT IN AN ORGANIZED HEALTH CARE EDUCATION/TRAINING PROGRAM

## 2021-05-19 RX ORDER — HYDROXYZINE HYDROCHLORIDE 25 MG/1
25 TABLET, FILM COATED ORAL 3 TIMES DAILY PRN
COMMUNITY
End: 2024-02-14

## 2021-05-19 RX ORDER — LIDOCAINE 50 MG/G
1 PATCH TOPICAL DAILY
Qty: 30 PATCH | Refills: 0 | Status: SHIPPED | OUTPATIENT
Start: 2021-05-19 | End: 2024-02-09 | Stop reason: ALTCHOICE

## 2021-05-19 RX ORDER — CYCLOBENZAPRINE HCL 10 MG
10 TABLET ORAL 2 TIMES DAILY PRN
Qty: 60 TABLET | Refills: 0 | Status: SHIPPED | OUTPATIENT
Start: 2021-05-19 | End: 2021-06-18

## 2021-05-19 RX ORDER — DICLOFENAC SODIUM 50 MG/1
50 TABLET, DELAYED RELEASE ORAL 2 TIMES DAILY
Qty: 60 TABLET | Refills: 0 | Status: SHIPPED | OUTPATIENT
Start: 2021-05-19 | End: 2021-06-18

## 2021-05-19 RX ORDER — MIRTAZAPINE 15 MG/1
15 TABLET, ORALLY DISINTEGRATING ORAL
COMMUNITY
End: 2024-02-14

## 2021-05-20 ENCOUNTER — HOSPITAL ENCOUNTER (OUTPATIENT)
Dept: RADIOLOGY | Facility: HOSPITAL | Age: 36
Discharge: HOME | End: 2021-05-20
Attending: STUDENT IN AN ORGANIZED HEALTH CARE EDUCATION/TRAINING PROGRAM
Payer: MEDICAID

## 2021-05-20 DIAGNOSIS — M25.511 RIGHT SHOULDER PAIN, UNSPECIFIED CHRONICITY: ICD-10-CM

## 2021-05-20 PROCEDURE — 73030 X-RAY EXAM OF SHOULDER: CPT | Mod: TC,FY,RT

## 2021-05-20 PROCEDURE — 73030 X-RAY EXAM OF SHOULDER: CPT | Mod: 26,RT,, | Performed by: RADIOLOGY

## 2021-05-20 PROCEDURE — 73030 XR SHOULDER COMPLETE 2 OR MORE VIEWS RIGHT: ICD-10-PCS | Mod: 26,RT,, | Performed by: RADIOLOGY

## 2021-06-03 ENCOUNTER — OFFICE VISIT (OUTPATIENT)
Dept: FAMILY MEDICINE | Facility: HOSPITAL | Age: 36
End: 2021-06-03
Payer: MEDICAID

## 2021-06-03 VITALS
BODY MASS INDEX: 30.19 KG/M2 | DIASTOLIC BLOOD PRESSURE: 91 MMHG | HEART RATE: 91 BPM | SYSTOLIC BLOOD PRESSURE: 132 MMHG | HEIGHT: 71 IN | WEIGHT: 215.63 LBS

## 2021-06-03 DIAGNOSIS — M72.2 PLANTAR FASCIITIS OF LEFT FOOT: ICD-10-CM

## 2021-06-03 DIAGNOSIS — M75.41 SHOULDER IMPINGEMENT SYNDROME, RIGHT: Primary | ICD-10-CM

## 2021-06-03 PROCEDURE — 20610 DRAIN/INJ JOINT/BURSA W/O US: CPT | Mod: RT

## 2021-06-03 PROCEDURE — 99213 OFFICE O/P EST LOW 20 MIN: CPT | Mod: 25 | Performed by: STUDENT IN AN ORGANIZED HEALTH CARE EDUCATION/TRAINING PROGRAM

## 2021-06-03 RX ORDER — HYDROXYZINE HYDROCHLORIDE 50 MG/1
50 TABLET, FILM COATED ORAL NIGHTLY
COMMUNITY
Start: 2021-05-19 | End: 2024-02-14

## 2021-06-03 RX ORDER — MIRTAZAPINE 30 MG/1
30 TABLET, FILM COATED ORAL NIGHTLY
COMMUNITY
Start: 2021-01-12 | End: 2024-02-14

## 2021-06-03 RX ORDER — QUETIAPINE FUMARATE 200 MG/1
200 TABLET, FILM COATED ORAL NIGHTLY
COMMUNITY
Start: 2021-02-02

## 2021-06-03 RX ORDER — PRAZOSIN HYDROCHLORIDE 2 MG/1
2 CAPSULE ORAL NIGHTLY
COMMUNITY
Start: 2021-05-24 | End: 2024-02-14

## 2021-07-01 ENCOUNTER — TELEPHONE (OUTPATIENT)
Dept: SMOKING CESSATION | Facility: CLINIC | Age: 36
End: 2021-07-01

## 2021-07-26 ENCOUNTER — TELEPHONE (OUTPATIENT)
Dept: FAMILY MEDICINE | Facility: HOSPITAL | Age: 36
End: 2021-07-26

## 2021-08-04 RX ORDER — FLUOXETINE HYDROCHLORIDE 20 MG/1
20 CAPSULE ORAL DAILY
Qty: 30 CAPSULE | Refills: 3 | Status: SHIPPED | OUTPATIENT
Start: 2021-08-04 | End: 2022-03-09 | Stop reason: SDUPTHER

## 2021-08-04 RX ORDER — TRAZODONE HYDROCHLORIDE 50 MG/1
50 TABLET ORAL NIGHTLY
Qty: 30 TABLET | Refills: 3 | Status: SHIPPED | OUTPATIENT
Start: 2021-08-04 | End: 2024-03-04

## 2021-08-04 RX ORDER — PANTOPRAZOLE SODIUM 40 MG/1
40 TABLET, DELAYED RELEASE ORAL DAILY
Qty: 30 TABLET | Refills: 3 | Status: SHIPPED | OUTPATIENT
Start: 2021-08-04 | End: 2024-02-26

## 2021-08-18 ENCOUNTER — HOSPITAL ENCOUNTER (EMERGENCY)
Facility: HOSPITAL | Age: 36
Discharge: LEFT BEFORE DISCHARGE | End: 2021-08-18
Attending: EMERGENCY MEDICINE
Payer: MEDICAID

## 2021-08-18 VITALS
TEMPERATURE: 98 F | WEIGHT: 200 LBS | DIASTOLIC BLOOD PRESSURE: 88 MMHG | RESPIRATION RATE: 19 BRPM | BODY MASS INDEX: 27.89 KG/M2 | SYSTOLIC BLOOD PRESSURE: 135 MMHG | HEART RATE: 85 BPM | OXYGEN SATURATION: 96 %

## 2021-08-18 DIAGNOSIS — R53.83 FATIGUE: ICD-10-CM

## 2021-08-18 DIAGNOSIS — Z20.822 LAB TEST NEGATIVE FOR COVID-19 VIRUS: Primary | ICD-10-CM

## 2021-08-18 DIAGNOSIS — R53.1 WEAKNESS: ICD-10-CM

## 2021-08-18 LAB
ALBUMIN SERPL BCP-MCNC: 4 G/DL (ref 3.5–5.2)
ALP SERPL-CCNC: 94 U/L (ref 55–135)
ALT SERPL W/O P-5'-P-CCNC: 36 U/L (ref 10–44)
ANION GAP SERPL CALC-SCNC: 17 MMOL/L (ref 8–16)
AST SERPL-CCNC: 23 U/L (ref 10–40)
BASOPHILS # BLD AUTO: 0.06 K/UL (ref 0–0.2)
BASOPHILS NFR BLD: 0.5 % (ref 0–1.9)
BILIRUB SERPL-MCNC: 0.2 MG/DL (ref 0.1–1)
BUN SERPL-MCNC: 11 MG/DL (ref 6–20)
CALCIUM SERPL-MCNC: 10.6 MG/DL (ref 8.7–10.5)
CHLORIDE SERPL-SCNC: 102 MMOL/L (ref 95–110)
CK SERPL-CCNC: 61 U/L (ref 20–200)
CO2 SERPL-SCNC: 23 MMOL/L (ref 23–29)
CREAT SERPL-MCNC: 1 MG/DL (ref 0.5–1.4)
CTP QC/QA: YES
DIFFERENTIAL METHOD: ABNORMAL
EOSINOPHIL # BLD AUTO: 0.3 K/UL (ref 0–0.5)
EOSINOPHIL NFR BLD: 2.3 % (ref 0–8)
ERYTHROCYTE [DISTWIDTH] IN BLOOD BY AUTOMATED COUNT: 11.9 % (ref 11.5–14.5)
EST. GFR  (AFRICAN AMERICAN): >60 ML/MIN/1.73 M^2
EST. GFR  (NON AFRICAN AMERICAN): >60 ML/MIN/1.73 M^2
GLUCOSE SERPL-MCNC: 95 MG/DL (ref 70–110)
HCT VFR BLD AUTO: 41.5 % (ref 40–54)
HGB BLD-MCNC: 13.7 G/DL (ref 14–18)
IMM GRANULOCYTES # BLD AUTO: 0.06 K/UL (ref 0–0.04)
IMM GRANULOCYTES NFR BLD AUTO: 0.5 % (ref 0–0.5)
LIPASE SERPL-CCNC: 11 U/L (ref 4–60)
LYMPHOCYTES # BLD AUTO: 2.1 K/UL (ref 1–4.8)
LYMPHOCYTES NFR BLD: 18.6 % (ref 18–48)
MAGNESIUM SERPL-MCNC: 2.1 MG/DL (ref 1.6–2.6)
MCH RBC QN AUTO: 29.3 PG (ref 27–31)
MCHC RBC AUTO-ENTMCNC: 33 G/DL (ref 32–36)
MCV RBC AUTO: 89 FL (ref 82–98)
MONOCYTES # BLD AUTO: 0.8 K/UL (ref 0.3–1)
MONOCYTES NFR BLD: 6.9 % (ref 4–15)
NEUTROPHILS # BLD AUTO: 8.1 K/UL (ref 1.8–7.7)
NEUTROPHILS NFR BLD: 71.2 % (ref 38–73)
NRBC BLD-RTO: 0 /100 WBC
PLATELET # BLD AUTO: 476 K/UL (ref 150–450)
PMV BLD AUTO: 8.4 FL (ref 9.2–12.9)
POTASSIUM SERPL-SCNC: 3.7 MMOL/L (ref 3.5–5.1)
PROT SERPL-MCNC: 9.3 G/DL (ref 6–8.4)
RBC # BLD AUTO: 4.68 M/UL (ref 4.6–6.2)
SARS-COV-2 RDRP RESP QL NAA+PROBE: NEGATIVE
SODIUM SERPL-SCNC: 142 MMOL/L (ref 136–145)
TSH SERPL DL<=0.005 MIU/L-ACNC: 2.85 UIU/ML (ref 0.4–4)
WBC # BLD AUTO: 11.37 K/UL (ref 3.9–12.7)

## 2021-08-18 PROCEDURE — 83735 ASSAY OF MAGNESIUM: CPT | Performed by: EMERGENCY MEDICINE

## 2021-08-18 PROCEDURE — 25000003 PHARM REV CODE 250: Performed by: EMERGENCY MEDICINE

## 2021-08-18 PROCEDURE — 93005 ELECTROCARDIOGRAM TRACING: CPT

## 2021-08-18 PROCEDURE — 93010 ELECTROCARDIOGRAM REPORT: CPT | Mod: ,,, | Performed by: INTERNAL MEDICINE

## 2021-08-18 PROCEDURE — 85025 COMPLETE CBC W/AUTO DIFF WBC: CPT | Performed by: EMERGENCY MEDICINE

## 2021-08-18 PROCEDURE — U0002 COVID-19 LAB TEST NON-CDC: HCPCS | Performed by: EMERGENCY MEDICINE

## 2021-08-18 PROCEDURE — 84443 ASSAY THYROID STIM HORMONE: CPT | Performed by: EMERGENCY MEDICINE

## 2021-08-18 PROCEDURE — 82550 ASSAY OF CK (CPK): CPT | Performed by: EMERGENCY MEDICINE

## 2021-08-18 PROCEDURE — 36000 PLACE NEEDLE IN VEIN: CPT

## 2021-08-18 PROCEDURE — 80053 COMPREHEN METABOLIC PANEL: CPT | Performed by: EMERGENCY MEDICINE

## 2021-08-18 PROCEDURE — 93010 EKG 12-LEAD: ICD-10-PCS | Mod: ,,, | Performed by: INTERNAL MEDICINE

## 2021-08-18 PROCEDURE — 83690 ASSAY OF LIPASE: CPT | Performed by: EMERGENCY MEDICINE

## 2021-08-18 PROCEDURE — 63600175 PHARM REV CODE 636 W HCPCS: Performed by: EMERGENCY MEDICINE

## 2021-08-18 PROCEDURE — 99283 EMERGENCY DEPT VISIT LOW MDM: CPT | Mod: 25

## 2021-08-18 RX ORDER — KETOROLAC TROMETHAMINE 30 MG/ML
15 INJECTION, SOLUTION INTRAMUSCULAR; INTRAVENOUS
Status: COMPLETED | OUTPATIENT
Start: 2021-08-18 | End: 2021-08-18

## 2021-08-18 RX ADMIN — KETOROLAC TROMETHAMINE 15 MG: 30 INJECTION, SOLUTION INTRAMUSCULAR; INTRAVENOUS at 09:08

## 2021-08-18 RX ADMIN — SODIUM CHLORIDE 1000 ML: 0.9 INJECTION, SOLUTION INTRAVENOUS at 09:08

## 2022-03-11 RX ORDER — FLUOXETINE HYDROCHLORIDE 20 MG/1
20 CAPSULE ORAL DAILY
Qty: 30 CAPSULE | Refills: 3 | Status: SHIPPED | OUTPATIENT
Start: 2022-03-11 | End: 2024-02-14

## 2022-03-25 ENCOUNTER — HOSPITAL ENCOUNTER (EMERGENCY)
Facility: HOSPITAL | Age: 37
Discharge: HOME | End: 2022-03-25
Attending: EMERGENCY MEDICINE
Payer: MEDICAID

## 2022-03-25 VITALS
HEART RATE: 72 BPM | DIASTOLIC BLOOD PRESSURE: 75 MMHG | OXYGEN SATURATION: 96 % | SYSTOLIC BLOOD PRESSURE: 151 MMHG | RESPIRATION RATE: 18 BRPM | TEMPERATURE: 98 F

## 2022-03-25 DIAGNOSIS — S00.03XA CONTUSION OF SCALP, INITIAL ENCOUNTER: ICD-10-CM

## 2022-03-25 DIAGNOSIS — M25.551 RIGHT HIP PAIN: ICD-10-CM
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
## 2022-03-25 PROCEDURE — 96372 THER/PROPH/DIAG INJ SC/IM: CPT | Performed by: PHYSICIAN ASSISTANT

## 2022-03-25 PROCEDURE — 99285 EMERGENCY DEPT VISIT HI MDM: CPT | Mod: 25

## 2022-03-25 PROCEDURE — 63600175 PHARM REV CODE 636 W HCPCS: Performed by: PHYSICIAN ASSISTANT

## 2022-03-25 RX ORDER — METHOCARBAMOL 500 MG/1
500 TABLET, FILM COATED ORAL 3 TIMES DAILY
Qty: 15 TABLET | Refills: 0 | Status: SHIPPED | OUTPATIENT
Start: 2022-03-25 | End: 2022-03-30

## 2022-03-25 RX ORDER — KETOROLAC TROMETHAMINE 30 MG/ML
15 INJECTION, SOLUTION INTRAMUSCULAR; INTRAVENOUS
Status: COMPLETED | OUTPATIENT
Start: 2022-03-25 | End: 2022-03-25

## 2022-03-25 RX ORDER — KETOROLAC TROMETHAMINE 10 MG/1
10 TABLET, FILM COATED ORAL EVERY 6 HOURS
Qty: 20 TABLET | Refills: 0 | Status: SHIPPED | OUTPATIENT
Start: 2022-03-25 | End: 2022-03-30

## 2022-03-25 RX ORDER — ORPHENADRINE CITRATE 30 MG/ML
30 INJECTION INTRAMUSCULAR; INTRAVENOUS
Status: COMPLETED | OUTPATIENT
Start: 2022-03-25 | End: 2022-03-25

## 2022-03-25 RX ADMIN — KETOROLAC TROMETHAMINE 15 MG: 30 INJECTION, SOLUTION INTRAMUSCULAR; INTRAVENOUS at 01:03

## 2022-03-25 RX ADMIN — ORPHENADRINE CITRATE 30 MG: 30 INJECTION INTRAMUSCULAR; INTRAVENOUS at 01:03
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
## 2022-03-25 NOTE — DISCHARGE INSTRUCTIONS
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
## 2024-02-09 ENCOUNTER — ON-DEMAND VIRTUAL (OUTPATIENT)
Dept: URGENT CARE | Facility: CLINIC | Age: 39
End: 2024-02-09
Payer: MEDICAID

## 2024-02-09 ENCOUNTER — HOSPITAL ENCOUNTER (EMERGENCY)
Facility: HOSPITAL | Age: 39
Discharge: HOME | End: 2024-02-09
Attending: EMERGENCY MEDICINE
Payer: MEDICAID

## 2024-02-09 VITALS
SYSTOLIC BLOOD PRESSURE: 147 MMHG | OXYGEN SATURATION: 98 % | DIASTOLIC BLOOD PRESSURE: 83 MMHG | RESPIRATION RATE: 18 BRPM | TEMPERATURE: 97 F | HEART RATE: 86 BPM | BODY MASS INDEX: 28 KG/M2 | WEIGHT: 200 LBS | HEIGHT: 71 IN

## 2024-02-09 DIAGNOSIS — G89.29 CHRONIC MIDLINE LOW BACK PAIN WITH RIGHT-SIDED SCIATICA: ICD-10-CM

## 2024-02-09 DIAGNOSIS — M54.31 SCIATICA OF RIGHT SIDE: Primary | ICD-10-CM

## 2024-02-09 DIAGNOSIS — M54.40 LOW BACK PAIN WITH SCIATICA, SCIATICA LATERALITY UNSPECIFIED, UNSPECIFIED BACK PAIN LATERALITY, UNSPECIFIED CHRONICITY: Primary | ICD-10-CM

## 2024-02-09 DIAGNOSIS — M54.41 CHRONIC MIDLINE LOW BACK PAIN WITH RIGHT-SIDED SCIATICA: ICD-10-CM

## 2024-02-09 PROCEDURE — 63600175 PHARM REV CODE 636 W HCPCS: Performed by: NURSE PRACTITIONER

## 2024-02-09 PROCEDURE — 99285 EMERGENCY DEPT VISIT HI MDM: CPT | Mod: 25

## 2024-02-09 PROCEDURE — 96372 THER/PROPH/DIAG INJ SC/IM: CPT | Performed by: NURSE PRACTITIONER

## 2024-02-09 PROCEDURE — 25000003 PHARM REV CODE 250: Performed by: NURSE PRACTITIONER

## 2024-02-09 PROCEDURE — 99212 OFFICE O/P EST SF 10 MIN: CPT | Mod: 95,,, | Performed by: FAMILY MEDICINE

## 2024-02-09 RX ORDER — KETOROLAC TROMETHAMINE 30 MG/ML
30 INJECTION, SOLUTION INTRAMUSCULAR; INTRAVENOUS
Status: COMPLETED | OUTPATIENT
Start: 2024-02-09 | End: 2024-02-09

## 2024-02-09 RX ORDER — IBUPROFEN 800 MG/1
800 TABLET ORAL EVERY 6 HOURS PRN
Qty: 20 TABLET | Refills: 0 | Status: ON HOLD | OUTPATIENT
Start: 2024-02-09 | End: 2024-04-09 | Stop reason: HOSPADM

## 2024-02-09 RX ORDER — DEXAMETHASONE SODIUM PHOSPHATE 4 MG/ML
8 INJECTION, SOLUTION INTRA-ARTICULAR; INTRALESIONAL; INTRAMUSCULAR; INTRAVENOUS; SOFT TISSUE
Status: COMPLETED | OUTPATIENT
Start: 2024-02-09 | End: 2024-02-09

## 2024-02-09 RX ORDER — LIDOCAINE 50 MG/G
1 PATCH TOPICAL DAILY
Qty: 7 PATCH | Refills: 0 | Status: SHIPPED | OUTPATIENT
Start: 2024-02-09 | End: 2024-02-16

## 2024-02-09 RX ORDER — METHOCARBAMOL 500 MG/1
1000 TABLET, FILM COATED ORAL
Status: COMPLETED | OUTPATIENT
Start: 2024-02-09 | End: 2024-02-09

## 2024-02-09 RX ORDER — MORPHINE SULFATE 4 MG/ML
4 INJECTION, SOLUTION INTRAMUSCULAR; INTRAVENOUS
Status: COMPLETED | OUTPATIENT
Start: 2024-02-09 | End: 2024-02-09

## 2024-02-09 RX ORDER — METHOCARBAMOL 750 MG/1
750 TABLET, FILM COATED ORAL 3 TIMES DAILY
Qty: 15 TABLET | Refills: 0 | Status: SHIPPED | OUTPATIENT
Start: 2024-02-09 | End: 2024-02-14

## 2024-02-09 RX ADMIN — METHOCARBAMOL TABLETS 1000 MG: 500 TABLET, COATED ORAL at 11:02

## 2024-02-09 RX ADMIN — DEXAMETHASONE SODIUM PHOSPHATE 8 MG: 4 INJECTION, SOLUTION INTRA-ARTICULAR; INTRALESIONAL; INTRAMUSCULAR; INTRAVENOUS; SOFT TISSUE at 11:02

## 2024-02-09 RX ADMIN — KETOROLAC TROMETHAMINE 30 MG: 30 INJECTION, SOLUTION INTRAMUSCULAR; INTRAVENOUS at 11:02

## 2024-02-09 RX ADMIN — MORPHINE SULFATE 4 MG: 4 INJECTION INTRAVENOUS at 12:02

## 2024-02-09 NOTE — PROGRESS NOTES
Subjective:      Patient ID: Daniel Behlar is a 38 y.o. male.    Vitals:  vitals were not taken for this visit.     Chief Complaint: Back Pain (Back pain)      Visit Type: TELE AUDIOVISUAL    Present with the patient at the time of consultation: TELEMED PRESENT WITH PATIENT: None    Past Medical History:   Diagnosis Date    Bipolar 1 disorder     PTSD (post-traumatic stress disorder)      Past Surgical History:   Procedure Laterality Date    ESOPHAGOGASTRODUODENOSCOPY      LAPAROSCOPIC CHOLECYSTECTOMY N/A 5/28/2020    Procedure: CHOLECYSTECTOMY, LAPAROSCOPIC;  Surgeon: Eduardo Bolden MD;  Location: [facility];  Service: General;  Laterality: N/A;     Review of patient's allergies indicates:  No Known Allergies  Current Outpatient Medications on File Prior to Visit   Medication Sig Dispense Refill    FLUoxetine 20 MG capsule Take 1 capsule (20 mg total) by mouth once daily. 30 capsule 3    HYDROcodone-acetaminophen (NORCO)  mg per tablet Take 1 tablet by mouth every 6 (six) hours as needed for Pain. (Patient not taking: Reported on 5/19/2021) 12 tablet 0    hydrOXYzine (ATARAX) 50 MG tablet Take 50 mg by mouth every evening.      hydrOXYzine HCL (ATARAX) 25 MG tablet Take 25 mg by mouth 3 (three) times daily as needed for Itching.      LIDOcaine (LIDODERM) 5 % Place 1 patch onto the skin once daily. Remove & Discard patch within 12 hours or as directed by MD 30 patch 0    mirtazapine (REMERON SOL-TAB) 15 MG disintegrating tablet Take 15 mg by mouth as needed.      mirtazapine (REMERON) 30 MG tablet Take 30 mg by mouth every evening.      mupirocin (BACTROBAN) 2 % ointment Apply to affected area 3 times daily (Patient not taking: Reported on 5/19/2021) 22 g 0    pantoprazole (PROTONIX) 40 MG tablet Take 1 tablet (40 mg total) by mouth once daily. 30 tablet 3    prazosin (MINIPRESS) 2 MG Cap Take 2 mg by mouth every evening.      QUEtiapine (SEROQUEL) 200 MG Tab Take 200 mg by mouth every evening.       traZODone (DESYREL) 50 MG tablet Take 1 tablet (50 mg total) by mouth every evening. 30 tablet 3     No current facility-administered medications on file prior to visit.     Family History   Problem Relation Age of Onset    No Known Problems Mother     No Known Problems Father     No Known Problems Sister     No Known Problems Brother            Ohs Peq Odvv Intake    2/9/2024  9:39 AM CST - Filed by Patient   What is your current physical address in the event of a medical emergency? [address]   Are you able to take your vital signs? No   Please attach any relevant images or files          37 yo male with 2 weeks of back pain now severe and 9/10.  Tylenol and motrin are not working  No incontinence of stool or urine  Is visibly walking outside during interview.  Would like a referral to physical therapy.    Back Pain      Musculoskeletal:  Positive for back pain.      Objective:   The physical exam was conducted virtually.  Physical Exam   Constitutional: He does not appear ill. No distress.   Pulmonary/Chest: Effort normal. No respiratory distress.   Neurological: He is alert.     Assessment:     1. Low back pain with sciatica, sciatica laterality unspecified, unspecified back pain laterality, unspecified chronicity        Plan:       Low back pain with sciatica, sciatica laterality unspecified, unspecified back pain laterality, unspecified chronicity        Please follow up with your PCP

## 2024-02-09 NOTE — DISCHARGE INSTRUCTIONS

## 2024-02-09 NOTE — Clinical Note
"Daniel "Daniel" Behlar was seen and treated in our emergency department on 2/9/2024.  He may return to work on 02/12/2024.       If you have any questions or concerns, please don't hesitate to call.      Bianca Pierre NP"

## 2024-02-09 NOTE — ED PROVIDER NOTES
Encounter Date: 2/9/2024       History     Chief Complaint   Patient presents with    Back Pain     Right lower back pain that radiates down right leg, hx of sciatica, walks with steady gait,     38-year-old male presents emergency room with reports of lower back pain specifically to the right lower back with radiation down the leg.  Patient reports that he has a past medical history of sciatic nerve issues.  He reports that he has been driving more frequently and when he is approximately 10 miles into driving, the pain begins to worsen.  He denies any bowel or bladder loss, denies numbness or tingling.  Denies any testicular pain or swelling.  Patient has a past medical history of bipolar and PTSD.  Denies taking any medication for symptom control outside of over-the-counter ibuprofen.    The history is provided by the patient. No  was used.     Review of patient's allergies indicates:  No Known Allergies  Past Medical History:   Diagnosis Date    Bipolar 1 disorder     PTSD (post-traumatic stress disorder)      Past Surgical History:   Procedure Laterality Date    ESOPHAGOGASTRODUODENOSCOPY      LAPAROSCOPIC CHOLECYSTECTOMY N/A 5/28/2020    Procedure: CHOLECYSTECTOMY, LAPAROSCOPIC;  Surgeon: Eduardo Bloden MD;  Location: [facility];  Service: General;  Laterality: N/A;     Family History   Problem Relation Age of Onset    No Known Problems Mother     No Known Problems Father     No Known Problems Sister     No Known Problems Brother      Social History     Tobacco Use    Smoking status: Some Days     Current packs/day: 0.25     Average packs/day: 0.3 packs/day for 25.1 years (6.3 ttl pk-yrs)     Types: Cigarettes     Start date: 1999    Smokeless tobacco: Never    Tobacco comments:     Ambulatory referral to Smoking Cessation program.    Substance Use Topics    Alcohol use: Not Currently    Drug use: Not Currently     Frequency: 2.0 times per week     Review of Systems   Musculoskeletal:   Positive for back pain.   All other systems reviewed and are negative.      Physical Exam     Initial Vitals [02/09/24 1110]   BP Pulse Resp Temp SpO2   (!) 144/84 94 18 97.4 °F (36.3 °C) 100 %      MAP       --         Physical Exam    Constitutional: He appears well-developed and well-nourished. He is not diaphoretic. No distress.   HENT:   Head: Normocephalic and atraumatic.   Right Ear: Hearing and tympanic membrane normal.   Left Ear: Hearing and tympanic membrane normal.   Nose: Nose normal.   Mouth/Throat: Uvula is midline, oropharynx is clear and moist and mucous membranes are normal.   Eyes: Lids are normal. Pupils are equal, round, and reactive to light.   Cardiovascular:  Normal rate.           Pulmonary/Chest: Effort normal and breath sounds normal. No respiratory distress. He has no wheezes. He has no rhonchi.   Abdominal: Abdomen is soft. There is no abdominal tenderness.   Musculoskeletal:         General: Normal range of motion.      Cervical back: No rigidity.      Comments: Mild lumbar paraspinal tenderness, no step offs. Decrease in SLR     Neurological: He is oriented to person, place, and time.   Skin: Skin is warm and dry. No rash noted.   Psychiatric: He has a normal mood and affect. His behavior is normal. Judgment and thought content normal.         ED Course   Procedures  Labs Reviewed - No data to display       Imaging Results              CT Lumbar Spine Without Contrast (Final result)  Result time 02/09/24 13:15:27      Final result by Rakan Waldrop MD (02/09/24 13:15:27)                   Impression:      1. Degenerative disc disease most advanced at L5-S1. Right central disc protrusion with associated posterior osteophyte formation contributes to mild-to-moderate narrowing of the right lateral spinal canal. Narrowing of the right subarticular recess is noted, with suspected impingement of the traversing right S1 nerve root sheath, not well resolved by CT technique.  2. Additional  details, as provided in the body of the report.      Electronically signed by: Rakan Waldrop  Date:    02/09/2024  Time:    13:15               Narrative:    EXAMINATION:  CT LUMBAR SPINE WITHOUT CONTRAST    CLINICAL HISTORY:  Low back pain, cauda equina syndrome suspected;    TECHNIQUE:  Low-dose axial, sagittal and coronal reformations are obtained through the lumbar spine.  Contrast was not administered.    COMPARISON:  Lumbar spine radiograph 03/25/2022.    FINDINGS:    Fractures: No acute lumbar spine fracture is present.    Alignment: There is no significant vertebral subluxation.    Discs: Degenerative changes most advanced in the mid and lower lumbar spine.    Vertebral bodies: Vertebral bodies are normal in height and attenuation.    Paraspinal soft tissues: Unremarkable.    Although CT is inferior to MRI in the evaluation of spinal canal and foraminal soft tissues, level-wise findings are as follows:    L1-L2: There is no significant spinal canal or foraminal stenosis.    L2-L3: There is no significant spinal canal or foraminal stenosis.    L3-L4: There is no significant spinal canal or foraminal stenosis. Mild ligamentum flavum thickening.    L4-L5: There is no significant spinal canal or foraminal stenosis. Shallow diffuse disc bulging.  Mild ligamentum flavum thickening.    L5-S1: Right central disc protrusion with associated posterior osteophyte formation contributes to mild-to-moderate narrowing of the right lateral spinal canal.  Mild ligamentum flavum thickening.  Narrowing of the right subarticular recess is noted, with suspected impingement of the traversing right S1 nerve root sheath, not well resolved by CT technique.  No significant foraminal narrowing.    Imaged sacroiliac joints: Mild degenerative change.    Imaged abdomen, pelvis, and retroperitoneum: Mild aortoiliac atherosclerotic calcifications.  Colonic diverticulosis.                                       Medications   ketorolac  injection 30 mg (30 mg Intramuscular Given 2/9/24 1132)   dexAMETHasone injection 8 mg (8 mg Intramuscular Given 2/9/24 1134)   methocarbamoL tablet 1,000 mg (1,000 mg Oral Given 2/9/24 1132)   morphine injection 4 mg (4 mg Intramuscular Given 2/9/24 1204)     Medical Decision Making  Differential Diagnosis includes, but is not limited to:  Cauda equina syndrome, diskitis/osteomyelitis, epidural/paraspinal abscess, AAA, aortic dissection, post-op/hardware infection, trauma/vertebral fracture, spinal cord injury, disc herniation, spinal stenosis, sciatica, radiculopathy, neoplasm, lumbar muscle strain, muscle spasm, neuropathic pain, UTI/pyelonephritis, nephrolithiasis.     Amount and/or Complexity of Data Reviewed  Radiology: ordered.    Risk  Prescription drug management.               ED Course as of 02/09/24 1648   Fri Feb 09, 2024   1218 Pt states he is having worsening of back pain and is in tears. Pt states he needs something stronger for the pain [DT]   1328 FINDINGS:     Fractures: No acute lumbar spine fracture is present.     Alignment: There is no significant vertebral subluxation.     Discs: Degenerative changes most advanced in the mid and lower lumbar spine.     Vertebral bodies: Vertebral bodies are normal in height and attenuation.     Paraspinal soft tissues: Unremarkable.     Although CT is inferior to MRI in the evaluation of spinal canal and foraminal soft tissues, level-wise findings are as follows:     L1-L2: There is no significant spinal canal or foraminal stenosis.     L2-L3: There is no significant spinal canal or foraminal stenosis.     L3-L4: There is no significant spinal canal or foraminal stenosis. Mild ligamentum flavum thickening.     L4-L5: There is no significant spinal canal or foraminal stenosis. Shallow diffuse disc bulging.  Mild ligamentum flavum thickening.     L5-S1: Right central disc protrusion with associated posterior osteophyte formation contributes to mild-to-moderate  narrowing of the right lateral spinal canal.  Mild ligamentum flavum thickening.  Narrowing of the right subarticular recess is noted, with suspected impingement of the traversing right S1 nerve root sheath, not well resolved by CT technique.  No significant foraminal narrowing.     Imaged sacroiliac joints: Mild degenerative change.     Imaged abdomen, pelvis, and retroperitoneum: Mild aortoiliac atherosclerotic calcifications.  Colonic diverticulosis.     Impression:     1. Degenerative disc disease most advanced at L5-S1. Right central disc protrusion with associated posterior osteophyte formation contributes to mild-to-moderate narrowing of the right lateral spinal canal. Narrowing of the right subarticular recess is noted, with suspected impingement of the traversing right S1 nerve root sheath, not well resolved by CT technique.  2. Additional details, as provided in the body of the report.      [DT]   1404 Secure chat sent to Dr Sheffield. States he will have the pt scheduled to be seen in clinic with MRI. PT will be sent home with meds for pain control otherwise is stable at this time for dc.  [DT]   1415 PT reports much improvement in pain control and is able to ambulate around in nad.  [DT]      ED Course User Index  [DT] Bianca Pierre NP                           Clinical Impression:  Final diagnoses:  [M54.31] Sciatica of right side (Primary)  [M54.41, G89.29] Chronic midline low back pain with right-sided sciatica          ED Disposition Condition    Discharge Stable          ED Prescriptions       Medication Sig Dispense Start Date End Date Auth. Provider    methocarbamoL (ROBAXIN) 750 MG Tab Take 1 tablet (750 mg total) by mouth 3 (three) times daily. for 5 days 15 tablet 2/9/2024 2/14/2024 Bianca Pierre NP    LIDOcaine (LIDODERM) 5 % Place 1 patch onto the skin once daily. Remove & Discard patch within 12 hours or as directed by MD for 7 days 7 patch 2/9/2024 2/16/2024 Bianca Pierre NP     ibuprofen (ADVIL,MOTRIN) 800 MG tablet Take 1 tablet (800 mg total) by mouth every 6 (six) hours as needed for Pain. 20 tablet 2/9/2024 -- Bianca Pierre NP          Follow-up Information       Follow up With Specialties Details Why Contact Info    Morro Sheffield MD Neurosurgery Schedule an appointment as soon as possible for a visit in 1 week  [address] [address]  [phone]               Bianca Pierre NP  02/09/24 1645       Bianca Pierre NP  02/09/24 1649

## 2024-02-09 NOTE — ED TRIAGE NOTES
Chronic low back pain with sciatica. States flared up last week and has been unable to relieve with NSAIDs and exercises. Presents in moderate distress due to pain, unable to find comfortable position.

## 2024-02-10 ENCOUNTER — HOSPITAL ENCOUNTER (EMERGENCY)
Facility: HOSPITAL | Age: 39
Discharge: HOME | End: 2024-02-10
Attending: EMERGENCY MEDICINE
Payer: MEDICAID

## 2024-02-10 VITALS
DIASTOLIC BLOOD PRESSURE: 92 MMHG | RESPIRATION RATE: 18 BRPM | BODY MASS INDEX: 27.89 KG/M2 | TEMPERATURE: 98 F | WEIGHT: 200 LBS | SYSTOLIC BLOOD PRESSURE: 149 MMHG | OXYGEN SATURATION: 96 % | HEART RATE: 76 BPM

## 2024-02-10 DIAGNOSIS — M54.31 SCIATICA OF RIGHT SIDE: Primary | ICD-10-CM

## 2024-02-10 PROCEDURE — 63600175 PHARM REV CODE 636 W HCPCS: Performed by: EMERGENCY MEDICINE

## 2024-02-10 PROCEDURE — 99284 EMERGENCY DEPT VISIT MOD MDM: CPT | Mod: 25

## 2024-02-10 PROCEDURE — 96372 THER/PROPH/DIAG INJ SC/IM: CPT | Performed by: EMERGENCY MEDICINE

## 2024-02-10 RX ORDER — OXYCODONE AND ACETAMINOPHEN 10; 325 MG/1; MG/1
1 TABLET ORAL EVERY 6 HOURS PRN
Qty: 12 TABLET | Refills: 0 | Status: SHIPPED | OUTPATIENT
Start: 2024-02-10 | End: 2024-02-14 | Stop reason: SDUPTHER

## 2024-02-10 RX ORDER — HYDROMORPHONE HYDROCHLORIDE 1 MG/ML
1 INJECTION, SOLUTION INTRAMUSCULAR; INTRAVENOUS; SUBCUTANEOUS
Status: COMPLETED | OUTPATIENT
Start: 2024-02-10 | End: 2024-02-10

## 2024-02-10 RX ADMIN — HYDROMORPHONE HYDROCHLORIDE 1 MG: 1 INJECTION, SOLUTION INTRAMUSCULAR; INTRAVENOUS; SUBCUTANEOUS at 07:02

## 2024-02-11 NOTE — ED PROVIDER NOTES
Encounter Date: 2/10/2024       History     Chief Complaint   Patient presents with    Hip Pain     C/o(R) hip/leg pain from sciatica. Exacerbation x2 weeks. Pt. Was seen here yesterday for same. Discharged after medications and given follow-up.      Patient is a 39 yo male who complains of severe pain from his right hip radiating down his right leg.  He has a history of sciatica.  Patient was seen here yesterday for the same and placed on Robaxin and ibuprofen.  He reports no relief in his pain and was unable to sleep last night.  He denies leg weakness or numbness.  No bowel or bladder incontinence.      Review of patient's allergies indicates:  No Known Allergies  Past Medical History:   Diagnosis Date    Bipolar 1 disorder     PTSD (post-traumatic stress disorder)      Past Surgical History:   Procedure Laterality Date    ESOPHAGOGASTRODUODENOSCOPY      LAPAROSCOPIC CHOLECYSTECTOMY N/A 5/28/2020    Procedure: CHOLECYSTECTOMY, LAPAROSCOPIC;  Surgeon: Eduardo Bolden MD;  [facility];  Service: General;  Laterality: N/A;     Family History   Problem Relation Age of Onset    No Known Problems Mother     No Known Problems Father     No Known Problems Sister     No Known Problems Brother      Social History     Tobacco Use    Smoking status: Some Days     Current packs/day: 0.25     Average packs/day: 0.3 packs/day for 25.1 years (6.3 ttl pk-yrs)     Types: Cigarettes     Start date: 1999    Smokeless tobacco: Never    Tobacco comments:     Ambulatory referral to Smoking Cessation program.    Substance Use Topics    Alcohol use: Not Currently    Drug use: Not Currently     Frequency: 2.0 times per week     Review of Systems   Genitourinary:  Negative for difficulty urinating.   Musculoskeletal:         Right hip pain.  Right leg pain.   Neurological:  Negative for weakness and numbness.       Physical Exam     Initial Vitals [02/10/24 1709]   BP Pulse Resp Temp SpO2   (!) 130/91 79 20 98.5 °F (36.9 °C) 98 %       MAP       --         Physical Exam    Nursing note and vitals reviewed.  Constitutional: He appears distressed.   Cardiovascular:  Normal rate, regular rhythm and normal heart sounds.           Abdominal: Abdomen is soft. There is no abdominal tenderness.   Musculoskeletal:         General: No edema. Normal range of motion.     Neurological: He is alert and oriented to person, place, and time.   Skin: Skin is warm and dry.   Psychiatric: His behavior is normal.         ED Course   Procedures  Labs Reviewed - No data to display       Imaging Results    None          Medications   HYDROmorphone injection 1 mg (1 mg Intramuscular Given 2/10/24 1930)     Medical Decision Making  Emergency evaluation of a 38-year-old male with severe pain from his right hip into his right leg.  He was seen here yesterday and says the medication he was prescribed is not helping.  He was unable to sleep last night due to the pain.  An ambulatory referral was placed to Neurosurgery for follow-up.  Patient was given an intramuscular shot of Dilaudid here in the ED. I will place him on a short 3 day course of Percocet for severe pain.  He should return if condition worsens.    Risk  Prescription drug management.                                      Clinical Impression:  Final diagnoses:  [M54.31] Sciatica of right side (Primary)          ED Disposition Condition    Discharge Stable          ED Prescriptions       Medication Sig Dispense Start Date End Date Auth. Provider    oxyCODONE-acetaminophen (PERCOCET)  mg per tablet Take 1 tablet by mouth every 6 (six) hours as needed for Pain. 12 tablet 2/10/2024 -- Andrew Curtis MD          Follow-up Information       Follow up With Specialties Details Why Contact Info    Neurosurgeon as scheduled        [facility] [place] [facility] Emergency Medicine  If symptoms worsen [address] [facility] [address] [facility] [address] [phone]             Andrew Curtis  MD  02/10/24 [id number]

## 2024-02-12 ENCOUNTER — TELEPHONE (OUTPATIENT)
Dept: NEUROSURGERY | Facility: CLINIC | Age: 39
End: 2024-02-12
Payer: MEDICAID

## 2024-02-12 DIAGNOSIS — M48.07 SPINAL STENOSIS, LUMBOSACRAL REGION: Primary | ICD-10-CM

## 2024-02-12 NOTE — TELEPHONE ENCOUNTER
Returned pt's call, informed pt that 2/26 is the soonest appointment that we have available but if someone cancels before then, I will give him a call.  Pt voiced understanding

## 2024-02-14 ENCOUNTER — OFFICE VISIT (OUTPATIENT)
Dept: FAMILY MEDICINE | Facility: HOSPITAL | Age: 39
End: 2024-02-14
Payer: MEDICAID

## 2024-02-14 VITALS
HEIGHT: 71 IN | WEIGHT: 206.56 LBS | OXYGEN SATURATION: 97 % | DIASTOLIC BLOOD PRESSURE: 81 MMHG | BODY MASS INDEX: 28.92 KG/M2 | SYSTOLIC BLOOD PRESSURE: 125 MMHG | HEART RATE: 69 BPM

## 2024-02-14 DIAGNOSIS — Z13.6 ENCOUNTER FOR SCREENING FOR CARDIOVASCULAR DISORDERS: ICD-10-CM

## 2024-02-14 DIAGNOSIS — Z13.1 ENCOUNTER FOR SCREENING FOR DIABETES MELLITUS: ICD-10-CM

## 2024-02-14 DIAGNOSIS — Z11.59 ENCOUNTER FOR HEPATITIS C SCREENING TEST FOR LOW RISK PATIENT: ICD-10-CM

## 2024-02-14 DIAGNOSIS — G89.29 CHRONIC BILATERAL LOW BACK PAIN WITH RIGHT-SIDED SCIATICA: Primary | ICD-10-CM

## 2024-02-14 DIAGNOSIS — M54.41 CHRONIC BILATERAL LOW BACK PAIN WITH RIGHT-SIDED SCIATICA: Primary | ICD-10-CM

## 2024-02-14 DIAGNOSIS — M54.31 SCIATICA OF RIGHT SIDE: ICD-10-CM

## 2024-02-14 PROCEDURE — 96372 THER/PROPH/DIAG INJ SC/IM: CPT

## 2024-02-14 PROCEDURE — 99213 OFFICE O/P EST LOW 20 MIN: CPT | Mod: 25 | Performed by: STUDENT IN AN ORGANIZED HEALTH CARE EDUCATION/TRAINING PROGRAM

## 2024-02-14 RX ORDER — GABAPENTIN 300 MG/1
300 CAPSULE ORAL 3 TIMES DAILY
Qty: 36 CAPSULE | Refills: 0 | Status: SHIPPED | OUTPATIENT
Start: 2024-02-14 | End: 2024-02-26

## 2024-02-14 RX ORDER — KETOROLAC TROMETHAMINE 30 MG/ML
30 INJECTION, SOLUTION INTRAMUSCULAR; INTRAVENOUS
Status: COMPLETED | OUTPATIENT
Start: 2024-02-14 | End: 2024-02-14

## 2024-02-14 RX ORDER — KETOROLAC TROMETHAMINE 30 MG/ML
15 INJECTION, SOLUTION INTRAMUSCULAR; INTRAVENOUS
Status: DISCONTINUED | OUTPATIENT
Start: 2024-02-14 | End: 2024-02-14

## 2024-02-14 RX ORDER — OXYCODONE AND ACETAMINOPHEN 10; 325 MG/1; MG/1
1 TABLET ORAL EVERY 6 HOURS PRN
Qty: 48 TABLET | Refills: 0 | Status: SHIPPED | OUTPATIENT
Start: 2024-02-14 | End: 2024-02-26 | Stop reason: SDUPTHER

## 2024-02-14 RX ORDER — OXYCODONE AND ACETAMINOPHEN 10; 325 MG/1; MG/1
1 TABLET ORAL EVERY 6 HOURS PRN
Qty: 48 TABLET | Refills: 0 | Status: SHIPPED | OUTPATIENT
Start: 2024-02-14 | End: 2024-02-14

## 2024-02-14 RX ADMIN — KETOROLAC TROMETHAMINE 30 MG: 30 INJECTION INTRAMUSCULAR; INTRAVENOUS at 09:02
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
## 2024-02-15 ENCOUNTER — TELEPHONE (OUTPATIENT)
Dept: FAMILY MEDICINE | Facility: HOSPITAL | Age: 39
End: 2024-02-15
Payer: MEDICAID

## 2024-02-15 NOTE — PROGRESS NOTES
I assume primary medical responsibility for this patient. I have reviewed the history, physical, and assessement & treatment plan with the resident and agree that the care is reasonable and necessary. This service has been performed by a resident without the presence of a teaching physician under the primary care exception. If necessary, an addendum of additional findings or evaluation beyond the resident documentation will be noted below.    Strict ER precautions given. -Aware reviewed. No irregularities noted. Patient to be given short course of opioids until seen by Neurosurgery and this medication will not be prescribed long-term from this clinic. Patient expressed understanding.     Naye Osborn MD    [facility]

## 2024-02-15 NOTE — TELEPHONE ENCOUNTER
Called pharmacy and gave diagnosis code, pharmacy will contact patient    ----- Message from Ana Maria Mckeon MA sent at 2/14/2024  4:28 PM CST -----  Contact: Pt    ----- Message -----  From: George Lyons  Sent: 2/14/2024   3:41 PM CST  To: Jadiel Neal Staff    .Type:  Needs Medical Advice    Who Called: pt wife    Pharm[facility] and phone #[id number] - [place] - [address] AT [facility]   Phone: [phone]  Fax: [phone]  Would the patient rather a call back or a response via [facility]?  Call back  Best Call Back Number: [phone]  Additional Information:  Pt. Wife is calling regarding the medication    oxyCODONE-acetaminophen (PERCOCET)  mg per table.  Pharmacy has been trying to reach the office regarding the diagnosis code.

## 2024-02-19 ENCOUNTER — HOSPITAL ENCOUNTER (OUTPATIENT)
Dept: RADIOLOGY | Facility: HOSPITAL | Age: 39
Discharge: HOME | End: 2024-02-19
Attending: NEUROLOGICAL SURGERY
Payer: MEDICAID

## 2024-02-19 DIAGNOSIS — M48.07 SPINAL STENOSIS, LUMBOSACRAL REGION: ICD-10-CM

## 2024-02-19 PROCEDURE — 72148 MRI LUMBAR SPINE W/O DYE: CPT | Mod: 26,,, | Performed by: RADIOLOGY

## 2024-02-19 PROCEDURE — 72148 MRI LUMBAR SPINE W/O DYE: CPT | Mod: TC

## 2024-02-23 ENCOUNTER — TELEPHONE (OUTPATIENT)
Dept: NEUROSURGERY | Facility: CLINIC | Age: 39
End: 2024-02-23
Payer: MEDICAID

## 2024-02-23 DIAGNOSIS — M48.07 SPINAL STENOSIS, LUMBOSACRAL REGION: Primary | ICD-10-CM

## 2024-02-26 ENCOUNTER — OFFICE VISIT (OUTPATIENT)
Dept: NEUROSURGERY | Facility: CLINIC | Age: 39
End: 2024-02-26
Payer: MEDICAID

## 2024-02-26 ENCOUNTER — HOSPITAL ENCOUNTER (OUTPATIENT)
Dept: RADIOLOGY | Facility: HOSPITAL | Age: 39
Discharge: HOME | End: 2024-02-26
Attending: NEUROLOGICAL SURGERY
Payer: MEDICAID

## 2024-02-26 VITALS
HEART RATE: 76 BPM | BODY MASS INDEX: 28.92 KG/M2 | SYSTOLIC BLOOD PRESSURE: 137 MMHG | WEIGHT: 206.56 LBS | HEIGHT: 71 IN | DIASTOLIC BLOOD PRESSURE: 86 MMHG

## 2024-02-26 DIAGNOSIS — M48.07 SPINAL STENOSIS, LUMBOSACRAL REGION: ICD-10-CM

## 2024-02-26 DIAGNOSIS — M54.41 CHRONIC BILATERAL LOW BACK PAIN WITH RIGHT-SIDED SCIATICA: ICD-10-CM

## 2024-02-26 DIAGNOSIS — M54.41 CHRONIC MIDLINE LOW BACK PAIN WITH RIGHT-SIDED SCIATICA: ICD-10-CM

## 2024-02-26 DIAGNOSIS — M54.31 SCIATICA OF RIGHT SIDE: ICD-10-CM

## 2024-02-26 DIAGNOSIS — G89.29 CHRONIC MIDLINE LOW BACK PAIN WITH RIGHT-SIDED SCIATICA: ICD-10-CM

## 2024-02-26 DIAGNOSIS — G89.29 CHRONIC BILATERAL LOW BACK PAIN WITH RIGHT-SIDED SCIATICA: ICD-10-CM

## 2024-02-26 PROCEDURE — 99213 OFFICE O/P EST LOW 20 MIN: CPT | Mod: PBBFAC,25,PN | Performed by: NEUROLOGICAL SURGERY

## 2024-02-26 PROCEDURE — 72110 X-RAY EXAM L-2 SPINE 4/>VWS: CPT | Mod: 26,59,, | Performed by: RADIOLOGY

## 2024-02-26 PROCEDURE — 72082 X-RAY EXAM ENTIRE SPI 2/3 VW: CPT | Mod: TC,PN

## 2024-02-26 PROCEDURE — 99204 OFFICE O/P NEW MOD 45 MIN: CPT | Mod: S$PBB,,, | Performed by: NEUROLOGICAL SURGERY

## 2024-02-26 PROCEDURE — 3008F BODY MASS INDEX DOCD: CPT | Mod: CPTII,,, | Performed by: NEUROLOGICAL SURGERY

## 2024-02-26 PROCEDURE — 3079F DIAST BP 80-89 MM HG: CPT | Mod: CPTII,,, | Performed by: NEUROLOGICAL SURGERY

## 2024-02-26 PROCEDURE — 1159F MED LIST DOCD IN RCRD: CPT | Mod: CPTII,,, | Performed by: NEUROLOGICAL SURGERY

## 2024-02-26 PROCEDURE — 72082 X-RAY EXAM ENTIRE SPI 2/3 VW: CPT | Mod: 26,,, | Performed by: RADIOLOGY

## 2024-02-26 PROCEDURE — 3075F SYST BP GE 130 - 139MM HG: CPT | Mod: CPTII,,, | Performed by: NEUROLOGICAL SURGERY

## 2024-02-26 PROCEDURE — 99999 PR PBB SHADOW E&M-EST. PATIENT-LVL III: CPT | Mod: PBBFAC,,, | Performed by: NEUROLOGICAL SURGERY

## 2024-02-26 PROCEDURE — 72110 X-RAY EXAM L-2 SPINE 4/>VWS: CPT | Mod: TC,59,PN

## 2024-02-26 RX ORDER — PREGABALIN 50 MG/1
50 CAPSULE ORAL 3 TIMES DAILY
Qty: 90 CAPSULE | Refills: 0 | Status: SHIPPED | OUTPATIENT
Start: 2024-02-26 | End: 2024-03-03

## 2024-02-26 RX ORDER — OXYCODONE AND ACETAMINOPHEN 10; 325 MG/1; MG/1
1 TABLET ORAL EVERY 6 HOURS PRN
Qty: 48 TABLET | Refills: 0 | Status: SHIPPED | OUTPATIENT
Start: 2024-02-26 | End: 2024-03-03 | Stop reason: SDUPTHER
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
## 2024-02-27 ENCOUNTER — TELEPHONE (OUTPATIENT)
Dept: NEUROSURGERY | Facility: CLINIC | Age: 39
End: 2024-02-27
Payer: MEDICAID

## 2024-02-28 ENCOUNTER — TELEPHONE (OUTPATIENT)
Dept: NEUROSURGERY | Facility: CLINIC | Age: 39
End: 2024-02-28
Payer: MEDICAID

## 2024-02-28 NOTE — TELEPHONE ENCOUNTER
Good Morning,  This patient is scheduled to have a lumbar microdiscectomy with  on March 26, 2024. He will need to be cleared for surgery, he will need the following test completed    CBC  CMP  PT/PTT  Urinalysis    EKG  Chest Xray    Thanks,  MT

## 2024-02-28 NOTE — TELEPHONE ENCOUNTER
Returned pt's call, I stated to pt that I was calling to get him scheduled for surgery. I scheduled pt's surgery for March 26, 2024. Pt stated that he will call back if he needs to change the date. I stated to pt that is fine. Pt voiced understanding    2 = A lot of assistance

## 2024-03-01 ENCOUNTER — PATIENT MESSAGE (OUTPATIENT)
Dept: NEUROSURGERY | Facility: CLINIC | Age: 39
End: 2024-03-01
Payer: MEDICAID

## 2024-03-01 ENCOUNTER — TELEPHONE (OUTPATIENT)
Dept: NEUROSURGERY | Facility: CLINIC | Age: 39
End: 2024-03-01
Payer: MEDICAID

## 2024-03-01 DIAGNOSIS — M48.07 SPINAL STENOSIS, LUMBOSACRAL REGION: Primary | ICD-10-CM

## 2024-03-03 DIAGNOSIS — F45.41 PAIN AGGRAVATED BY ANXIETY: ICD-10-CM

## 2024-03-03 DIAGNOSIS — M54.31 SCIATICA OF RIGHT SIDE: ICD-10-CM

## 2024-03-03 DIAGNOSIS — F41.9 PAIN AGGRAVATED BY ANXIETY: ICD-10-CM

## 2024-03-03 DIAGNOSIS — F45.41 PAIN AGGRAVATED BY ANXIETY: Primary | ICD-10-CM

## 2024-03-03 DIAGNOSIS — M54.41 CHRONIC BILATERAL LOW BACK PAIN WITH RIGHT-SIDED SCIATICA: ICD-10-CM

## 2024-03-03 DIAGNOSIS — G89.29 CHRONIC BILATERAL LOW BACK PAIN WITH RIGHT-SIDED SCIATICA: ICD-10-CM

## 2024-03-03 DIAGNOSIS — F41.9 PAIN AGGRAVATED BY ANXIETY: Primary | ICD-10-CM

## 2024-03-03 RX ORDER — PREGABALIN 100 MG/1
100 CAPSULE ORAL 3 TIMES DAILY
Qty: 90 CAPSULE | Refills: 5 | Status: SHIPPED | OUTPATIENT
Start: 2024-03-03 | End: 2024-03-25 | Stop reason: SDUPTHER

## 2024-03-03 RX ORDER — DIAZEPAM 5 MG/1
5 TABLET ORAL EVERY 8 HOURS PRN
Qty: 21 TABLET | Refills: 0 | Status: SHIPPED | OUTPATIENT
Start: 2024-03-03 | End: 2024-03-19 | Stop reason: SDUPTHER

## 2024-03-03 RX ORDER — OXYCODONE AND ACETAMINOPHEN 10; 325 MG/1; MG/1
1 TABLET ORAL EVERY 6 HOURS PRN
Qty: 48 TABLET | Refills: 0 | Status: SHIPPED | OUTPATIENT
Start: 2024-03-03 | End: 2024-03-17 | Stop reason: SDUPTHER

## 2024-03-03 RX ORDER — DIAZEPAM 5 MG/1
5 TABLET ORAL EVERY 8 HOURS PRN
Qty: 21 TABLET | Refills: 0 | Status: SHIPPED | OUTPATIENT
Start: 2024-03-03 | End: 2024-03-03 | Stop reason: SDUPTHER

## 2024-03-04 ENCOUNTER — OFFICE VISIT (OUTPATIENT)
Dept: FAMILY MEDICINE | Facility: HOSPITAL | Age: 39
End: 2024-03-04
Payer: MEDICAID

## 2024-03-04 VITALS
DIASTOLIC BLOOD PRESSURE: 87 MMHG | BODY MASS INDEX: 29.91 KG/M2 | HEIGHT: 71 IN | SYSTOLIC BLOOD PRESSURE: 131 MMHG | WEIGHT: 213.63 LBS

## 2024-03-04 DIAGNOSIS — G89.29 CHRONIC BILATERAL LOW BACK PAIN WITH RIGHT-SIDED SCIATICA: ICD-10-CM

## 2024-03-04 DIAGNOSIS — F39 MOOD DISORDER: Primary | ICD-10-CM

## 2024-03-04 DIAGNOSIS — M54.41 CHRONIC BILATERAL LOW BACK PAIN WITH RIGHT-SIDED SCIATICA: ICD-10-CM

## 2024-03-04 PROCEDURE — 99213 OFFICE O/P EST LOW 20 MIN: CPT | Mod: 25

## 2024-03-04 PROCEDURE — 96372 THER/PROPH/DIAG INJ SC/IM: CPT

## 2024-03-04 RX ORDER — KETOROLAC TROMETHAMINE 30 MG/ML
15 INJECTION, SOLUTION INTRAMUSCULAR; INTRAVENOUS ONCE
Status: COMPLETED | OUTPATIENT
Start: 2024-03-04 | End: 2024-03-04

## 2024-03-04 RX ADMIN — KETOROLAC TROMETHAMINE 15 MG: 30 INJECTION INTRAMUSCULAR; INTRAVENOUS at 03:03

## 2024-03-04 NOTE — PROGRESS NOTES
Ketorolac 15mg given IM RUOQ of glut muscle.   Pt tolerated well.   Instructed to wait 15 minutes post injection.

## 2024-03-04 NOTE — PROGRESS NOTES
"Progress Note  [facility]    Subjective:      Daniel Behlar is a 39 y.o. male here for back pain, mood disorder. This is my first time evaluating this patient.         #Back pain  - Take percocet 10 mg q6hr PRN for pain, lyrica 100 mg TID for pain. Reports continue pain. Has surgery scheduled next month with NSGY    #Mood disorder  -reports past dx of bipolar made several years ago. No formal documentation of this available. Previously on SSI, seroquel. Today he reports worsening paranoid delusions and impulsiveness. Requesting to restart medications. States he is a  and is awaiting appt for psych evaluation. He denies SI, HI but feels his back pain is exacerbating these issues.       Active Problem List with Overview Notes    Diagnosis Date Noted    Right shoulder pain 05/19/2021    Depression 12/25/2019    Cocaine abuse 12/25/2019    Alcohol abuse 12/25/2019    Chronic bilateral low back pain with right-sided sciatica 07/26/2018    Chronic GERD 11/15/2016    Mucocele of lower lip 04/15/2015        Review of Systems   Musculoskeletal:  Positive for back pain.   Psychiatric/Behavioral:  Positive for depression. Negative for hallucinations, substance abuse and suicidal ideas. The patient is nervous/anxious and has insomnia.         Paranoia          Objective:   /87   Ht 5' 11" (1.803 m)   Wt 96.9 kg (213 lb 10 oz)   BMI 29.79 kg/m²      Physical Exam  Vitals and nursing note reviewed.   Constitutional:       General: He is in acute distress.      Appearance: Normal appearance. He is not toxic-appearing.   Cardiovascular:      Rate and Rhythm: Normal rate and regular rhythm.      Heart sounds: Normal heart sounds.   Pulmonary:      Effort: Pulmonary effort is normal.      Breath sounds: Normal breath sounds.   Neurological:      Mental Status: He is alert and oriented to person, place, and time.   Psychiatric:      Comments: Anxious, pressured speech            Assessment:   39 y.o. with      "   1. Mood disorder    2. Chronic bilateral low back pain with right-sided sciatica         Plan:   Morro was seen today for manic behavior.    Diagnoses and all orders for this visit:    Mood disorder  - minimal documentation to review about patient reported diagnosis of bipolar. Does have to have more pressure speech (also in acute pain) and reports delusions and paranoid. Also hx of substance use so unclear if patient truly bipolar. States this dx made in inpatient psychiatry unit. [facility] Also provided with medicaid providers. Advised patient that he should go directly to ED if he has persistent SI, HI or feels he is a danger to self or others.     Chronic bilateral low back pain with right-sided sciatica  -given toradol for acute pain, surgery next month with Dr. Sheffield   -     ketorolac injection 15 mg      Follow up with your PCP, Dr. Duvall, in 2-3 months.     Jamie Rm DO  [facility] Family Medicine, PGY-2  5:20 PM, 03/04/2024    *This note was dictated using the [employer] Direct word recognition program. There are word rescognition mistakes that are occasionally missed on review. \    The following information is provided to all patients.  This information is to help you find resources for any of the problems found today that may be affecting your health:                Living healthy guide: www.[facility].gov       Understanding Diabetes: www.diabetes.org       Eating healthy: www.cdc.gov/healthyweight      CDC home safety checklist: www.cdc.gov/steadi/patient.html      Agency on Aging: www.goea.[place].gov       Alcoholics anonymous (AA): www.aa.org      Physical Activity: www.ishaan.nih.gov/sj7kfgs       Tobacco use: www.quitwithusla.org

## 2024-03-05 ENCOUNTER — TELEPHONE (OUTPATIENT)
Dept: FAMILY MEDICINE | Facility: HOSPITAL | Age: 39
End: 2024-03-05
Payer: MEDICAID

## 2024-03-05 ENCOUNTER — TELEPHONE (OUTPATIENT)
Dept: NEUROSURGERY | Facility: CLINIC | Age: 39
End: 2024-03-05
Payer: MEDICAID

## 2024-03-08 ENCOUNTER — HOSPITAL ENCOUNTER (EMERGENCY)
Facility: HOSPITAL | Age: 39
Discharge: HOME | End: 2024-03-08
Attending: FAMILY MEDICINE
Payer: MEDICAID

## 2024-03-08 VITALS
HEART RATE: 88 BPM | SYSTOLIC BLOOD PRESSURE: 145 MMHG | DIASTOLIC BLOOD PRESSURE: 83 MMHG | OXYGEN SATURATION: 98 % | TEMPERATURE: 98 F | WEIGHT: 210 LBS | RESPIRATION RATE: 16 BRPM | HEIGHT: 71 IN | BODY MASS INDEX: 29.4 KG/M2

## 2024-03-08 DIAGNOSIS — M54.50 ACUTE EXACERBATION OF CHRONIC LOW BACK PAIN: Primary | ICD-10-CM

## 2024-03-08 DIAGNOSIS — G89.29 ACUTE EXACERBATION OF CHRONIC LOW BACK PAIN: Primary | ICD-10-CM

## 2024-03-08 PROCEDURE — 99284 EMERGENCY DEPT VISIT MOD MDM: CPT | Mod: 25,ER

## 2024-03-08 PROCEDURE — 25000003 PHARM REV CODE 250: Mod: ER

## 2024-03-08 PROCEDURE — 96372 THER/PROPH/DIAG INJ SC/IM: CPT

## 2024-03-08 PROCEDURE — 63600175 PHARM REV CODE 636 W HCPCS: Mod: ER

## 2024-03-08 RX ORDER — PREDNISONE 10 MG/1
10 TABLET ORAL DAILY
Qty: 21 TABLET | Refills: 0 | Status: SHIPPED | OUTPATIENT
Start: 2024-03-08

## 2024-03-08 RX ORDER — METHOCARBAMOL 500 MG/1
500 TABLET, FILM COATED ORAL 4 TIMES DAILY
Qty: 40 TABLET | Refills: 0 | Status: SHIPPED | OUTPATIENT
Start: 2024-03-08 | End: 2024-03-18

## 2024-03-08 RX ORDER — DEXAMETHASONE SODIUM PHOSPHATE 4 MG/ML
8 INJECTION, SOLUTION INTRA-ARTICULAR; INTRALESIONAL; INTRAMUSCULAR; INTRAVENOUS; SOFT TISSUE
Status: COMPLETED | OUTPATIENT
Start: 2024-03-08 | End: 2024-03-08

## 2024-03-08 RX ORDER — DICLOFENAC SODIUM 10 MG/G
2 GEL TOPICAL 4 TIMES DAILY
Qty: 50 G | Refills: 0 | Status: SHIPPED | OUTPATIENT
Start: 2024-03-08

## 2024-03-08 RX ORDER — KETOROLAC TROMETHAMINE 30 MG/ML
30 INJECTION, SOLUTION INTRAMUSCULAR; INTRAVENOUS
Status: COMPLETED | OUTPATIENT
Start: 2024-03-08 | End: 2024-03-08

## 2024-03-08 RX ORDER — LIDOCAINE 50 MG/G
1 PATCH TOPICAL
Status: DISCONTINUED | OUTPATIENT
Start: 2024-03-08 | End: 2024-03-08 | Stop reason: HOSPADM

## 2024-03-08 RX ORDER — LIDOCAINE 50 MG/G
1 PATCH TOPICAL DAILY
Qty: 5 PATCH | Refills: 0 | Status: SHIPPED | OUTPATIENT
Start: 2024-03-08 | End: 2024-03-13

## 2024-03-08 RX ORDER — METHOCARBAMOL 500 MG/1
500 TABLET, FILM COATED ORAL
Status: COMPLETED | OUTPATIENT
Start: 2024-03-08 | End: 2024-03-08

## 2024-03-08 RX ORDER — TRAMADOL HYDROCHLORIDE 50 MG/1
50 TABLET ORAL
Status: COMPLETED | OUTPATIENT
Start: 2024-03-08 | End: 2024-03-08

## 2024-03-08 RX ADMIN — KETOROLAC TROMETHAMINE 30 MG: 30 INJECTION, SOLUTION INTRAMUSCULAR; INTRAVENOUS at 02:03

## 2024-03-08 RX ADMIN — LIDOCAINE 5% 1 PATCH: 700 PATCH TOPICAL at 02:03

## 2024-03-08 RX ADMIN — DEXAMETHASONE SODIUM PHOSPHATE 8 MG: 4 INJECTION, SOLUTION INTRA-ARTICULAR; INTRALESIONAL; INTRAMUSCULAR; INTRAVENOUS; SOFT TISSUE at 03:03

## 2024-03-08 RX ADMIN — TRAMADOL HYDROCHLORIDE 50 MG: 50 TABLET, COATED ORAL at 03:03

## 2024-03-08 RX ADMIN — METHOCARBAMOL 500 MG: 500 TABLET ORAL at 02:03

## 2024-03-08 NOTE — DISCHARGE INSTRUCTIONS
Thank you for letting me care for you today - it was nice to meet you and I hope you feel better soon. Please return to the ER if your symptoms don't improve or get worse. And be sure to follow up with your primary care provider within the next week if not improving.     Tylenol and Ibuprofen: Rotate between Tylenol and ibuprofen (Motrin), switching every 3 hours. For example, Tylenol at 8am, ibuprofen at 11am, tylenol at 2pm.  Do not take more than 3000 mg of Tylenol in 1 day or more than 2400 mg of ibuprofen and 1 day.    Steroid taper: Take 4 tabs x 3 days, then take 2 tabs x 3 days, then take 1 tab x 3 days.  I recommend taking the steroids in the morning, as they can keep you awake if taken in the evening.    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      Our goal at [facility] is to always give you outstanding care and exceptional service. You may receive a survey by mail or email in the next week about your experience in our ED. We would greatly appreciate you completing and returning the survey. Your feedback provides us with a way to recognize our staff who give very good care and it helps us learn how to improve when your experience was below our aspiration of excellence.     All the best,     Elli Zuleta, MPH, PA-C  Emergency Department Physician Assistant  [facility], [facility]

## 2024-03-09 RX ORDER — HYDROMORPHONE HYDROCHLORIDE 4 MG/1
4 TABLET ORAL EVERY 6 HOURS PRN
Qty: 28 TABLET | Refills: 0 | Status: SHIPPED | OUTPATIENT
Start: 2024-03-09 | End: 2024-03-16 | Stop reason: SDUPTHER

## 2024-03-09 NOTE — ED PROVIDER NOTES
"Encounter Date: 3/8/2024       History     Chief Complaint   Patient presents with    Leg Pain     Patient states he is suppose to have surgery on his L5-S1 however he just did a 22 hour car drive and the sciatica is "unbearable" has been in contact with his physician who advised that he come to the ED for pain management/ control. Right sided pain.      Patient is a 39 y.o. male who presents for evaluation of acute exacerbation of chronic lumbar back pain x 2 days.  Patient states that he just went on a long car ride, which has exacerbated his back pain. Patient has taken prescribe narcotic pain medication for symptom relief.  Patient states he is suppose to have surgery on his L5-S1 in April.     There is no numbness, weakness, incontinence, or retention reported. Denies hx of IVDU or malignancy.  Denies swelling to lower extremities.  Denies fever, headache, chest pain, shortness of breath, wheezing, stridor, drooling, NVD, abdominal pain, constipation, urinary problems, joint problems, rashes, or any other complaints at this time.      The history is provided by the patient.     Review of patient's allergies indicates:  No Known Allergies  Past Medical History:   Diagnosis Date    Bipolar 1 disorder     PTSD (post-traumatic stress disorder)      Past Surgical History:   Procedure Laterality Date    ESOPHAGOGASTRODUODENOSCOPY      LAPAROSCOPIC CHOLECYSTECTOMY N/A 5/28/2020    Procedure: CHOLECYSTECTOMY, LAPAROSCOPIC;  Surgeon: Eduardo Bolden MD;  Location: [facility];  Service: General;  Laterality: N/A;     Family History   Problem Relation Age of Onset    No Known Problems Mother     No Known Problems Father     No Known Problems Sister     No Known Problems Brother      Social History     Tobacco Use    Smoking status: Former     Current packs/day: 0.00     Average packs/day: 0.3 packs/day for 25.1 years (6.3 ttl pk-yrs)     Types: Cigarettes     Start date: 1999     Quit date: 2/2/2024     Years since " quittin.0    Smokeless tobacco: Never    Tobacco comments:     Ambulatory referral to Smoking Cessation program.    Substance Use Topics    Alcohol use: Not Currently    Drug use: Not Currently     Review of Systems   Constitutional:  Negative for chills and fever.   HENT:  Negative for congestion, rhinorrhea and sore throat.    Respiratory:  Negative for shortness of breath and wheezing.    Cardiovascular:  Negative for chest pain.   Gastrointestinal:  Negative for abdominal distention, abdominal pain, diarrhea, nausea and vomiting.   Genitourinary:  Negative for decreased urine volume, dysuria, flank pain, frequency and hematuria.   Musculoskeletal:  Positive for back pain and gait problem (pain with ambulation, secondary to back pain.). Negative for neck pain and neck stiffness.   Skin:  Negative for rash.   Neurological:  Negative for weakness.   Hematological:  Does not bruise/bleed easily.   All other systems reviewed and are negative.      Physical Exam     Initial Vitals [24 1359]   BP Pulse Resp Temp SpO2   (!) 145/83 110 18 98.3 °F (36.8 °C) 98 %      MAP       --         Physical Exam    Constitutional: He appears well-developed and well-nourished.   HENT:   Head: Normocephalic and atraumatic.   Eyes: EOM are normal. Pupils are equal, round, and reactive to light.   Neck:   Normal range of motion.  Cardiovascular:  Normal rate, regular rhythm and normal heart sounds.           Pulmonary/Chest: Breath sounds normal. No respiratory distress. He has no wheezes.   Abdominal: Bowel sounds are normal. He exhibits no distension. There is no abdominal tenderness. There is no rebound.   Musculoskeletal:      Cervical back: Normal and normal range of motion.      Thoracic back: Normal.      Lumbar back: Spasms and tenderness present. No swelling, deformity, signs of trauma or bony tenderness. Decreased range of motion. Positive right straight leg raise test and positive left straight leg raise test.         Back:       Comments: No midline tenderness, bony step-offs, deformities noted to C, T, L-spine.  Neurovascularly intact.  No focal weakness. BUE and BLE strength normal.      Neurological: He is alert and oriented to person, place, and time.         ED Course   Procedures  Labs Reviewed - No data to display       Imaging Results    None          Medications   ketorolac injection 30 mg (30 mg Intramuscular Given 3/8/24 1423)   methocarbamoL tablet 500 mg (500 mg Oral Given 3/8/24 1424)   dexAMETHasone injection 8 mg (8 mg Intramuscular Given 3/8/24 1521)   traMADoL tablet 50 mg (50 mg Oral Given 3/8/24 1521)     Medical Decision Making  Patient is a afebrile, well appearing 39 y.o. male who presents for evaluation of lower back pain. Patient is able to ambulate. Denies saddle anesthesia, loss of bowel or bladder control, or urinary retention. Pulses normal. BLE strength normal. Neurovascularly intact.     Differential Diagnosis includes, but is not limited to: Cauda equina syndrome, disc herniation, spinal stenosis, sciatica, radiculopathy, lumbar muscle strain     All historical, clinical findings were reviewed with patient. At present, clinical picture consistent with benign musculoskeletal back pain without any red flags to indicate need for emergent imaging.  There are no findings worrisome for neurologic deficit or infection. No fevers/trauma/bowel/bladder dysfunction/leg weakness/hx of cancer or IVDA, exam w/o evidence to suggest cord compression, cauda equina or retroperitoneal process.     No further intervention is indicated at this time and I am of the belief that that it is safe to discharge the patient from the emergency department. Patient has been counseled regarding the need for follow-up as well as the indications to return to the emergency room should new or worrisome developments (including,but not limited to: worsening pain, saddle anesthesia, loss of bowel or bladder control, loss of strength or  sensation) occur. Additionally, patient instructed to follow up with PCP and with back surgeon in 2-3 days for recheck of today's complaints.    Discharge and follow-up instructions discussed with the patient who expressed understanding and willingness to comply with recommendations. Patient discharged from the emergency department in stable condition, in no acute distress.     Risk  Prescription drug management.               ED Course as of 03/08/24 2100   Fri Mar 08, 2024   1413 Patient examined and assessed. Patient answering questions appropriately, speaking in complete sentences, respirations are even and unlabored.  AAO x 4.  [OB]   1454 Patient reexamined, pain has not improved with tramadol, Robaxin, lidocaine patch.  Patient appears to be in a fairly significant amount of pain.  Tramadol and dexamethasone ordered. [OB]   [address] [place]  reviewed.  No additional opioids prescribed.  [OB]      ED Course User Index  [OB] Elli Zuleta PA-C                           Clinical Impression:  Final diagnoses:  [M54.50, G89.29] Acute exacerbation of chronic low back pain (Primary)          ED Disposition Condition    Discharge Stable          ED Prescriptions       Medication Sig Dispense Start Date End Date Auth. Provider    predniSONE (DELTASONE) 10 MG tablet Take 1 tablet (10 mg total) by mouth once daily. Take 4 tabs x 3 days, then take 2 tabs x 3 days, then take 1 tab x 3 days. 21 tablet 3/8/2024 -- Elli Zuleta PA-C    methocarbamoL (ROBAXIN) 500 MG Tab Take 1 tablet (500 mg total) by mouth 4 (four) times daily. for 10 days 40 tablet 3/8/2024 3/18/2024 Elli Zuleta PA-C    diclofenac sodium (VOLTAREN) 1 % Gel Apply 2 g topically 4 (four) times daily. 50 g 3/8/2024 -- Elli Zuleta PA-C    LIDOcaine (LIDODERM) 5 % Place 1 patch onto the skin once daily. Remove & Discard patch within 12 hours or as directed by MD for 5 days 5 patch 3/8/2024 3/13/2024 Elli Zuleta PA-C          Follow-up Information     None          Elli Zuleta PA-C  03/08/24 2100

## 2024-03-11 DIAGNOSIS — M51.16 LUMBAR DISC HERNIATION WITH RADICULOPATHY: Primary | ICD-10-CM

## 2024-03-13 ENCOUNTER — TELEPHONE (OUTPATIENT)
Dept: PREADMISSION TESTING | Facility: HOSPITAL | Age: 39
End: 2024-03-13
Payer: MEDICAID

## 2024-03-13 ENCOUNTER — PATIENT MESSAGE (OUTPATIENT)
Dept: NEUROSURGERY | Facility: CLINIC | Age: 39
End: 2024-03-13
Payer: MEDICAID

## 2024-03-14 ENCOUNTER — TELEPHONE (OUTPATIENT)
Dept: NEUROSURGERY | Facility: CLINIC | Age: 39
End: 2024-03-14
Payer: MEDICAID

## 2024-03-15 ENCOUNTER — HOSPITAL ENCOUNTER (OUTPATIENT)
Dept: PREADMISSION TESTING | Facility: HOSPITAL | Age: 39
Discharge: HOME | End: 2024-03-15
Attending: NURSE PRACTITIONER
Payer: MEDICAID

## 2024-03-15 ENCOUNTER — PATIENT MESSAGE (OUTPATIENT)
Dept: ANESTHESIOLOGY | Facility: HOSPITAL | Age: 39
End: 2024-03-15
Payer: MEDICAID

## 2024-03-15 ENCOUNTER — ANESTHESIA EVENT (OUTPATIENT)
Dept: SURGERY | Facility: HOSPITAL | Age: 39
End: 2024-03-15
Payer: MEDICAID

## 2024-03-15 VITALS
BODY MASS INDEX: 29.48 KG/M2 | WEIGHT: 210.56 LBS | SYSTOLIC BLOOD PRESSURE: 113 MMHG | HEART RATE: 85 BPM | HEIGHT: 71 IN | OXYGEN SATURATION: 98 % | DIASTOLIC BLOOD PRESSURE: 63 MMHG

## 2024-03-15 PROBLEM — G47.00 INSOMNIA, UNSPECIFIED: Status: ACTIVE | Noted: 2020-08-16

## 2024-03-15 PROBLEM — H52.10 MYOPIA: Status: ACTIVE | Noted: 2024-03-15

## 2024-03-15 PROBLEM — L66.2 FOLLICULITIS DECALVANS: Status: ACTIVE | Noted: 2021-02-13

## 2024-03-15 PROBLEM — F32.9 MAJOR DEPRESSIVE DISORDER, SINGLE EPISODE, UNSPECIFIED: Status: ACTIVE | Noted: 2021-08-14

## 2024-03-15 PROBLEM — F31.11: Status: ACTIVE | Noted: 2021-08-14

## 2024-03-15 PROBLEM — H52.229 REGULAR ASTIGMATISM: Status: ACTIVE | Noted: 2024-03-15

## 2024-03-15 PROBLEM — F41.9 ANXIETY DISORDER, UNSPECIFIED: Status: ACTIVE | Noted: 2021-08-14

## 2024-03-15 PROBLEM — M54.9 DORSALGIA, UNSPECIFIED: Status: ACTIVE | Noted: 2021-08-14

## 2024-03-15 PROBLEM — M25.539 PAIN IN WRIST: Status: ACTIVE | Noted: 2024-03-15

## 2024-03-15 PROBLEM — R51.9 HEADACHE: Status: ACTIVE | Noted: 2021-08-14

## 2024-03-15 PROBLEM — F90.9 ATTENTION-DEFICIT HYPERACTIVITY DISORDER, UNSPECIFIED TYPE: Status: ACTIVE | Noted: 2021-08-14

## 2024-03-15 RX ORDER — OMEPRAZOLE 20 MG/1
20 TABLET, DELAYED RELEASE ORAL DAILY PRN
COMMUNITY

## 2024-03-15 NOTE — ANESTHESIA PREPROCEDURE EVALUATION
03/15/2024  Daniel Behlar is a 39 y.o., male scheduled for Right L5-S1 microdiscectomy 4/9/24.    Pending PCP clearance     Past Medical History:   Diagnosis Date    Bipolar 1 disorder     PTSD (post-traumatic stress disorder)      Past Surgical History:   Procedure Laterality Date    ESOPHAGOGASTRODUODENOSCOPY      LAPAROSCOPIC CHOLECYSTECTOMY N/A 5/28/2020    Procedure: CHOLECYSTECTOMY, LAPAROSCOPIC;  Surgeon: Eduardo Bolden MD;  Location: [facility];  Service: General;  Laterality: N/A;     Review of patient's allergies indicates:  No Known Allergies    Current Outpatient Medications   Medication Instructions    diazePAM (VALIUM) 5 mg, Oral, Every 8 hours PRN    diclofenac sodium (VOLTAREN) 2 g, Topical (Top), 4 times daily    HYDROmorphone (DILAUDID) 4 mg, Oral, Every 6 hours PRN    ibuprofen (ADVIL,MOTRIN) 800 mg, Oral, Every 6 hours PRN    methocarbamoL (ROBAXIN) 500 mg, Oral, 4 times daily    oxyCODONE-acetaminophen (PERCOCET)  mg per tablet 1 tablet, Oral, Every 6 hours PRN    pantoprazole (PROTONIX) 40 mg, Oral, Daily    predniSONE (DELTASONE) 10 mg, Oral, Daily, Take 4 tabs x 3 days, then take 2 tabs x 3 days, then take 1 tab x 3 days.    pregabalin (LYRICA) 100 mg, Oral, 3 times daily    QUEtiapine (SEROQUEL) 200 mg, Oral, Nightly         Pre-op Assessment    I have reviewed the Patient Summary Reports.     I have reviewed the Nursing Notes. I have reviewed the NPO Status.   I have reviewed the Medications.     Review of Systems  Anesthesia Hx:  No problems with previous Anesthesia               Denies Personal Hx of Anesthesia complications.                    Social:  Alcohol Use, Non-Smoker       Hematology/Oncology:  Hematology Normal   Oncology Normal                                   EENT/Dental:  EENT/Dental Normal           Cardiovascular:  Cardiovascular Normal Exercise tolerance:  good   Denies Pacemaker.        Denies Angina.                                  Pulmonary:  Pulmonary Normal      Denies Shortness of breath.                  Hepatic/GI:     GERD, well controlled             Musculoskeletal:         Spine Disorders: lumbar Chronic Pain           Neurological:  Denies TIA.  Denies CVA. Neuromuscular Disease,  Headaches Denies Seizures.          Peripheral Neuropathy (intermittent right foor)                          Endocrine:  Endocrine Normal            Psych:  Psychiatric History                  Physical Exam  General: Cooperative, Oriented, Alert and Well nourished    Airway:  Mallampati: I   Mouth Opening: Normal  TM Distance: Normal  Tongue: Normal  Neck ROM: Normal ROM    Dental:  Intact, Retainer  Upper removal veneers    Lab Results   Component Value Date    WBC 11.37 08/18/2021    HGB 13.7 (L) 08/18/2021    HCT 41.5 08/18/2021     (H) 08/18/2021    CHOL 263 (H) 11/15/2016    TRIG 222 (H) 11/15/2016    HDL 49 11/15/2016    ALT 36 08/18/2021    AST 23 08/18/2021     08/18/2021    K 3.7 08/18/2021     08/18/2021    CREATININE 1.0 08/18/2021    BUN 11 08/18/2021    CO2 23 08/18/2021    TSH 2.853 08/18/2021    HGBA1C 5.4 11/15/2016     Results for orders placed or performed during the hospital encounter of 08/18/21   EKG 12-lead    Collection Time: 08/18/21  9:26 PM    Narrative    Test Reason : R53.1,    Vent. Rate : 092 BPM     Atrial Rate : 092 BPM     P-R Int : 128 ms          QRS Dur : 098 ms      QT Int : 360 ms       P-R-T Axes : 050 070 017 degrees     QTc Int : 445 ms    Normal sinus rhythm  Normal ECG  No previous ECGs available  Confirmed by Marlys Coyle MD ([id number]) on 8/19/2021 9:46:20 AM    Referred By: System System           Confirmed By:Marlys Coyle MD         Anesthesia Plan  Type of Anesthesia, risks & benefits discussed:    Anesthesia Type: Gen ETT  Intra-op Monitoring Plan: Standard ASA Monitors  Post Op Pain Control Plan:  multimodal analgesia and IV/PO Opioids PRN  Induction:  IV  Informed Consent: Informed consent signed with the Patient and all parties understand the risks and agree with anesthesia plan.  All questions answered.   ASA Score: 2  Day of Surgery Review of History & Physical: H&P Update referred to the surgeon/provider.  Anesthesia Plan Notes: Anesthesia consent to be signed prior to surgery 4/9/24      Ready For Surgery From Anesthesia Perspective.     .

## 2024-03-17 DIAGNOSIS — M54.31 SCIATICA OF RIGHT SIDE: ICD-10-CM

## 2024-03-17 DIAGNOSIS — G89.29 CHRONIC BILATERAL LOW BACK PAIN WITH RIGHT-SIDED SCIATICA: ICD-10-CM

## 2024-03-17 DIAGNOSIS — M54.41 CHRONIC BILATERAL LOW BACK PAIN WITH RIGHT-SIDED SCIATICA: ICD-10-CM

## 2024-03-17 RX ORDER — HYDROMORPHONE HYDROCHLORIDE 4 MG/1
4 TABLET ORAL EVERY 6 HOURS PRN
Qty: 28 TABLET | Refills: 0 | Status: SHIPPED | OUTPATIENT
Start: 2024-03-17 | End: 2024-03-23 | Stop reason: SDUPTHER

## 2024-03-17 RX ORDER — OXYCODONE AND ACETAMINOPHEN 10; 325 MG/1; MG/1
1 TABLET ORAL EVERY 6 HOURS PRN
Qty: 48 TABLET | Refills: 0 | Status: SHIPPED | OUTPATIENT
Start: 2024-03-17 | End: 2024-04-02 | Stop reason: SDUPTHER

## 2024-03-19 DIAGNOSIS — F45.41 PAIN AGGRAVATED BY ANXIETY: ICD-10-CM

## 2024-03-19 DIAGNOSIS — F41.9 PAIN AGGRAVATED BY ANXIETY: ICD-10-CM

## 2024-03-22 RX ORDER — DIAZEPAM 5 MG/1
5 TABLET ORAL EVERY 8 HOURS PRN
Qty: 21 TABLET | Refills: 0 | Status: SHIPPED | OUTPATIENT
Start: 2024-03-22 | End: 2024-04-02 | Stop reason: SDUPTHER

## 2024-03-24 RX ORDER — HYDROMORPHONE HYDROCHLORIDE 4 MG/1
4 TABLET ORAL EVERY 6 HOURS PRN
Qty: 28 TABLET | Refills: 0 | Status: SHIPPED | OUTPATIENT
Start: 2024-03-24 | End: 2024-03-25 | Stop reason: SDUPTHER

## 2024-03-26 RX ORDER — HYDROMORPHONE HYDROCHLORIDE 4 MG/1
4 TABLET ORAL EVERY 6 HOURS PRN
Qty: 28 TABLET | Refills: 0 | Status: SHIPPED | OUTPATIENT
Start: 2024-03-26 | End: 2024-03-27 | Stop reason: SDUPTHER

## 2024-03-26 RX ORDER — PREGABALIN 100 MG/1
100 CAPSULE ORAL 3 TIMES DAILY
Qty: 90 CAPSULE | Refills: 5 | Status: SHIPPED | OUTPATIENT
Start: 2024-03-26 | End: 2024-03-27 | Stop reason: SDUPTHER

## 2024-03-27 ENCOUNTER — TELEPHONE (OUTPATIENT)
Dept: ANESTHESIOLOGY | Facility: HOSPITAL | Age: 39
End: 2024-03-27
Payer: MEDICAID

## 2024-03-27 ENCOUNTER — TELEPHONE (OUTPATIENT)
Dept: NEUROSURGERY | Facility: CLINIC | Age: 39
End: 2024-03-27
Payer: MEDICAID

## 2024-03-27 RX ORDER — HYDROMORPHONE HYDROCHLORIDE 4 MG/1
4 TABLET ORAL EVERY 6 HOURS PRN
Qty: 28 TABLET | Refills: 0 | Status: SHIPPED | OUTPATIENT
Start: 2024-03-27 | End: 2024-04-04 | Stop reason: SDUPTHER

## 2024-03-27 RX ORDER — PREGABALIN 100 MG/1
100 CAPSULE ORAL 3 TIMES DAILY
Qty: 90 CAPSULE | Refills: 5 | Status: SHIPPED | OUTPATIENT
Start: 2024-03-27 | End: 2024-09-25

## 2024-03-27 NOTE — TELEPHONE ENCOUNTER
Good morning! Mr. Behlar is scheduled for surgery with Dr. Sheffield on 4/9. Is he medically cleared for surgery?

## 2024-04-02 ENCOUNTER — TELEPHONE (OUTPATIENT)
Dept: PREADMISSION TESTING | Facility: HOSPITAL | Age: 39
End: 2024-04-02
Payer: MEDICAID

## 2024-04-02 DIAGNOSIS — F45.41 PAIN AGGRAVATED BY ANXIETY: ICD-10-CM

## 2024-04-02 DIAGNOSIS — G89.29 CHRONIC BILATERAL LOW BACK PAIN WITH RIGHT-SIDED SCIATICA: ICD-10-CM

## 2024-04-02 DIAGNOSIS — F41.9 PAIN AGGRAVATED BY ANXIETY: ICD-10-CM

## 2024-04-02 DIAGNOSIS — M54.31 SCIATICA OF RIGHT SIDE: ICD-10-CM

## 2024-04-02 DIAGNOSIS — M54.41 CHRONIC BILATERAL LOW BACK PAIN WITH RIGHT-SIDED SCIATICA: ICD-10-CM

## 2024-04-02 RX ORDER — DIAZEPAM 5 MG/1
5 TABLET ORAL EVERY 8 HOURS PRN
Qty: 21 TABLET | Refills: 0 | Status: SHIPPED | OUTPATIENT
Start: 2024-04-02 | End: 2024-05-04 | Stop reason: SDUPTHER

## 2024-04-02 RX ORDER — OXYCODONE AND ACETAMINOPHEN 10; 325 MG/1; MG/1
1 TABLET ORAL EVERY 6 HOURS PRN
Qty: 48 TABLET | Refills: 0 | Status: SHIPPED | OUTPATIENT
Start: 2024-04-02 | End: 2024-04-08 | Stop reason: SDUPTHER

## 2024-04-04 ENCOUNTER — ON-DEMAND VIRTUAL (OUTPATIENT)
Dept: URGENT CARE | Facility: CLINIC | Age: 39
End: 2024-04-04
Payer: MEDICAID

## 2024-04-04 DIAGNOSIS — F31.11: Primary | ICD-10-CM

## 2024-04-04 PROCEDURE — 99213 OFFICE O/P EST LOW 20 MIN: CPT | Mod: 95,,, | Performed by: NURSE PRACTITIONER

## 2024-04-04 NOTE — TELEPHONE ENCOUNTER
Returned call to [facility] pharmacy, spoke w/ James, she is requesting to know how long will the pt getting the pain medication. I stated to pharmacist that I am unsure but he is having surgery on Tuesday. Pharmacist voiced understanding

## 2024-04-04 NOTE — PROGRESS NOTES
Subjective:      Patient ID: Daniel Behlar is a 39 y.o. male.    Vitals:  vitals were not taken for this visit.     Chief Complaint: Anxiety      Visit Type: TELE AUDIOVISUAL    Present with the patient at the time of consultation: TELEMED PRESENT WITH PATIENT: None        Past Medical History:   Diagnosis Date    Bipolar 1 disorder     PTSD (post-traumatic stress disorder)      Past Surgical History:   Procedure Laterality Date    ESOPHAGOGASTRODUODENOSCOPY      LAPAROSCOPIC CHOLECYSTECTOMY N/A 5/28/2020    Procedure: CHOLECYSTECTOMY, LAPAROSCOPIC;  Surgeon: Eduardo Bolden MD;  Location: [facility];  Service: General;  Laterality: N/A;     Review of patient's allergies indicates:  No Known Allergies  Current Outpatient Medications on File Prior to Visit   Medication Sig Dispense Refill    diazePAM (VALIUM) 5 MG tablet Take 1 tablet (5 mg total) by mouth every 8 (eight) hours as needed for Anxiety (muscle spasms). 21 tablet 0    diclofenac sodium (VOLTAREN) 1 % Gel Apply 2 g topically 4 (four) times daily. 50 g 0    HYDROmorphone (DILAUDID) 4 MG tablet Take 1 tablet (4 mg total) by mouth every 6 (six) hours as needed for Pain. 28 tablet 0    ibuprofen (ADVIL,MOTRIN) 800 MG tablet Take 1 tablet (800 mg total) by mouth every 6 (six) hours as needed for Pain. 20 tablet 0    omeprazole (PRILOSEC OTC) 20 MG tablet Take 20 mg by mouth daily as needed.      oxyCODONE-acetaminophen (PERCOCET)  mg per tablet Take 1 tablet by mouth every 6 (six) hours as needed for Pain. 48 tablet 0    pantoprazole (PROTONIX) 40 MG tablet Take 1 tablet (40 mg total) by mouth once daily. 30 tablet 3    predniSONE (DELTASONE) 10 MG tablet Take 1 tablet (10 mg total) by mouth once daily. Take 4 tabs x 3 days, then take 2 tabs x 3 days, then take 1 tab x 3 days. 21 tablet 0    pregabalin (LYRICA) 100 MG capsule Take 1 capsule (100 mg total) by mouth 3 (three) times daily. 90 capsule 5    QUEtiapine (SEROQUEL) 200 MG Tab Take 200 mg by  mouth every evening.       No current facility-administered medications on file prior to visit.     Family History   Problem Relation Age of Onset    No Known Problems Mother     No Known Problems Father     No Known Problems Sister     No Known Problems Brother            Ohs Peq Odvv Intake    4/4/2024  1:26 PM CDT - Filed by Patient   What is your current physical address in the event of a medical emergency? [address] [place]   Are you able to take your vital signs? No   Please attach any relevant images or files          38 yo male with c/o positive diagnosis of bipolar disorder. He states he can not get a follow up for three weeks. He states he was off his medication and under stress and feels like he is going to crash. He is a . He denies si/hi        Constitution: Negative.   HENT: Negative.     Cardiovascular: Negative.    Eyes: Negative.    Respiratory: Negative.     Gastrointestinal: Negative.  Negative for bowel incontinence.   Endocrine: negative.   Genitourinary: Negative.  Negative for dysuria, flank pain, bladder incontinence and pelvic pain.   Musculoskeletal: Negative.  Negative for pain, abnormal ROM of joint and back pain.   Skin: Negative.    Allergic/Immunologic: Negative.    Neurological: Negative.    Hematologic/Lymphatic: Negative.    Psychiatric/Behavioral:  Positive for nervous/anxious, history of mental illness and depression. Negative for homicidal ideas and suicidal ideas. The patient is nervous/anxious.         Objective:   The physical exam was conducted virtually.  LOCATION OF PATIENT home  Physical Exam   Constitutional: He is oriented to person, place, and time. He appears well-developed.   HENT:   Head: Normocephalic and atraumatic.   Ears:   Right Ear: Hearing, tympanic membrane and external ear normal.   Left Ear: Hearing, tympanic membrane and external ear normal.   Nose: Nose normal.   Mouth/Throat: Uvula is midline, oropharynx is clear and moist and  mucous membranes are normal.   Eyes: Conjunctivae and EOM are normal. Pupils are equal, round, and reactive to light.   Neck: Neck supple.   Cardiovascular: Normal rate.   Pulmonary/Chest: Effort normal and breath sounds normal.   Musculoskeletal: Normal range of motion.         General: Normal range of motion.   Neurological: He is alert and oriented to person, place, and time.   Skin: Skin is warm.   Psychiatric: His behavior is normal. Thought content normal.   Nursing note and vitals reviewed.      Assessment:     1. Bipolar disord, crnt episode manic w/o psych features, mild        Plan:     Patient advised to go to ED for further evaluation      Bipolar disord, crnt episode manic w/o psych features, mild

## 2024-04-06 RX ORDER — HYDROMORPHONE HYDROCHLORIDE 4 MG/1
4 TABLET ORAL EVERY 6 HOURS PRN
Qty: 28 TABLET | Refills: 0 | Status: ON HOLD | OUTPATIENT
Start: 2024-04-06 | End: 2024-04-09 | Stop reason: HOSPADM

## 2024-04-08 ENCOUNTER — TELEPHONE (OUTPATIENT)
Dept: PREADMISSION TESTING | Facility: HOSPITAL | Age: 39
End: 2024-04-08
Payer: MEDICAID

## 2024-04-08 DIAGNOSIS — F45.41 PAIN AGGRAVATED BY ANXIETY: ICD-10-CM

## 2024-04-08 DIAGNOSIS — M54.31 SCIATICA OF RIGHT SIDE: ICD-10-CM

## 2024-04-08 DIAGNOSIS — G89.29 CHRONIC BILATERAL LOW BACK PAIN WITH RIGHT-SIDED SCIATICA: ICD-10-CM

## 2024-04-08 DIAGNOSIS — M54.41 CHRONIC BILATERAL LOW BACK PAIN WITH RIGHT-SIDED SCIATICA: ICD-10-CM

## 2024-04-08 DIAGNOSIS — F41.9 PAIN AGGRAVATED BY ANXIETY: ICD-10-CM

## 2024-04-08 RX ORDER — OXYCODONE AND ACETAMINOPHEN 10; 325 MG/1; MG/1
1 TABLET ORAL EVERY 6 HOURS PRN
Qty: 48 TABLET | Refills: 0 | Status: SHIPPED | OUTPATIENT
Start: 2024-04-08 | End: 2024-04-17 | Stop reason: SDUPTHER

## 2024-04-08 RX ORDER — DIAZEPAM 5 MG/1
5 TABLET ORAL EVERY 8 HOURS PRN
Qty: 21 TABLET | Refills: 0 | Status: CANCELLED | OUTPATIENT
Start: 2024-04-08 | End: 2024-05-08

## 2024-04-09 ENCOUNTER — HOSPITAL ENCOUNTER (OUTPATIENT)
Facility: HOSPITAL | Age: 39
Discharge: HOME | End: 2024-04-09
Attending: NEUROLOGICAL SURGERY | Admitting: NEUROLOGICAL SURGERY
Payer: MEDICAID

## 2024-04-09 ENCOUNTER — ANESTHESIA (OUTPATIENT)
Dept: SURGERY | Facility: HOSPITAL | Age: 39
End: 2024-04-09
Payer: MEDICAID

## 2024-04-09 DIAGNOSIS — M51.16 LUMBAR DISC HERNIATION WITH RADICULOPATHY: Primary | ICD-10-CM

## 2024-04-09 PROCEDURE — 36000711: Performed by: NEUROLOGICAL SURGERY

## 2024-04-09 PROCEDURE — 63600175 PHARM REV CODE 636 W HCPCS: Performed by: NEUROLOGICAL SURGERY

## 2024-04-09 PROCEDURE — 63600175 PHARM REV CODE 636 W HCPCS: Performed by: NURSE PRACTITIONER

## 2024-04-09 PROCEDURE — 71000033 HC RECOVERY, INTIAL HOUR: Performed by: NEUROLOGICAL SURGERY

## 2024-04-09 PROCEDURE — D9220A PRA ANESTHESIA: Mod: ANES,,, | Performed by: STUDENT IN AN ORGANIZED HEALTH CARE EDUCATION/TRAINING PROGRAM

## 2024-04-09 PROCEDURE — 37000009 HC ANESTHESIA EA ADD 15 MINS: Performed by: NEUROLOGICAL SURGERY

## 2024-04-09 PROCEDURE — D9220A PRA ANESTHESIA: Mod: CRNA,,, | Performed by: NURSE ANESTHETIST, CERTIFIED REGISTERED

## 2024-04-09 PROCEDURE — 71000039 HC RECOVERY, EACH ADD'L HOUR: Performed by: NEUROLOGICAL SURGERY

## 2024-04-09 PROCEDURE — 25000003 PHARM REV CODE 250: Performed by: NURSE ANESTHETIST, CERTIFIED REGISTERED

## 2024-04-09 PROCEDURE — 71000015 HC POSTOP RECOV 1ST HR: Performed by: NEUROLOGICAL SURGERY

## 2024-04-09 PROCEDURE — 25000003 PHARM REV CODE 250: Performed by: NEUROLOGICAL SURGERY

## 2024-04-09 PROCEDURE — 36000710: Performed by: NEUROLOGICAL SURGERY

## 2024-04-09 PROCEDURE — 71000016 HC POSTOP RECOV ADDL HR: Performed by: NEUROLOGICAL SURGERY

## 2024-04-09 PROCEDURE — 27201423 OPTIME MED/SURG SUP & DEVICES STERILE SUPPLY: Performed by: NEUROLOGICAL SURGERY

## 2024-04-09 PROCEDURE — 63600175 PHARM REV CODE 636 W HCPCS: Performed by: STUDENT IN AN ORGANIZED HEALTH CARE EDUCATION/TRAINING PROGRAM

## 2024-04-09 PROCEDURE — 63030 LAMOT DCMPRN NRV RT 1 LMBR: CPT | Mod: RT,,, | Performed by: NEUROLOGICAL SURGERY

## 2024-04-09 PROCEDURE — 37000008 HC ANESTHESIA 1ST 15 MINUTES: Performed by: NEUROLOGICAL SURGERY

## 2024-04-09 PROCEDURE — 63600175 PHARM REV CODE 636 W HCPCS: Performed by: NURSE ANESTHETIST, CERTIFIED REGISTERED

## 2024-04-09 RX ORDER — PREGABALIN 75 MG/1
75 CAPSULE ORAL
Status: COMPLETED | OUTPATIENT
Start: 2024-04-09 | End: 2024-04-09

## 2024-04-09 RX ORDER — ONDANSETRON HYDROCHLORIDE 2 MG/ML
4 INJECTION, SOLUTION INTRAVENOUS DAILY PRN
Status: DISCONTINUED | OUTPATIENT
Start: 2024-04-09 | End: 2024-04-09 | Stop reason: HOSPADM

## 2024-04-09 RX ORDER — DEXAMETHASONE SODIUM PHOSPHATE 4 MG/ML
INJECTION, SOLUTION INTRA-ARTICULAR; INTRALESIONAL; INTRAMUSCULAR; INTRAVENOUS; SOFT TISSUE
Status: DISCONTINUED | OUTPATIENT
Start: 2024-04-09 | End: 2024-04-09

## 2024-04-09 RX ORDER — ROCURONIUM BROMIDE 10 MG/ML
INJECTION, SOLUTION INTRAVENOUS
Status: DISCONTINUED | OUTPATIENT
Start: 2024-04-09 | End: 2024-04-09

## 2024-04-09 RX ORDER — MIDAZOLAM HYDROCHLORIDE 1 MG/ML
INJECTION INTRAMUSCULAR; INTRAVENOUS
Status: DISCONTINUED | OUTPATIENT
Start: 2024-04-09 | End: 2024-04-09

## 2024-04-09 RX ORDER — ONDANSETRON HYDROCHLORIDE 2 MG/ML
INJECTION, SOLUTION INTRAVENOUS
Status: DISCONTINUED | OUTPATIENT
Start: 2024-04-09 | End: 2024-04-09

## 2024-04-09 RX ORDER — ONDANSETRON HYDROCHLORIDE 2 MG/ML
4 INJECTION, SOLUTION INTRAVENOUS EVERY 8 HOURS PRN
Status: DISCONTINUED | OUTPATIENT
Start: 2024-04-09 | End: 2024-04-09 | Stop reason: HOSPADM

## 2024-04-09 RX ORDER — HYDROMORPHONE HYDROCHLORIDE 2 MG/ML
INJECTION, SOLUTION INTRAMUSCULAR; INTRAVENOUS; SUBCUTANEOUS
Status: DISCONTINUED | OUTPATIENT
Start: 2024-04-09 | End: 2024-04-09

## 2024-04-09 RX ORDER — DIAZEPAM 5 MG/1
5 TABLET ORAL EVERY 6 HOURS PRN
Status: DISCONTINUED | OUTPATIENT
Start: 2024-04-09 | End: 2024-04-09 | Stop reason: HOSPADM

## 2024-04-09 RX ORDER — OXYCODONE AND ACETAMINOPHEN 10; 325 MG/1; MG/1
1 TABLET ORAL EVERY 4 HOURS PRN
Status: DISCONTINUED | OUTPATIENT
Start: 2024-04-09 | End: 2024-04-09 | Stop reason: HOSPADM

## 2024-04-09 RX ORDER — ACETAMINOPHEN 325 MG/1
650 TABLET ORAL
Status: COMPLETED | OUTPATIENT
Start: 2024-04-09 | End: 2024-04-09

## 2024-04-09 RX ORDER — CEFAZOLIN SODIUM 2 G/50ML
2 SOLUTION INTRAVENOUS ONCE
Status: DISCONTINUED | OUTPATIENT
Start: 2024-04-09 | End: 2024-04-09 | Stop reason: HOSPADM

## 2024-04-09 RX ORDER — CYCLOBENZAPRINE HCL 10 MG
10 TABLET ORAL
Status: COMPLETED | OUTPATIENT
Start: 2024-04-09 | End: 2024-04-09

## 2024-04-09 RX ORDER — FENTANYL CITRATE 50 UG/ML
INJECTION, SOLUTION INTRAMUSCULAR; INTRAVENOUS
Status: DISCONTINUED | OUTPATIENT
Start: 2024-04-09 | End: 2024-04-09

## 2024-04-09 RX ORDER — LIDOCAINE HYDROCHLORIDE 10 MG/ML
1 INJECTION, SOLUTION EPIDURAL; INFILTRATION; INTRACAUDAL; PERINEURAL ONCE
Status: DISCONTINUED | OUTPATIENT
Start: 2024-04-09 | End: 2024-04-09 | Stop reason: HOSPADM

## 2024-04-09 RX ORDER — CELECOXIB 100 MG/1
200 CAPSULE ORAL
Status: COMPLETED | OUTPATIENT
Start: 2024-04-09 | End: 2024-04-09

## 2024-04-09 RX ORDER — SODIUM CHLORIDE, SODIUM LACTATE, POTASSIUM CHLORIDE, CALCIUM CHLORIDE 600; 310; 30; 20 MG/100ML; MG/100ML; MG/100ML; MG/100ML
INJECTION, SOLUTION INTRAVENOUS CONTINUOUS
Status: DISCONTINUED | OUTPATIENT
Start: 2024-04-09 | End: 2024-04-09 | Stop reason: HOSPADM

## 2024-04-09 RX ORDER — PROPOFOL 10 MG/ML
VIAL (ML) INTRAVENOUS
Status: DISCONTINUED | OUTPATIENT
Start: 2024-04-09 | End: 2024-04-09

## 2024-04-09 RX ORDER — OXYCODONE AND ACETAMINOPHEN 5; 325 MG/1; MG/1
1 TABLET ORAL EVERY 4 HOURS PRN
Status: DISCONTINUED | OUTPATIENT
Start: 2024-04-09 | End: 2024-04-09 | Stop reason: HOSPADM

## 2024-04-09 RX ORDER — OXYCODONE HYDROCHLORIDE 5 MG/1
5 TABLET ORAL
Status: DISCONTINUED | OUTPATIENT
Start: 2024-04-09 | End: 2024-04-09 | Stop reason: HOSPADM

## 2024-04-09 RX ORDER — LIDOCAINE HYDROCHLORIDE 20 MG/ML
INJECTION, SOLUTION EPIDURAL; INFILTRATION; INTRACAUDAL; PERINEURAL
Status: DISCONTINUED | OUTPATIENT
Start: 2024-04-09 | End: 2024-04-09

## 2024-04-09 RX ORDER — HYDROMORPHONE HYDROCHLORIDE 2 MG/ML
0.5 INJECTION, SOLUTION INTRAMUSCULAR; INTRAVENOUS; SUBCUTANEOUS EVERY 5 MIN PRN
Status: DISCONTINUED | OUTPATIENT
Start: 2024-04-09 | End: 2024-04-09 | Stop reason: HOSPADM

## 2024-04-09 RX ORDER — OXYCODONE HCL 10 MG/1
10 TABLET, FILM COATED, EXTENDED RELEASE ORAL
Status: COMPLETED | OUTPATIENT
Start: 2024-04-09 | End: 2024-04-09

## 2024-04-09 RX ORDER — ACETAMINOPHEN 325 MG/1
325 TABLET ORAL EVERY 6 HOURS PRN
Status: DISCONTINUED | OUTPATIENT
Start: 2024-04-09 | End: 2024-04-09 | Stop reason: HOSPADM

## 2024-04-09 RX ADMIN — HYDROMORPHONE HYDROCHLORIDE 0.5 MG: 2 INJECTION, SOLUTION INTRAMUSCULAR; INTRAVENOUS; SUBCUTANEOUS at 02:04

## 2024-04-09 RX ADMIN — HYDROMORPHONE HYDROCHLORIDE 1.2 MG: 2 INJECTION INTRAMUSCULAR; INTRAVENOUS; SUBCUTANEOUS at 12:04

## 2024-04-09 RX ADMIN — PROPOFOL 200 MG: 10 INJECTION, EMULSION INTRAVENOUS at 12:04

## 2024-04-09 RX ADMIN — MIDAZOLAM HYDROCHLORIDE 2 MG: 1 INJECTION, SOLUTION INTRAMUSCULAR; INTRAVENOUS at 12:04

## 2024-04-09 RX ADMIN — SODIUM CHLORIDE, SODIUM LACTATE, POTASSIUM CHLORIDE, AND CALCIUM CHLORIDE: .6; .31; .03; .02 INJECTION, SOLUTION INTRAVENOUS at 12:04

## 2024-04-09 RX ADMIN — ACETAMINOPHEN 650 MG: 325 TABLET ORAL at 10:04

## 2024-04-09 RX ADMIN — DEXAMETHASONE SODIUM PHOSPHATE 8 MG: 4 INJECTION, SOLUTION INTRA-ARTICULAR; INTRALESIONAL; INTRAMUSCULAR; INTRAVENOUS; SOFT TISSUE at 12:04

## 2024-04-09 RX ADMIN — FENTANYL CITRATE 100 MCG: 50 INJECTION INTRAMUSCULAR; INTRAVENOUS at 12:04

## 2024-04-09 RX ADMIN — OXYCODONE HYDROCHLORIDE 10 MG: 10 TABLET, FILM COATED, EXTENDED RELEASE ORAL at 10:04

## 2024-04-09 RX ADMIN — SODIUM CHLORIDE, SODIUM LACTATE, POTASSIUM CHLORIDE, AND CALCIUM CHLORIDE: .6; .31; .03; .02 INJECTION, SOLUTION INTRAVENOUS at 01:04

## 2024-04-09 RX ADMIN — PREGABALIN 75 MG: 75 CAPSULE ORAL at 10:04

## 2024-04-09 RX ADMIN — ROCURONIUM BROMIDE 50 MG: 10 INJECTION, SOLUTION INTRAVENOUS at 12:04

## 2024-04-09 RX ADMIN — LIDOCAINE HYDROCHLORIDE 50 MG: 20 INJECTION, SOLUTION EPIDURAL; INFILTRATION; INTRACAUDAL; PERINEURAL at 12:04

## 2024-04-09 RX ADMIN — HYDROMORPHONE HYDROCHLORIDE 0.8 MG: 2 INJECTION INTRAMUSCULAR; INTRAVENOUS; SUBCUTANEOUS at 01:04

## 2024-04-09 RX ADMIN — ONDANSETRON 8 MG: 2 INJECTION, SOLUTION INTRAMUSCULAR; INTRAVENOUS at 12:04

## 2024-04-09 RX ADMIN — SODIUM CHLORIDE, POTASSIUM CHLORIDE, SODIUM LACTATE AND CALCIUM CHLORIDE: 600; 310; 30; 20 INJECTION, SOLUTION INTRAVENOUS at 10:04

## 2024-04-09 RX ADMIN — CELECOXIB 200 MG: 100 CAPSULE ORAL at 10:04

## 2024-04-09 RX ADMIN — CYCLOBENZAPRINE 10 MG: 10 TABLET, FILM COATED ORAL at 10:04

## 2024-04-09 NOTE — DISCHARGE INSTRUCTIONS
Remain active with walking and other light activities daily.  No heavy lifting, no extreme bending or twisting.  Limit upright sitting to 15 minutes per hour.     Remove dressing on 04/11/24.  Allow steri-strips to fall off on their own.  Ok to shower on 04/11/24, but do not immerse wound in water      ANESTHESIA  -For the first 24 hours after surgery:  Do not drive, use heavy equipment, make important decisions, or drink alcohol  -It is normal to feel sleepy for several hours.  Rest until you are more awake.  -Have someone stay with you, if needed.  They can watch for problems and help keep you safe.  -Some possible post anesthesia side effects include: nausea and vomiting, sore throat and hoarseness, sleepiness, and dizziness.    PAIN  -If you have pain after surgery, pain medicine will help you feel better.  Take it as directed, before pain becomes severe.  Most pain relievers taken by mouth need at least 20-30 minutes to start working.  -Do not drive or drink alcohol while taking pain medicine.  -Pain medication can upset your stomach.  Taking them with a little food may help.  -Other ways to help control pain: elevation, ice, and relaxation  -Call your surgeon if still having unmanageable pain an hour after taking pain medicine.  -Pain medicine can cause constipation.  Taking an over-the counter stool softener while on prescription pain medicine and drinking plenty of fluids can prevent this side effect.  -Call your surgeon if you have severe side effects like: breathing problems, trouble waking up, dizziness, confusion, or severe constipation.    NAUSEA  -Some people have nausea after surgery.  This is often because of anesthesia, pain, pain medicine, or the stress of surgery.  -Do not push yourself to eat.  Start off with clear liquids and soup.  Slowly move to solid foods.  Don't eat fatty, rich, spicy foods at first.  Eat smaller amounts.  -If you develop persistent nausea and vomiting please notify your  surgeon immediately.    BLEEDING  -Different types of surgery require different types of care and dressing changes.  It is important to follow all instructions and advice from your surgeon.  Change dressing as directed.  Call your surgeon for any concerns regarding postop bleeding.    SIGNS OF INFECTION  -Signs of infection include: fever, swelling, drainage, and redness  -Notify your surgeon if you have a fever of 100.4 F (38.0 C) or higher.  -Notify your surgeon if you notice redness, swelling, increased pain, pus, or a foul smell at the incision site.

## 2024-04-09 NOTE — ANESTHESIA POSTPROCEDURE EVALUATION
Anesthesia Post Evaluation    Patient: Daniel Behlar    Procedure(s) Performed: Procedure(s) (LRB):  DISCECTOMY, SPINE, MINIMALLY INVASIVE, USING MICROSCOPE (Right)    Final Anesthesia Type: general      Patient location during evaluation: PACU  Patient participation: Yes- Able to Participate  Level of consciousness: awake  Post-procedure vital signs: reviewed and stable  Pain management: adequate  Airway patency: patent    PONV status at discharge: No PONV  Anesthetic complications: no      Cardiovascular status: blood pressure returned to baseline  Respiratory status: unassisted  Hydration status: euvolemic  Follow-up not needed.              Vitals Value Taken Time   /74 04/09/24 1615   Temp 36.7 °C (98.1 °F) 04/09/24 1615   Pulse 54 04/09/24 1615   Resp 18 04/09/24 1615   SpO2 96 % 04/09/24 1615         Event Time   Out of Recovery 15:13:41         Pain/Sheryl Score: Pain Rating Prior to Med Admin: 3 (4/9/2024  3:02 PM)  Sheryl Score: 10 (4/9/2024  4:15 PM)
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
## 2024-04-09 NOTE — ANESTHESIA PROCEDURE NOTES
Intubation    Date/Time: 4/9/2024 12:31 PM    Performed by: Rakan Govea CRNA  Authorized by: Roberto Camacho MD    Intubation:     Induction:  Intravenous    Intubated:  Postinduction    Mask Ventilation:  Easy mask    Attempts:  1    Attempted By:  CRNA    Method of Intubation:  Video laryngoscopy    Blade:  Jim 3    Laryngeal View Grade: Grade I - full view of cords      Difficult Airway Encountered?: No      Complications:  None    Airway Device:  Oral endotracheal tube    Airway Device Size:  7.5    Style/Cuff Inflation:  Cuffed    Inflation Amount (mL):  6    Tube secured:  21    Secured at:  The lips    Placement Verified By:  Capnometry    Complicating Factors:  None    Findings Post-Intubation:  BS equal bilateral
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
## 2024-04-09 NOTE — TRANSFER OF CARE
"Anesthesia Transfer of Care Note    Patient: Daniel Behlar    Procedure(s) Performed: Procedure(s) (LRB):  DISCECTOMY, SPINE, MINIMALLY INVASIVE, USING MICROSCOPE (Right)    Patient location: PACU    Transport from OR: Transported from OR on 6-10 L/min O2 by face mask with adequate spontaneous ventilation    Post pain: adequate analgesia    Post assessment: no apparent anesthetic complications    Post vital signs: stable    Level of consciousness: sedated    Nausea/Vomiting: no nausea/vomiting    Complications: none    Transfer of care protocol was followed    Last vitals: Visit Vitals  /62 (BP Location: Left arm, Patient Position: Lying)   Pulse 60   Temp 37.2 °C (98.9 °F) (Tympanic)   Resp 16   Ht 5' 11" (1.803 m)   Wt 95.3 kg (210 lb)   SpO2 99%   BMI 29.29 kg/m²     "

## 2024-04-10 VITALS
HEIGHT: 71 IN | OXYGEN SATURATION: 96 % | HEART RATE: 54 BPM | TEMPERATURE: 98 F | SYSTOLIC BLOOD PRESSURE: 133 MMHG | BODY MASS INDEX: 29.4 KG/M2 | RESPIRATION RATE: 18 BRPM | DIASTOLIC BLOOD PRESSURE: 74 MMHG | WEIGHT: 210 LBS

## 2024-04-17 DIAGNOSIS — M54.31 SCIATICA OF RIGHT SIDE: ICD-10-CM

## 2024-04-17 DIAGNOSIS — M54.41 CHRONIC BILATERAL LOW BACK PAIN WITH RIGHT-SIDED SCIATICA: ICD-10-CM

## 2024-04-17 DIAGNOSIS — G89.29 CHRONIC BILATERAL LOW BACK PAIN WITH RIGHT-SIDED SCIATICA: ICD-10-CM

## 2024-04-18 RX ORDER — OXYCODONE AND ACETAMINOPHEN 10; 325 MG/1; MG/1
1 TABLET ORAL EVERY 6 HOURS PRN
Qty: 48 TABLET | Refills: 0 | Status: SHIPPED | OUTPATIENT
Start: 2024-04-18 | End: 2024-04-28 | Stop reason: SDUPTHER

## 2024-04-19 ENCOUNTER — HOSPITAL ENCOUNTER (EMERGENCY)
Facility: HOSPITAL | Age: 39
Discharge: HOME | End: 2024-04-19
Attending: FAMILY MEDICINE
Payer: MEDICAID

## 2024-04-19 VITALS
SYSTOLIC BLOOD PRESSURE: 150 MMHG | HEART RATE: 63 BPM | HEIGHT: 71 IN | OXYGEN SATURATION: 99 % | BODY MASS INDEX: 29.4 KG/M2 | TEMPERATURE: 98 F | DIASTOLIC BLOOD PRESSURE: 92 MMHG | RESPIRATION RATE: 18 BRPM | WEIGHT: 210 LBS

## 2024-04-19 DIAGNOSIS — Z51.89 VISIT FOR WOUND CHECK: Primary | ICD-10-CM

## 2024-04-19 PROCEDURE — 99284 EMERGENCY DEPT VISIT MOD MDM: CPT | Mod: ER

## 2024-04-19 RX ORDER — MUPIROCIN 20 MG/G
OINTMENT TOPICAL 3 TIMES DAILY
Qty: 30 G | Refills: 0 | Status: SHIPPED | OUTPATIENT
Start: 2024-04-19

## 2024-04-19 RX ORDER — DOXYCYCLINE 100 MG/1
100 CAPSULE ORAL 2 TIMES DAILY
Qty: 20 CAPSULE | Refills: 0 | Status: SHIPPED | OUTPATIENT
Start: 2024-04-19 | End: 2024-04-29

## 2024-04-19 NOTE — ED PROVIDER NOTES
"Encounter Date: 4/19/2024       History     Chief Complaint   Patient presents with    Wound Check     Pt presents with concerns for incision from discectomy on 04/09/2024.  Pt reports scant clear drainage and loss of last suture.      This is a 39-year-old white male with significant past medical history of bipolar 1 disorder and PTSD and recent diskectomy on 04/09/2024 that presents the ED for a wound check from that procedure.  He states that the suture "popped loose" the other day in his wife wanted him to come and have the area looked at because there is some swelling and some yellow appearing skin that they were concerned could be infection.  The patient denies any pain around the area at this time.  He denies any redness, warmth, or drainage from the site.  He denies any fever.      Review of patient's allergies indicates:  No Known Allergies  Past Medical History:   Diagnosis Date    Bipolar 1 disorder     PTSD (post-traumatic stress disorder)      Past Surgical History:   Procedure Laterality Date    ESOPHAGOGASTRODUODENOSCOPY      LAPAROSCOPIC CHOLECYSTECTOMY N/A 5/28/2020    Procedure: CHOLECYSTECTOMY, LAPAROSCOPIC;  Surgeon: Eduardo Bolden MD;  Location: [facility] OR;  Service: General;  Laterality: N/A;    MINIMALLY INVASIVE SURGICAL REMOVAL OF INTERVERTEBRAL DISC OF SPINE USING MICROSCOPE Right 4/9/2024    Procedure: DISCECTOMY, SPINE, MINIMALLY INVASIVE, USING MICROSCOPE;  Surgeon: Morro Sheffield MD;  Location: [facility] OR;  Service: Neurosurgery;  Laterality: Right;  Procedure:Right L5-S1 microdiscectomy  Length of procedure: 1.5 hours  LOS: 0 nights  Anesthesia:General   Blood;Type and screen  Radiology:C-arm  Bed:[place] [id number] Poster  Position:Prone     Family History   Problem Relation Name Age of Onset    No Known Problems Mother      No Known Problems Father      No Known Problems Sister      No Known Problems Brother       Social History     Tobacco Use    Smoking status: Former     Current " packs/day: 0.00     Average packs/day: 0.3 packs/day for 25.1 years (6.3 ttl pk-yrs)     Types: Cigarettes     Start date:      Quit date: 2024     Years since quittin.2    Smokeless tobacco: Never    Tobacco comments:     Ambulatory referral to Smoking Cessation program.    Substance Use Topics    Alcohol use: Not Currently    Drug use: Not Currently     Review of Systems   Constitutional:  Negative for fever.   Respiratory:  Negative for cough and shortness of breath.    Cardiovascular:  Negative for chest pain and palpitations.   Musculoskeletal:  Positive for back pain.   Skin:  Positive for wound.       Physical Exam     Initial Vitals [24 1521]   BP Pulse Resp Temp SpO2   (!) 150/92 63 18 98.3 °F (36.8 °C) 99 %      MAP       --         Physical Exam    Constitutional: He appears well-developed and well-nourished.   HENT:   Head: Normocephalic and atraumatic.   Cardiovascular:  Normal rate, regular rhythm and normal heart sounds.           Pulmonary/Chest: Breath sounds normal. He has no wheezes.   Musculoskeletal:      Cervical back: Normal.      Thoracic back: Normal.      Lumbar back: No tenderness or bony tenderness. Normal range of motion. No scoliosis.        Back:       Comments: Well healing but still open roughly 2 cm linear and longitudinally running wound noted at L5-S.  There is no surrounding erythema, warmth, tenderness.  There is no active bleeding or purulent drainage noted from the site.  There is granulation tissue noted.  There is no fluctuance or evidence of fluid collection to suggest abscess.     Neurological: He is alert and oriented to person, place, and time.   Psychiatric: He has a normal mood and affect. Thought content normal.         ED Course   Procedures  Labs Reviewed - No data to display       Imaging Results    None          Medications - No data to display  Medical Decision Making  This is a 39-year-old white male with recent diskectomy at L5-S1 that  presents to the ED for a wound check.  The patient states that 1 of his sutures came loose and there was a slight opening to the wound that he wanted evaluated.  His wife was also concerned about possible infection.  On arrival the patient is afebrile with a temperature of 98.3°.  He was also nontoxic-appearing and is not tachycardic.  Differential diagnoses include but are not limited to:  Visit for wound check, cellulitis, abscess, normal healing, anxiety.  Please see physical exam for further details.  I see no evidence of cellulitis or abscess.  I believe the wound is healing normally and appropriately.  I do not believe a new suture is needed.  I do not suspect any emergent or life-threatening neurovascular or osseous disease process or condition.  I do not suspect epidural abscess or sepsis.  As a precaution, the patient will be placed on doxycycline and mupirocin ointment.  He has been instructed to keep the area clean, dry, open to air when possible.  When he will be at work and sweating, he is to keep the area covered with a bandage.  He should have follow up with the surgeon as scheduled.  He should have PCP follow up in the next 2-3 days or return to the ED for worsening.  He verbalized understanding and was agreeable to the treatment plan.                                      Clinical Impression:  Final diagnoses:  [Z51.89] Visit for wound check (Primary)          ED Disposition Condition    Discharge Stable          ED Prescriptions       Medication Sig Dispense Start Date End Date Auth. Provider    doxycycline (VIBRAMYCIN) 100 MG Cap Take 1 capsule (100 mg total) by mouth 2 (two) times daily. for 10 days 20 capsule 4/19/2024 4/29/2024 Sebastien Aburto PA-C    mupirocin (BACTROBAN) 2 % ointment Apply topically 3 (three) times daily. 30 g 4/19/2024 -- Sebastien Aburto PA-C          Follow-up Information       Follow up With Specialties Details Why Contact Info    Ángel Duvall MD Family Medicine  Schedule an appointment as soon as possible for a visit in 2 days  [address], [place] [address]  [place] [address] [phone]      [facility] - Emergency Dept Emergency Medicine  If symptoms worsen [address] [facility] [address] [phone]    Your surgeon   As scheduled              Sebastien Aburto PA-C  04/19/24 1532

## 2024-04-23 ENCOUNTER — OFFICE VISIT (OUTPATIENT)
Dept: NEUROSURGERY | Facility: CLINIC | Age: 39
End: 2024-04-23
Payer: MEDICAID

## 2024-04-23 VITALS
SYSTOLIC BLOOD PRESSURE: 125 MMHG | HEIGHT: 71 IN | BODY MASS INDEX: 29.42 KG/M2 | DIASTOLIC BLOOD PRESSURE: 74 MMHG | WEIGHT: 210.13 LBS | HEART RATE: 77 BPM

## 2024-04-23 DIAGNOSIS — Z98.890 S/P LUMBAR MICRODISCECTOMY: Primary | ICD-10-CM

## 2024-04-23 PROCEDURE — 1159F MED LIST DOCD IN RCRD: CPT | Mod: CPTII,,, | Performed by: PHYSICIAN ASSISTANT

## 2024-04-23 PROCEDURE — 3078F DIAST BP <80 MM HG: CPT | Mod: CPTII,,, | Performed by: PHYSICIAN ASSISTANT

## 2024-04-23 PROCEDURE — 1160F RVW MEDS BY RX/DR IN RCRD: CPT | Mod: CPTII,,, | Performed by: PHYSICIAN ASSISTANT

## 2024-04-23 PROCEDURE — 99024 POSTOP FOLLOW-UP VISIT: CPT | Mod: ,,, | Performed by: PHYSICIAN ASSISTANT

## 2024-04-23 PROCEDURE — 99213 OFFICE O/P EST LOW 20 MIN: CPT | Mod: PBBFAC,PN | Performed by: PHYSICIAN ASSISTANT

## 2024-04-23 PROCEDURE — 3074F SYST BP LT 130 MM HG: CPT | Mod: CPTII,,, | Performed by: PHYSICIAN ASSISTANT

## 2024-04-23 PROCEDURE — 99999 PR PBB SHADOW E&M-EST. PATIENT-LVL III: CPT | Mod: PBBFAC,,, | Performed by: PHYSICIAN ASSISTANT

## 2024-04-24 PROBLEM — Z98.890 S/P LUMBAR MICRODISCECTOMY: Status: ACTIVE | Noted: 2024-04-24

## 2024-04-24 NOTE — PROGRESS NOTES
"  Postoperative Wound Check:    Date of surgery 04/09/2024     Preop diagnosis   1. Right L5-S1 disc herniation with radiculopathy     Postop diagnosis   Same     Surgery   1. Right L5-S1 microdiskectomy     Surgeon  Morro Sheffield MD          HPI:    Overall he is doing well.  He no longer has right leg pain.  He has mild achy pain around the incision to the right hip.  He went to the emergency room because he states his wife was concerned about his incision.  They put him on doxycycline and Bactroban ointment.  Denies any drainage.  He just started the antibiotics recently.    No redness, swelling, or drainage, and no fever, chills, or sweats.      Physical Exam:    Vitals:    04/23/24 1504   BP: 125/74   Pulse: 77   Weight: 95.3 kg (210 lb 1.6 oz)   Height: 5' 11" (1.803 m)   PainSc:   4   PainLoc: Hip         Incision:  Wound edges are approximated except the very superior portion.  No redness, swelling, or drainage.      Diagnosis:     1. S/P lumbar microdiscectomy              Plan:              -Chloroprep applied  -continue walking   -continue activity restriction  -finish full course of doxycycline antibiotics prescribed by the ER        The patient will follow up with me in 4 weeks for the 6 week po fu    Monique Eddy, [facility], PA-C  Neurosurgery  [facility]        "

## 2024-04-28 ENCOUNTER — NURSE TRIAGE (OUTPATIENT)
Dept: ADMINISTRATIVE | Facility: CLINIC | Age: 39
End: 2024-04-28
Payer: MEDICAID

## 2024-04-28 ENCOUNTER — HOSPITAL ENCOUNTER (EMERGENCY)
Facility: HOSPITAL | Age: 39
Discharge: HOME | End: 2024-04-28
Attending: EMERGENCY MEDICINE
Payer: MEDICAID

## 2024-04-28 VITALS
HEIGHT: 71 IN | BODY MASS INDEX: 29.94 KG/M2 | WEIGHT: 213.88 LBS | HEART RATE: 76 BPM | RESPIRATION RATE: 16 BRPM | TEMPERATURE: 99 F | SYSTOLIC BLOOD PRESSURE: 134 MMHG | OXYGEN SATURATION: 99 % | DIASTOLIC BLOOD PRESSURE: 76 MMHG

## 2024-04-28 DIAGNOSIS — G89.29 CHRONIC BILATERAL LOW BACK PAIN WITH RIGHT-SIDED SCIATICA: ICD-10-CM

## 2024-04-28 DIAGNOSIS — M54.31 SCIATICA OF RIGHT SIDE: ICD-10-CM

## 2024-04-28 DIAGNOSIS — S76.011A STRAIN OF RIGHT HIP, INITIAL ENCOUNTER: ICD-10-CM

## 2024-04-28 DIAGNOSIS — M54.41 CHRONIC BILATERAL LOW BACK PAIN WITH RIGHT-SIDED SCIATICA: ICD-10-CM

## 2024-04-28 DIAGNOSIS — S76.211A INGUINAL STRAIN, RIGHT, INITIAL ENCOUNTER: Primary | ICD-10-CM

## 2024-04-28 DIAGNOSIS — M25.559 HIP PAIN: ICD-10-CM

## 2024-04-28 LAB
ALBUMIN SERPL BCP-MCNC: 3.8 G/DL (ref 3.5–5.2)
ALP SERPL-CCNC: 98 U/L (ref 55–135)
ALT SERPL W/O P-5'-P-CCNC: 24 U/L (ref 10–44)
ANION GAP SERPL CALC-SCNC: 12 MMOL/L (ref 8–16)
AST SERPL-CCNC: 24 U/L (ref 10–40)
BACTERIA #/AREA URNS HPF: NORMAL /HPF
BASOPHILS # BLD AUTO: 0.03 K/UL (ref 0–0.2)
BASOPHILS NFR BLD: 0.5 % (ref 0–1.9)
BILIRUB SERPL-MCNC: 0.3 MG/DL (ref 0.1–1)
BILIRUB UR QL STRIP: NEGATIVE
BUN SERPL-MCNC: 12 MG/DL (ref 6–20)
CALCIUM SERPL-MCNC: 9.2 MG/DL (ref 8.7–10.5)
CHLORIDE SERPL-SCNC: 109 MMOL/L (ref 95–110)
CLARITY UR: CLEAR
CO2 SERPL-SCNC: 22 MMOL/L (ref 23–29)
COLOR UR: YELLOW
CREAT SERPL-MCNC: 0.8 MG/DL (ref 0.5–1.4)
DIFFERENTIAL METHOD BLD: ABNORMAL
EOSINOPHIL # BLD AUTO: 0.2 K/UL (ref 0–0.5)
EOSINOPHIL NFR BLD: 3.2 % (ref 0–8)
ERYTHROCYTE [DISTWIDTH] IN BLOOD BY AUTOMATED COUNT: 12 % (ref 11.5–14.5)
EST. GFR  (NO RACE VARIABLE): >60 ML/MIN/1.73 M^2
GLUCOSE SERPL-MCNC: 91 MG/DL (ref 70–110)
GLUCOSE UR QL STRIP: NEGATIVE
HCT VFR BLD AUTO: 38.4 % (ref 40–54)
HGB BLD-MCNC: 13.4 G/DL (ref 14–18)
HGB UR QL STRIP: ABNORMAL
IMM GRANULOCYTES # BLD AUTO: 0.01 K/UL (ref 0–0.04)
IMM GRANULOCYTES NFR BLD AUTO: 0.2 % (ref 0–0.5)
KETONES UR QL STRIP: NEGATIVE
LEUKOCYTE ESTERASE UR QL STRIP: ABNORMAL
LYMPHOCYTES # BLD AUTO: 2.2 K/UL (ref 1–4.8)
LYMPHOCYTES NFR BLD: 37.1 % (ref 18–48)
MCH RBC QN AUTO: 31.9 PG (ref 27–31)
MCHC RBC AUTO-ENTMCNC: 34.9 G/DL (ref 32–36)
MCV RBC AUTO: 91 FL (ref 82–98)
MICROSCOPIC COMMENT: NORMAL
MONOCYTES # BLD AUTO: 0.5 K/UL (ref 0.3–1)
MONOCYTES NFR BLD: 9 % (ref 4–15)
NEUTROPHILS # BLD AUTO: 3 K/UL (ref 1.8–7.7)
NEUTROPHILS NFR BLD: 50 % (ref 38–73)
NITRITE UR QL STRIP: NEGATIVE
NRBC BLD-RTO: 0 /100 WBC
PH UR STRIP: 7 [PH] (ref 5–8)
PLATELET # BLD AUTO: 266 K/UL (ref 150–450)
PMV BLD AUTO: 8.8 FL (ref 9.2–12.9)
POTASSIUM SERPL-SCNC: 3.8 MMOL/L (ref 3.5–5.1)
PROT SERPL-MCNC: 7.2 G/DL (ref 6–8.4)
PROT UR QL STRIP: ABNORMAL
RBC # BLD AUTO: 4.2 M/UL (ref 4.6–6.2)
RBC #/AREA URNS HPF: 2 /HPF (ref 0–4)
SODIUM SERPL-SCNC: 143 MMOL/L (ref 136–145)
SP GR UR STRIP: 1.03 (ref 1–1.03)
SQUAMOUS #/AREA URNS HPF: 1 /HPF
URN SPEC COLLECT METH UR: ABNORMAL
UROBILINOGEN UR STRIP-ACNC: NEGATIVE EU/DL
WBC # BLD AUTO: 5.98 K/UL (ref 3.9–12.7)
WBC #/AREA URNS HPF: 3 /HPF (ref 0–5)

## 2024-04-28 PROCEDURE — 25500020 PHARM REV CODE 255: Performed by: EMERGENCY MEDICINE

## 2024-04-28 PROCEDURE — 25000003 PHARM REV CODE 250

## 2024-04-28 PROCEDURE — 80053 COMPREHEN METABOLIC PANEL: CPT | Performed by: EMERGENCY MEDICINE

## 2024-04-28 PROCEDURE — 99285 EMERGENCY DEPT VISIT HI MDM: CPT | Mod: 25

## 2024-04-28 PROCEDURE — 81000 URINALYSIS NONAUTO W/SCOPE: CPT | Performed by: EMERGENCY MEDICINE

## 2024-04-28 PROCEDURE — 96372 THER/PROPH/DIAG INJ SC/IM: CPT | Mod: 59

## 2024-04-28 PROCEDURE — 63600175 PHARM REV CODE 636 W HCPCS

## 2024-04-28 PROCEDURE — 85025 COMPLETE CBC W/AUTO DIFF WBC: CPT | Performed by: EMERGENCY MEDICINE

## 2024-04-28 RX ORDER — KETOROLAC TROMETHAMINE 30 MG/ML
15 INJECTION, SOLUTION INTRAMUSCULAR; INTRAVENOUS
Status: COMPLETED | OUTPATIENT
Start: 2024-04-28 | End: 2024-04-28

## 2024-04-28 RX ORDER — NAPROXEN 500 MG/1
500 TABLET ORAL 2 TIMES DAILY WITH MEALS
Qty: 20 TABLET | Refills: 0 | Status: SHIPPED | OUTPATIENT
Start: 2024-04-28

## 2024-04-28 RX ORDER — LIDOCAINE 50 MG/G
1 PATCH TOPICAL
Status: DISCONTINUED | OUTPATIENT
Start: 2024-04-28 | End: 2024-04-28 | Stop reason: HOSPADM

## 2024-04-28 RX ORDER — METHOCARBAMOL 750 MG/1
1500 TABLET, FILM COATED ORAL 3 TIMES DAILY
Qty: 30 TABLET | Refills: 0 | Status: SHIPPED | OUTPATIENT
Start: 2024-04-28 | End: 2024-05-03

## 2024-04-28 RX ORDER — OXYCODONE AND ACETAMINOPHEN 10; 325 MG/1; MG/1
1 TABLET ORAL EVERY 6 HOURS PRN
Qty: 48 TABLET | Refills: 0 | Status: SHIPPED | OUTPATIENT
Start: 2024-04-28 | End: 2024-05-11 | Stop reason: SDUPTHER

## 2024-04-28 RX ORDER — HYDROCODONE BITARTRATE AND ACETAMINOPHEN 5; 325 MG/1; MG/1
1 TABLET ORAL
Status: COMPLETED | OUTPATIENT
Start: 2024-04-28 | End: 2024-04-28

## 2024-04-28 RX ADMIN — HYDROCODONE BITARTRATE AND ACETAMINOPHEN 1 TABLET: 5; 325 TABLET ORAL at 06:04

## 2024-04-28 RX ADMIN — KETOROLAC TROMETHAMINE 15 MG: 30 INJECTION, SOLUTION INTRAMUSCULAR at 06:04

## 2024-04-28 RX ADMIN — IOHEXOL 75 ML: 350 INJECTION, SOLUTION INTRAVENOUS at 07:04

## 2024-04-28 RX ADMIN — LIDOCAINE 1 PATCH: 700 PATCH TOPICAL at 06:04

## 2024-04-28 NOTE — TELEPHONE ENCOUNTER
Pt reports pain 8/10 in right hip/thigh. Reports pain is constant and new. Advised, per protocol. Verbalizes understanding.     Reason for Disposition   Sounds like a serious complication to the triager    Additional Information   Negative: Sounds like a life-threatening emergency to the triager   Negative: [1] Widespread rash AND [2] bright red, sunburn-like    Protocols used: Post-Op Symptoms and [id number]-TA

## 2024-04-28 NOTE — ED NOTES
Patient reports slight improvement in pain. CMS intact. Patient able to lay down in bed. Attempting to get comfortable.
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
## 2024-04-28 NOTE — ED PROVIDER NOTES
Encounter Date: 4/28/2024       History     Chief Complaint   Patient presents with    Hip Pain     Pt reports he had a slip and fall yesterday on right hip. States he is having right hip pain radiating to his groin and down to his right knee. Had a recent vasectomy 4/9 and has also been dealing with right hip pain since his surgery.      39 year old male with History of chronic back pain and sciatica, recent diskectomy earlier this month with prolonged post operative back and groin pain presents after a fall yesterday where is leg gave out on the stairs.  Complaining of pain to right groin, hip, low back.  States he was treated recently for post op infection with doxycycline.      The history is provided by the patient.     Review of patient's allergies indicates:  No Known Allergies  Past Medical History:   Diagnosis Date    Bipolar 1 disorder     PTSD (post-traumatic stress disorder)      Past Surgical History:   Procedure Laterality Date    ESOPHAGOGASTRODUODENOSCOPY      LAPAROSCOPIC CHOLECYSTECTOMY N/A 5/28/2020    Procedure: CHOLECYSTECTOMY, LAPAROSCOPIC;  Surgeon: Eduardo Bolden MD;  Location: [facility] OR;  Service: General;  Laterality: N/A;    MINIMALLY INVASIVE SURGICAL REMOVAL OF INTERVERTEBRAL DISC OF SPINE USING MICROSCOPE Right 4/9/2024    Procedure: DISCECTOMY, SPINE, MINIMALLY INVASIVE, USING MICROSCOPE;  Surgeon: Morro Sheffield MD;  Location: [facility] OR;  Service: Neurosurgery;  Laterality: Right;  Procedure:Right L5-S1 microdiscectomy  Length of procedure: 1.5 hours  LOS: 0 nights  Anesthesia:General   Blood;Type and screen  Radiology:C-arm  Bed:[place] [id number] Poster  Position:Prone     Family History   Problem Relation Name Age of Onset    No Known Problems Mother      No Known Problems Father      No Known Problems Sister      No Known Problems Brother       Social History     Tobacco Use    Smoking status: Former     Current packs/day: 0.00     Average packs/day: 0.3 packs/day for 25.1 years (6.3  ttl pk-yrs)     Types: Cigarettes     Start date:      Quit date: 2024     Years since quittin.2    Smokeless tobacco: Never    Tobacco comments:     Ambulatory referral to Smoking Cessation program.    Substance Use Topics    Alcohol use: Not Currently    Drug use: Not Currently     Review of Systems    Physical Exam     Initial Vitals [24 1726]   BP Pulse Resp Temp SpO2   (!) 189/100 110 18 98.5 °F (36.9 °C) 98 %      MAP       --         Physical Exam    Nursing note and vitals reviewed.  Constitutional: He appears well-developed and well-nourished. No distress.   HENT:   Head: Normocephalic and atraumatic.   Mouth/Throat: Oropharynx is clear and moist.   Eyes: Conjunctivae and EOM are normal. Pupils are equal, round, and reactive to light.   Neck: Neck supple.   Normal range of motion.  Cardiovascular:  Normal rate, regular rhythm and intact distal pulses.           Distal pulses equal bilaterally   Pulmonary/Chest: No stridor. No respiratory distress.   Abdominal: Abdomen is soft. He exhibits no distension. There is no abdominal tenderness.   Tenderness along right inguinal area without obvious large herniation   Genitourinary:    Genitourinary Comments: Normal rectal tone.  No saddle anesthesia.  No testicular tenderness or swelling     Musculoskeletal:         General: Normal range of motion.      Cervical back: Normal range of motion and neck supple.      Comments: Moving all extremities with grossly normal strength.  Tenderness over lumbar spine and surgical site without erythema or drainage from the wound.  Normal passive ROM of right hip with increased pain on internal rotation.     Neurological: He is alert and oriented to person, place, and time.   CN 2-12 appear grossly intact.  No saddle anesthesia   Skin: Skin is warm and dry.   Psychiatric: He has a normal mood and affect.         ED Course   Procedures  Labs Reviewed   CBC W/ AUTO DIFFERENTIAL - Abnormal; Notable for the following  components:       Result Value    RBC 4.20 (*)     Hemoglobin 13.4 (*)     Hematocrit 38.4 (*)     MCH 31.9 (*)     MPV 8.8 (*)     All other components within normal limits   COMPREHENSIVE METABOLIC PANEL - Abnormal; Notable for the following components:    CO2 22 (*)     All other components within normal limits   URINALYSIS, REFLEX TO URINE CULTURE - Abnormal; Notable for the following components:    Protein, UA Trace (*)     Occult Blood UA 1+ (*)     Leukocytes, UA 2+ (*)     All other components within normal limits    Narrative:     Specimen Source->Urine   URINALYSIS MICROSCOPIC    Narrative:     Specimen Source->Urine          Imaging Results              CT Lumbar Spine With Contrast (Final result)  Result time 04/28/24 19:38:57      Final result by Chalo Calloway MD (04/28/24 19:38:57)                   Impression:      Postop changes of hemilaminectomy and micro discectomy on the right at L5 with no unsuspected postop findings.    No evidence of abnormal enhancing mass or membrane, hematoma or abscess.    No central canal stenosis.    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      Electronically signed by: Chalo Calloway  Date:    04/28/2024  Time:    19:38               Narrative:    EXAMINATION:  CT LUMBAR SPINE WITH CONTRAST    CLINICAL HISTORY:  Low back pain, cauda equina syndrome suspected;Low back pain, infection suspected;    TECHNIQUE:  Axial CT images of the lumbar spine were obtained without contrast.  Sagittal and coronal reformatted images were performed.    COMPARISON:  None    FINDINGS:  Alignment is anatomic.  Mild spondylosis is noted especially in the lower thoracic region and at the L5-S1 level with mild disc space narrowing at L4-5 and L5-S1.    Right hemilaminectomy at L5.  No unsuspected enhancing tissue The remainder of the soft tissues demonstrate no abnormal enhancing mass or membrane.  No stenosis at other level.  No subcutaneous or paraspinous mass or fluid  collection.  Note is made of duplicated upper tract in the left  system at least to the sacral promontory with the distal ureter is not visualized on this exam.  The remainder of the paraspinous soft tissues in the retroperitoneum appear normal with no abnormal enhancing mass or membrane is specially in the psoas muscles.                                       CT Pelvis With IV Contrast NO Oral Contrast (Final result)  Result time 04/28/24 19:49:57      Final result by Chalo Calloway MD (04/28/24 19:49:57)                   Impression:      No acute findings evident the pelvis.      Electronically signed by: Chalo Calloway  Date:    04/28/2024  Time:    19:49               Narrative:    EXAMINATION:  CT PELVIS WITH IV CONTRAST    CLINICAL HISTORY:  Pelvic trauma;Soft tissue infection suspected, pelvis, xray done;    TECHNIQUE:  Axial CT images of the pelvis were obtained without contrast.  Sagittal coronal reformatted images were performed.    COMPARISON:  Lumbar spine, same day    FINDINGS:  Bilateral CAM deformity left greater than right again noted.  No fracture or bony destructive process.  No fluid collection.  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  Phlebolith noted in the pelvis on the left.    No fracture or bony destructive process.  No hematoma.  No fluid collection.                                       X-Ray Lumbar Spine Ap And Lateral (Final result)  Result time 04/28/24 18:32:51      Final result by Slim Magana MD (04/28/24 18:32:51)                   Impression:      No acute process.      Electronically signed by: Slim Magana MD  Date:    04/28/2024  Time:    18:32               Narrative:    EXAMINATION:  XR LUMBAR SPINE AP AND LATERAL    CLINICAL HISTORY:  back pain    TECHNIQUE:  AP, lateral and spot images were performed of the lumbar spine.    COMPARISON:  02/26/2024.    FINDINGS:  The lumbar alignment is within normal limits.  The vertebral  body heights are maintained.  The posterior elements are unremarkable.  There is mild intervertebral disc space narrowing the L5-S1 level.  The remaining intervertebral disc spaces are unremarkable.  The sacroiliac joints are unremarkable.    The paraspinal soft tissues are unremarkable.    There is no evidence acute fracture or listhesis of the lumbar spine.                                       X-Ray Hip 2 or 3 views Right (with Pelvis when performed) (Final result)  Result time 04/28/24 18:27:03      Final result by Ion Diaz MD (04/28/24 18:27:03)                   Impression:      1. No acute displaced fracture or dislocation of the right hip noting chronic degenerative change.      Electronically signed by: Ion Diaz MD  Date:    04/28/2024  Time:    18:27               Narrative:    EXAMINATION:  XR HIP WITH PELVIS WHEN PERFORMED, 2 OR 3  VIEWS RIGHT    CLINICAL HISTORY:  Pain in unspecified hip    TECHNIQUE:  AP view of the pelvis and frog leg lateral view of the right hip were performed.    COMPARISON:  03/25/2022    FINDINGS:  Two views right hip.    The bilateral sacroiliac joints are intact.  The pubic symphysis is intact.  The bilateral femoral heads are in appropriate relationship with their respective acetabula noting chronic changes of the femoral heads.                                       Medications   LIDOcaine 5 % patch 1 patch (1 patch Transdermal Patch Applied 4/28/24 1805)   HYDROcodone-acetaminophen 5-325 mg per tablet 1 tablet (1 tablet Oral Given 4/28/24 1804)   ketorolac injection 15 mg (15 mg Intramuscular Given 4/28/24 1804)   iohexoL (OMNIPAQUE 350) injection 75 mL (75 mLs Intravenous Given 4/28/24 1912)   iohexoL (OMNIPAQUE 350) injection 75 mL (75 mLs Intravenous Given 4/28/24 1909)     Medical Decision Making  Well appearing non toxic normal vitals.  Evaluate for post op infection, trauma such as pelvis or hip fracture, UTI.  Possible hernia or inguinal strain.       Amount and/or Complexity of Data Reviewed  External Data Reviewed: notes.     Details: Diskectomy 4/9    ER visit 4/19 for wound dehiscence, prescribed doxycycline  Labs: ordered.  Radiology: ordered.    Risk  Prescription drug management.               ED Course as of 04/28/24 1954   Sun Apr 28, 2024 1946 CBC reassuring no leukocytosis mild anemia only.  Essentially normal chemistry profile.  Urinalysis positive for blood and leukocytes rare bacteria unlikely UTI [AP]   1952 CT pelvis reviewed independently agree with radiology interpretation, no acute emergent findings    CT Lumbar reviewed independently and agree with radiology interpretation, no post op concerns [AP]      ED Course User Index  [AP] Freddy Laws,                            Clinical Impression:  Final diagnoses:  [M25.559] Hip pain  [S76.211A] Inguinal strain, right, initial encounter (Primary)  [S76.011A] Strain of right hip, initial encounter          ED Disposition Condition    Discharge Stable          ED Prescriptions       Medication Sig Dispense Start Date End Date Auth. Provider    naproxen (NAPROSYN) 500 MG tablet Take 1 tablet (500 mg total) by mouth 2 (two) times daily with meals. 20 tablet 4/28/2024 -- Freddy Laws DO    methocarbamoL (ROBAXIN) 750 MG Tab Take 2 tablets (1,500 mg total) by mouth 3 (three) times daily. for 5 days 30 tablet 4/28/2024 5/3/2024 Freddy Laws,           Follow-up Information       Follow up With Specialties Details Why Contact Info    Ángel Duvall MD Family Medicine Schedule an appointment as soon as possible for a visit   [address], [facility] [address]  [facility] [address] [phone]      [facility] - Emergency Dept Emergency Medicine  If symptoms worsen [address]  [facility] [address] [phone]             Freddy Laws DO  04/28/24 1954
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
## 2024-05-04 DIAGNOSIS — F45.41 PAIN AGGRAVATED BY ANXIETY: ICD-10-CM

## 2024-05-04 DIAGNOSIS — F41.9 PAIN AGGRAVATED BY ANXIETY: ICD-10-CM

## 2024-05-06 RX ORDER — DIAZEPAM 5 MG/1
5 TABLET ORAL EVERY 8 HOURS PRN
Qty: 21 TABLET | Refills: 0 | Status: SHIPPED | OUTPATIENT
Start: 2024-05-06 | End: 2024-06-05

## 2024-05-11 DIAGNOSIS — G89.29 CHRONIC BILATERAL LOW BACK PAIN WITH RIGHT-SIDED SCIATICA: ICD-10-CM

## 2024-05-11 DIAGNOSIS — M54.41 CHRONIC BILATERAL LOW BACK PAIN WITH RIGHT-SIDED SCIATICA: ICD-10-CM

## 2024-05-11 DIAGNOSIS — M54.31 SCIATICA OF RIGHT SIDE: ICD-10-CM

## 2024-05-11 RX ORDER — OXYCODONE AND ACETAMINOPHEN 10; 325 MG/1; MG/1
1 TABLET ORAL EVERY 6 HOURS PRN
Qty: 48 TABLET | Refills: 0 | Status: SHIPPED | OUTPATIENT
Start: 2024-05-11 | End: 2024-05-23 | Stop reason: SDUPTHER

## 2024-05-23 DIAGNOSIS — G89.29 CHRONIC BILATERAL LOW BACK PAIN WITH RIGHT-SIDED SCIATICA: ICD-10-CM

## 2024-05-23 DIAGNOSIS — M54.31 SCIATICA OF RIGHT SIDE: ICD-10-CM

## 2024-05-23 DIAGNOSIS — M54.41 CHRONIC BILATERAL LOW BACK PAIN WITH RIGHT-SIDED SCIATICA: ICD-10-CM

## 2024-05-23 RX ORDER — OXYCODONE AND ACETAMINOPHEN 10; 325 MG/1; MG/1
1 TABLET ORAL EVERY 6 HOURS PRN
Qty: 48 TABLET | Refills: 0 | Status: SHIPPED | OUTPATIENT
Start: 2024-05-23

## 2024-06-03 ENCOUNTER — OFFICE VISIT (OUTPATIENT)
Dept: FAMILY MEDICINE | Facility: HOSPITAL | Age: 39
End: 2024-06-03
Payer: MEDICAID

## 2024-06-03 VITALS
WEIGHT: 208.56 LBS | HEART RATE: 80 BPM | BODY MASS INDEX: 29.2 KG/M2 | HEIGHT: 71 IN | SYSTOLIC BLOOD PRESSURE: 130 MMHG | DIASTOLIC BLOOD PRESSURE: 85 MMHG

## 2024-06-03 DIAGNOSIS — Z11.59 ENCOUNTER FOR HEPATITIS C SCREENING TEST FOR LOW RISK PATIENT: ICD-10-CM

## 2024-06-03 DIAGNOSIS — Z13.6 ENCOUNTER FOR SCREENING FOR CARDIOVASCULAR DISORDERS: ICD-10-CM

## 2024-06-03 DIAGNOSIS — Z13.1 ENCOUNTER FOR SCREENING FOR DIABETES MELLITUS: ICD-10-CM

## 2024-06-03 DIAGNOSIS — F39 MOOD DISORDER: Primary | ICD-10-CM

## 2024-06-03 DIAGNOSIS — F31.9 BIPOLAR 1 DISORDER: ICD-10-CM

## 2024-06-03 PROCEDURE — 99214 OFFICE O/P EST MOD 30 MIN: CPT | Performed by: STUDENT IN AN ORGANIZED HEALTH CARE EDUCATION/TRAINING PROGRAM

## 2024-06-03 RX ORDER — QUETIAPINE FUMARATE 100 MG/1
100 TABLET, FILM COATED ORAL 2 TIMES DAILY
Qty: 60 TABLET | Refills: 11 | Status: SHIPPED | OUTPATIENT
Start: 2024-06-03 | End: 2024-06-05 | Stop reason: SDUPTHER
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
## 2024-06-04 ENCOUNTER — PATIENT MESSAGE (OUTPATIENT)
Dept: NEUROSURGERY | Facility: CLINIC | Age: 39
End: 2024-06-04
Payer: MEDICAID

## 2024-06-05 ENCOUNTER — PATIENT MESSAGE (OUTPATIENT)
Dept: FAMILY MEDICINE | Facility: HOSPITAL | Age: 39
End: 2024-06-05
Payer: MEDICAID

## 2024-06-05 ENCOUNTER — PATIENT MESSAGE (OUTPATIENT)
Dept: RESEARCH | Facility: OTHER | Age: 39
End: 2024-06-05
Payer: MEDICAID

## 2024-06-05 DIAGNOSIS — F31.9 BIPOLAR 1 DISORDER: ICD-10-CM

## 2024-06-05 DIAGNOSIS — F39 MOOD DISORDER: ICD-10-CM

## 2024-06-05 RX ORDER — QUETIAPINE FUMARATE 100 MG/1
100 TABLET, FILM COATED ORAL 2 TIMES DAILY
Qty: 60 TABLET | Refills: 11 | Status: SHIPPED | OUTPATIENT
Start: 2024-06-05 | End: 2025-06-05

## 2024-06-11 ENCOUNTER — OFFICE VISIT (OUTPATIENT)
Dept: FAMILY MEDICINE | Facility: HOSPITAL | Age: 39
End: 2024-06-11
Payer: MEDICAID

## 2024-06-11 VITALS
WEIGHT: 216.06 LBS | BODY MASS INDEX: 30.25 KG/M2 | SYSTOLIC BLOOD PRESSURE: 126 MMHG | HEIGHT: 71 IN | HEART RATE: 97 BPM | DIASTOLIC BLOOD PRESSURE: 89 MMHG

## 2024-06-11 DIAGNOSIS — F31.9 BIPOLAR 1 DISORDER: Primary | ICD-10-CM

## 2024-06-11 PROCEDURE — 99214 OFFICE O/P EST MOD 30 MIN: CPT | Performed by: STUDENT IN AN ORGANIZED HEALTH CARE EDUCATION/TRAINING PROGRAM

## 2024-06-11 NOTE — PROGRESS NOTES
PROGRESS NOTE  [facility] FAMILY MEDICINE    Subjective:       Patient ID: Daniel Behlar is a 39 y.o. male.    Chief Complaint: PAPERWORK and Follow-up      Mr. Behlar is a 38 year old male with past medical history of Biplar I Disorder, PTSD, and sciatica presenting to family medicine clinic for follow up his Bipolar disorder and fill out paperwork.  Patient is feeling much improved stable on current doses Seroquel, will be seeing psychiatrist with [facility] this week as well as establish with therapist for weekly psychotherapy this week.  Patient will also be going to [place] next week for 2 weeks of intensive outpatient rehab for his substance use.  Patient is feeling hopeful about his treatment and supported by his family.  Filled out [facility] paperwork for his time during his previous manic episodes as well as for his upcoming rehab.      Review of Systems   Constitutional:  Negative for activity change, appetite change, chills and unexpected weight change.   HENT:  Negative for congestion, ear pain, facial swelling, hearing loss and rhinorrhea.    Eyes:  Negative for discharge and redness.   Respiratory:  Negative for cough and shortness of breath.    Cardiovascular:  Negative for chest pain and leg swelling.   Gastrointestinal:  Negative for abdominal distention and abdominal pain.   Genitourinary:  Negative for dysuria and flank pain.   Musculoskeletal: Negative.  Negative for back pain and myalgias.   Skin: Negative.    Neurological:  Negative for facial asymmetry, speech difficulty, weakness and headaches.   Psychiatric/Behavioral:  Negative for agitation and confusion.        Objective:      Vitals:    06/11/24 1105   BP: 126/89   Pulse: 97     Body mass index is 30.13 kg/m².  Physical Exam  Vitals and nursing note reviewed.   Constitutional:       Appearance: Normal appearance.   HENT:      Head: Normocephalic and atraumatic.   Eyes:      Pupils: Pupils are equal, round, and reactive to light.    Cardiovascular:      Rate and Rhythm: Normal rate and regular rhythm.      Heart sounds: Normal heart sounds.   Pulmonary:      Breath sounds: Normal breath sounds.   Abdominal:      General: Abdomen is flat.      Palpations: Abdomen is soft.   Musculoskeletal:         General: No swelling or tenderness.      Cervical back: Normal range of motion.   Skin:     General: Skin is warm.   Neurological:      General: No focal deficit present.      Mental Status: He is alert.   Psychiatric:         Mood and Affect: Mood normal.         Assessment:       1. Bipolar 1 disorder        39-year-old male with above past medical history here for paperwork and follow up on his bipolar 1 disorder doing much better  Plan:       Bipolar 1 disorder    Filled out [facility] paperwork, encouraged patient to keep appointments with Psychiatry, therapist, rehab.    Follow up in: 1 month        Ángel Duvall MD, MPH  [facility] Family Medicine, PGY-3    This note was partially created using [employer] Voice Recognition software. Typographical and content errors may occur with this process. While efforts are made to detect and correct such errors, in some cases errors will persist. For this reason, wording in this document should be considered in the proper context and not strictly verbatim.

## 2024-06-12 NOTE — PROGRESS NOTES
I assume primary medical responsibility for this patient. I have reviewed the history, physical, and assessment & treatment plan with the resident and agree that the care is reasonable and necessary. This service has been performed by a resident without the presence of a teaching physician under the primary care exception. If necessary, an addendum of additional findings or evaluation beyond the resident documentation will be noted below.        Barber Shannon Jr., DO    \[facility]\"" Family Medicine

## 2024-06-16 ENCOUNTER — HOSPITAL ENCOUNTER (EMERGENCY)
Facility: HOSPITAL | Age: 39
Discharge: HOME | End: 2024-06-16
Attending: EMERGENCY MEDICINE
Payer: MEDICAID

## 2024-06-16 VITALS
BODY MASS INDEX: 30.1 KG/M2 | RESPIRATION RATE: 18 BRPM | SYSTOLIC BLOOD PRESSURE: 144 MMHG | OXYGEN SATURATION: 95 % | TEMPERATURE: 99 F | WEIGHT: 215 LBS | HEIGHT: 71 IN | HEART RATE: 112 BPM | DIASTOLIC BLOOD PRESSURE: 95 MMHG

## 2024-06-16 DIAGNOSIS — Z76.0 ENCOUNTER FOR MEDICATION REFILL: Primary | ICD-10-CM

## 2024-06-16 DIAGNOSIS — F31.9 BIPOLAR AFFECTIVE DISORDER, REMISSION STATUS UNSPECIFIED: ICD-10-CM

## 2024-06-16 PROCEDURE — 99283 EMERGENCY DEPT VISIT LOW MDM: CPT

## 2024-06-16 PROCEDURE — 25000003 PHARM REV CODE 250: Performed by: EMERGENCY MEDICINE

## 2024-06-16 RX ORDER — QUETIAPINE FUMARATE 100 MG/1
100 TABLET, FILM COATED ORAL ONCE
Status: COMPLETED | OUTPATIENT
Start: 2024-06-16 | End: 2024-06-16

## 2024-06-16 RX ADMIN — QUETIAPINE FUMARATE 100 MG: 100 TABLET ORAL at 07:06

## 2024-06-17 NOTE — DISCHARGE INSTRUCTIONS
Call your psychiatrist tomorrow  Return to the emergency department if you feel that you need to be seen or are concerned for your well being
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
## (undated) DEVICE — TIP SUCTION IRRIGATOR

## (undated) DEVICE — GLOVE BIOGEL ECLIPSE SZ 7

## (undated) DEVICE — COVER TABLE HVY DTY 60X90IN

## (undated) DEVICE — DRESSING SURGICAL 1/2X1/2

## (undated) DEVICE — DRAPE STERI-DRAPE 1000 17X11IN

## (undated) DEVICE — SUT MONOCRYL 3-0 PS-2 UND

## (undated) DEVICE — SEE MEDLINE ITEM 156952

## (undated) DEVICE — ELECTRODE REM PLYHSV RETURN 9

## (undated) DEVICE — IRRIGATOR ENDOSCOPY DISP.

## (undated) DEVICE — DRAPE C-ARM/MOBILE XRAY 44X80

## (undated) DEVICE — CORD BIPOLAR 12 FOOT

## (undated) DEVICE — GLOVE SURGICAL LATEX SZ 7

## (undated) DEVICE — HEMOSTAT SURGICEL 4X8IN

## (undated) DEVICE — DRAPE LAP T SHT W/ INSTR PAD

## (undated) DEVICE — DRESSING TELFA N ADH 3X8

## (undated) DEVICE — TROCAR ENDOPATH XCEL 5MM 7.5CM

## (undated) DEVICE — SCISSOR 5MMX35CM DIRECT DRIVE

## (undated) DEVICE — DRESSING AQUACEL FOAM 4X4IN

## (undated) DEVICE — SUT MCRYL PLUS 4-0 PS2 27IN

## (undated) DEVICE — TROCAR ENDOPATH XCEL 11MM 10CM

## (undated) DEVICE — STRIP MEDI WND CLSR 1/2X4IN

## (undated) DEVICE — DRAPE LEICA MICROSCOPE 48X120

## (undated) DEVICE — APPLIER CLIP ENDO MED/LG 10MM

## (undated) DEVICE — SUT VICRYL 2 0 CT 2

## (undated) DEVICE — TROCAR ENDOPATH XCEL 5X75MM

## (undated) DEVICE — SUT SILK 5-0 BLK BR TF-5 18

## (undated) DEVICE — NDL HYPO REG 25G X 1 1/2

## (undated) DEVICE — COVER OVERHEAD SURG LT BLUE

## (undated) DEVICE — BURR MIS CURVED 3.0MM

## (undated) DEVICE — KIT SPINAL PATIENT CARE JACK

## (undated) DEVICE — NDL SPINAL SPINOCAN 22GX3.5

## (undated) DEVICE — SUT 0 VICRYL / UR6 (J603)

## (undated) DEVICE — APPLICATOR CHLORAPREP ORN 26ML

## (undated) DEVICE — DRAPE TOP 53X102IN

## (undated) DEVICE — DRESSING TRANS 4X4 3/4

## (undated) DEVICE — SEE MEDLINE ITEM 156905

## (undated) DEVICE — GOWN POLY REINF BRTH SLV XL

## (undated) DEVICE — TOWEL OR NONABSORB ADH 17X26

## (undated) DEVICE — DRESSING TEGADERM 2 3/8 X 2.75

## (undated) DEVICE — ADHESIVE DERMABOND ADVANCED

## (undated) DEVICE — BLADE ELECTRO EDGE INSULATED

## (undated) DEVICE — ADHESIVE MASTISOL VIAL 48/BX

## (undated) DEVICE — MANIFOLD 4 PORT

## (undated) DEVICE — GLOVE BIOGEL PI MICRO SZ 7.5

## (undated) DEVICE — COVER PROXIMA MAYO STAND

## (undated) DEVICE — SYS CLSR WND ENDOSCP XL

## (undated) DEVICE — DRESSING AQUACEL FOAM 5 X 5